# Patient Record
Sex: MALE | Race: WHITE | NOT HISPANIC OR LATINO | Employment: OTHER | URBAN - NONMETROPOLITAN AREA
[De-identification: names, ages, dates, MRNs, and addresses within clinical notes are randomized per-mention and may not be internally consistent; named-entity substitution may affect disease eponyms.]

---

## 2017-03-26 ENCOUNTER — HISTORY (OUTPATIENT)
Dept: FAMILY MEDICINE | Facility: OTHER | Age: 57
End: 2017-03-26

## 2017-03-26 ENCOUNTER — OFFICE VISIT - GICH (OUTPATIENT)
Dept: FAMILY MEDICINE | Facility: OTHER | Age: 57
End: 2017-03-26

## 2017-03-26 DIAGNOSIS — R68.89 OTHER GENERAL SYMPTOMS AND SIGNS: ICD-10-CM

## 2017-03-26 DIAGNOSIS — R05.9 COUGH: ICD-10-CM

## 2017-03-31 ENCOUNTER — HISTORY (OUTPATIENT)
Dept: FAMILY MEDICINE | Facility: OTHER | Age: 57
End: 2017-03-31

## 2017-03-31 ENCOUNTER — OFFICE VISIT - GICH (OUTPATIENT)
Dept: FAMILY MEDICINE | Facility: OTHER | Age: 57
End: 2017-03-31

## 2017-03-31 DIAGNOSIS — J06.9 ACUTE UPPER RESPIRATORY INFECTION: ICD-10-CM

## 2017-03-31 DIAGNOSIS — J01.90 ACUTE SINUSITIS: ICD-10-CM

## 2018-01-04 NOTE — PROGRESS NOTES
Patient Information     Patient Name MRN Sex Uriel Newman [id number] Male 1960      Progress Notes by Rosalba Rascon at 3/26/2017  6:27 PM     Author:  Rosalba Rascon Service:  (none) Author Type:  PHYS- Nurse Practitioner     Filed:  3/26/2017  6:27 PM Encounter Date:  3/26/2017 Status:  Signed     :  Rosalba Rascon (PHYS- Nurse Practitioner)            Called and spoke with patient as to results, implications and any follow up required.    ROSALBA RASCON ....................  3/26/2017   6:27 PM

## 2018-01-04 NOTE — PROGRESS NOTES
Patient Information     Patient Name MRN Sex     Uriel Monsalve [id number] Male 1960      Progress Notes by Rosalba Thorne at 3/26/2017  3:30 PM     Author:  Rosalba Thorne Service:  (none) Author Type:  PHYS- Nurse Practitioner     Filed:  3/31/2017  7:20 PM Encounter Date:  3/26/2017 Status:  Signed     :  Rosalba Thorne (PHYS- Nurse Practitioner)            HPI:    Uriel Monsalve is a 56 y.o. male who presents to clinic today for a cough, body aches, fever for the past 3 days. He has felt weak and his ears are plugged. His temperature was 100.7 2 hours ago but he had taken Ibuprofen. Has a productive cough of yellow phlegm. Denies feeling short of breath. Sleeping poorly due to cough, aches and pains. Very fatigued. Taking lots of fluids, appetite is decreased. Has been taking ibuprofen 3 tabs every 4 hours or so, also Theraflu and other cold medicines.       No past medical history on file.  Past Surgical History       Procedure   Laterality Date     Removal of nail from left hand        [place] teeth extraction        Sinus surgery        Colonoscopy diagnostic   16     F/U         Social History      Substance Use Topics        Smoking status:  Former Smoker     Packs/day: 0.10     Quit date: 2017     Smokeless tobacco:  Never Used     Alcohol use  No     Current Outpatient Prescriptions       Medication  Sig Dispense Refill     multivitamin (MVI) tablet Take 1 tablet by mouth once daily.  0     [place] Oil-Omega-3 Fatty Acids (FISH OIL) 500-100 mg cap capsule Take 1 capsule by mouth once daily.       vitamin e 200 unit capsule Take 1 capsule by mouth once daily.       No current facility-administered medications for this visit.      Medications have been reviewed by me and are current to the best of my knowledge and ability.    No Known Allergies    ROS:  Refer to HPI, otherwise negative.    EXAM:  General appearance: ill appearing gentleman, in no acute distress  Head:  normocephalic, atraumatic, some facial tenderness  Ears: TM's with mild erythema, effusion bilaterally, right more erythremic, canals clear bilaterally  Eyes: conjunctivae normal  Orophayrnx: moist mucous membranes, tonsils with mild erythema, no exudates or petechiae, no post nasal drip seen  Neck: supple without adenopathy  Respiratory: clear to auscultation bilaterally, slightly diminished in lower lobes.   Cardiac: RRR with no murmurs  Dermatological: no rashes or lesions  Psychological: normal affect, alert and pleasant  Results for orders placed or performed in visit on 03/26/17      [facility] ONLY INFLUENZA A/B PCR      Result  Value Ref Range    INFLUENZA  A  PCR Negative      INFLUENZA B PCR Negative           ASSESSMENT/PLAN:    ICD-10-CM    1. Influenza-like symptoms R68.89 [facility] ONLY INFLUENZA A/B PCR      [facility] ONLY INFLUENZA A/B PCR      oseltamivir (TAMIFLU) 75 mg capsule   2. Cough R05 albuterol (PROAIR RESPICLICK) 90 mcg/actuation INHALER      codeine-guaiFENesin (CHERATUSSIN AC)  mg/5 mL liquid      DISCONTINUED: benzonatate (TESSALON PERLES) 100 mg capsule       Plan: Influenza like illness, pt agrees to Influenza PCR testing and will call with results and Rx Tamiflu is positive.   Discussed Rx for tessalon, has used in the past and said did nothing to help. Declines.  Rx for Cheratussin with codeine, albuterol 2 puffs up to 4 times a day.  Reviewed additional self care measures and instructions for following up if not improved.   See patient instructions. Pt in agreements with plan.  Discussed expected course, Signs and symptoms to return to PCP.   No further questions.       Patient Instructions   I do think you have influenza. We are waiting on the tests. I could call you with the results and if it is positive I will order Tamilfu    Robitussin with Codiene take 10 ml every 6 hours for cough.    I also ordered albuterol 2 puffs up to 4 times per day.     Take some mucinex to help loosen  the congestion.     -Please take tylenol (acetominophen) 2 tabs as needed up to 4 times daily.    -Drink lots of fluids and get plenty of rest.   -Treat symptomatically with warm salt water gargles.    -You can use throat lozenges, frequent swallows of cool/cold liquids to sooth your throat. -You may also take Advil (ibuprofen) 3 tabs up to 3 times per day or Aleve (naproxen) 2 tabs twice a day as needed for fever or pain. You may want to take this regularly for a few days if you are experiencing body aches or inflamation.   -ALWAYs take ibuprofen, naproxen with food and plenty of fluids. Stop if you develop heartburn or stomach pain.   -Oatmeal coats the throat and some patients find it soothes the pain.     -Monitor for any fevers or chills.  Return in 7-10 days if not feeling better.   -Please call clinic with any questions or concerns.   -Return to clinic with change/worsening of symptoms.

## 2018-01-04 NOTE — NURSING NOTE
Patient Information     Patient Name MRN Sex Uriel Newman [id number] Male 1960      Nursing Note by Etta Venegas at 3/26/2017  3:30 PM     Author:  Etta Venegas Service:  (none) Author Type:  (none)     Filed:  3/26/2017  4:06 PM Encounter Date:  3/26/2017 Status:  Signed     :  Etta Venegas            He has had a cough, body aches , fever for the past 3 days. He has felt weak and his ears are plugged. His temperature was 100.7 2 hours ago but he had taken Ibuprofen.      Etta Venegas LPN..................3/26/2017   4:05 PM

## 2018-01-04 NOTE — PATIENT INSTRUCTIONS
Patient Information     Patient Name MRN Sex Uriel Newman [id number] Male 1960      Patient Instructions by Rosalba Thorne at 3/26/2017  3:30 PM     Author:  Rosalba Thorne  Service:  (none) Author Type:  PHYS- Nurse Practitioner     Filed:  3/26/2017  4:48 PM  Encounter Date:  3/26/2017 Status:  Addendum     :  Rosalba Thorne (PHYS- Nurse Practitioner)        Related Notes: Original Note by Rosalba Thorne (PHYS- Nurse Practitioner) filed at 3/26/2017  4:41 PM            I do think you have influenza. We are waiting on the tests. I could call you with the results and if it is positive I will order Tamilfu    Robitussin with Codiene take 10 ml every 6 hours for cough.    I also ordered albuterol 2 puffs up to 4 times per day.     Take some mucinex to help loosen the congestion.     -Please take tylenol (acetominophen) 2 tabs as needed up to 4 times daily.    -Drink lots of fluids and get plenty of rest.   -Treat symptomatically with warm salt water gargles.    -You can use throat lozenges, frequent swallows of cool/cold liquids to sooth your throat. -You may also take Advil (ibuprofen) 3 tabs up to 3 times per day or Aleve (naproxen) 2 tabs twice a day as needed for fever or pain. You may want to take this regularly for a few days if you are experiencing body aches or inflamation.   -ALWAYs take ibuprofen, naproxen with food and plenty of fluids. Stop if you develop heartburn or stomach pain.   -Oatmeal coats the throat and some patients find it soothes the pain.     -Monitor for any fevers or chills.  Return in 7-10 days if not feeling better.   -Please call clinic with any questions or concerns.   -Return to clinic with change/worsening of symptoms.

## 2018-01-04 NOTE — NURSING NOTE
Patient Information     Patient Name MRN Sex Uriel Newman [id number] Male 1960      Nursing Note by Gosselin, Norma J at 3/31/2017 12:00 PM     Author:  Gosselin, Norma J Service:  (none) Author Type:  (none)     Filed:  3/31/2017 12:11 PM Encounter Date:  3/31/2017 Status:  Signed     :  Gosselin, Norma J            Patient presents with cough, sinus drainage and congestion, sinus pressure, poor appetite.  Norma J Gosselin LPN....................  3/31/2017   12:08 PM

## 2018-01-04 NOTE — PROGRESS NOTES
"Patient Information     Patient Name MRN Sex     Uriel Monsalve [id number] Male 1960      Progress Notes by Santa Malik NP at 3/31/2017 12:00 PM     Author:  Santa Malik NP Service:  (none) Author Type:  PHYS- Nurse Practitioner     Filed:  2017 10:35 AM Encounter Date:  3/31/2017 Status:  Signed     :  Santa Malik NP (PHYS- Nurse Practitioner)            HPI:    Uriel Monsalve is a 56 y.o. male who presents to clinic today for sinus concerns. He was seen 3/26/2017 for influenza like illness. Has had sx \"weeks\" and had gotten worse last weekend. this is clarified as sx present for 2 weeks.  Having sinus congestion, pressure. Cough and poor appetite present as well. Having SOB with cough. Cough is productive. Having LGT that are intermittent. Taking tylenol, ibuprofen for sx. Was given tessalon, cough medication and inhaler. No asthma or COPD hx.     No past medical history on file.  Past Surgical History:      Procedure  Laterality Date     COLONOSCOPY DIAGNOSTIC  16    F/U         Removal of nail from left hand       SINUS SURGERY       WISDOM TEETH EXTRACTION       Social History      Substance Use Topics        Smoking status:  Former Smoker     Packs/day: 0.10     Quit date: 2017     Smokeless tobacco:  Never Used     Alcohol use  No     Current Outpatient Prescriptions       Medication  Sig Dispense Refill     albuterol (PROAIR RESPICLICK) 90 mcg/actuation INHALER Inhale 2 Puffs by mouth every 4 hours. 1 Inhaler 0     codeine-guaiFENesin (CHERATUSSIN AC)  mg/5 mL liquid Take 10 mL by mouth every 6 hours if needed for Cough for up to 5 days. Max dose 60 mL per 24 hrs. 200 mL 0     multivitamin (MVI) tablet Take 1 tablet by mouth once daily.  0     [place] Oil-Omega-3 Fatty Acids (FISH OIL) 500-100 mg cap capsule Take 1 capsule by mouth once daily.       vitamin e 200 unit capsule Take 1 capsule by mouth once daily.       No current facility-administered medications for " this visit.      Medications have been reviewed by me and are current to the best of my knowledge and ability.    No Known Allergies    ROS:  Pertinent positives and negatives are noted in HPI.    EXAM:  General appearance: well appearing male, in no acute distress  Head: normocephalic, atraumatic, maxillary sinus tenderness  Ears: TM's with cone of light, no erythema, canals clear bilaterally  Eyes: conjunctivae normal  Orophayrnx: moist mucous membranes, tonsils without erythema, exudates or petechiae, post nasal drip seen  Nose:  Bilateral turbinate swollen and erythematous  Neck: supple without adenopathy  Respiratory: clear to auscultation bilaterally, frequent cough, no respiratory distress  Cardiac: RRR with no murmurs  Psychological: normal affect, alert and pleasant    ASSESSMENT/PLAN:    ICD-10-CM    1. Acute URI J06.9 azithromycin (ZITHROMAX) 250 mg tablet      predniSONE (DELTASONE) 20 mg tablet   2. Acute sinusitis, recurrence not specified, unspecified location J01.90 azithromycin (ZITHROMAX) 250 mg tablet      predniSONE (DELTASONE) 20 mg tablet        Very argumentative from beginning of visit. Was not wanting to talk about why was here, reports he should have had abx last week when seen. Reports needs prednisone and if not given, does not want anything as he has hx of chronic hives and needs this. Attempted to talk and explain why no abx last week and why do not feel prednisone is appropriate. He was quite rude to me despite all I tried to do. Abx and prednisone ordered. I do feel that he is appropriate for abx given sx.

## 2018-01-26 VITALS
TEMPERATURE: 99.1 F | SYSTOLIC BLOOD PRESSURE: 134 MMHG | WEIGHT: 186.8 LBS | DIASTOLIC BLOOD PRESSURE: 88 MMHG | HEART RATE: 92 BPM | OXYGEN SATURATION: 91 %

## 2018-01-26 VITALS
TEMPERATURE: 99.2 F | DIASTOLIC BLOOD PRESSURE: 76 MMHG | OXYGEN SATURATION: 93 % | HEART RATE: 64 BPM | WEIGHT: 187 LBS | HEIGHT: 70 IN | BODY MASS INDEX: 26.77 KG/M2 | SYSTOLIC BLOOD PRESSURE: 124 MMHG

## 2018-02-01 ENCOUNTER — HISTORY (OUTPATIENT)
Dept: FAMILY MEDICINE | Facility: OTHER | Age: 58
End: 2018-02-01

## 2018-02-01 ENCOUNTER — OFFICE VISIT - GICH (OUTPATIENT)
Dept: FAMILY MEDICINE | Facility: OTHER | Age: 58
End: 2018-02-01

## 2018-02-01 ENCOUNTER — DOCUMENTATION ONLY (OUTPATIENT)
Dept: FAMILY MEDICINE | Facility: OTHER | Age: 58
End: 2018-02-01

## 2018-02-01 DIAGNOSIS — J20.8 ACUTE BRONCHITIS DUE TO OTHER SPECIFIED ORGANISMS: ICD-10-CM

## 2018-02-01 PROBLEM — F52.8 PSYCHOSEXUAL DYSFUNCTION WITH INHIBITED SEXUAL EXCITEMENT: Status: ACTIVE | Noted: 2018-02-01

## 2018-02-01 RX ORDER — DIPHENOXYLATE HYDROCHLORIDE AND ATROPINE SULFATE 2.5; .025 MG/1; MG/1
1 TABLET ORAL DAILY
COMMUNITY
Start: 2016-09-24 | End: 2024-01-16

## 2018-02-01 RX ORDER — AZITHROMYCIN 250 MG/1
TABLET, FILM COATED ORAL
COMMUNITY
Start: 2017-03-31 | End: 2018-08-09

## 2018-02-01 RX ORDER — OMEGA-3/DHA/EPA/FISH OIL 60 MG-90MG
1 CAPSULE ORAL DAILY
COMMUNITY
End: 2021-03-01

## 2018-02-09 VITALS
OXYGEN SATURATION: 94 % | RESPIRATION RATE: 20 BRPM | BODY MASS INDEX: 27.8 KG/M2 | TEMPERATURE: 98.4 F | WEIGHT: 194.2 LBS | DIASTOLIC BLOOD PRESSURE: 76 MMHG | SYSTOLIC BLOOD PRESSURE: 126 MMHG | HEART RATE: 80 BPM | HEIGHT: 70 IN

## 2018-02-13 NOTE — PROGRESS NOTES
Patient Information     Patient Name MRN Sex     Uriel Monsalve [id number] Male 1960      Progress Notes by Annalee Palma NP at 2018 12:15 PM     Author:  Annalee Palma NP Service:  (none) Author Type:  PHYS- Nurse Practitioner     Filed:  2018  1:06 PM Encounter Date:  2018 Status:  Signed     :  Annalee Palma NP (PHYS- Nurse Practitioner)            Nursing Notes:   Mary García  2018 12:47 PM  Signed  Patient presents to the clinic for cough. States he has had it for about a total of about a week, but recently has gotten worse. States his coughing fits are so bad he feels SOB. Has been afebrile.   Mary García LPN............................ 2018 12:34 PM    SUBJECTIVE:    Uriel Monsalve is a 57 y.o. male who presents for Cough for a week.     URI    This is a new problem. The current episode started in the past 7 days. The problem has been gradually worsening. There has been no fever. Associated symptoms include congestion, coughing, rhinorrhea and a sore throat. Pertinent negatives include no ear pain, nausea, neck pain, plugged ear sensation, rash, sinus pain, sneezing, swollen glands, vomiting or wheezing. Associated symptoms comments: He did not get a flu shot this year. He is eating and drinking well. Increased sleepiness. No hx of asthma, But has COPD. . He has tried acetaminophen (Apple cider vinegar ) for the symptoms. The treatment provided mild relief.       Current Outpatient Prescriptions on File Prior to Visit       Medication  Sig Dispense Refill     albuterol (PROAIR RESPICLICK) 90 mcg/actuation INHALER Inhale 2 Puffs by mouth every 4 hours. 1 Inhaler 0     multivitamin (MVI) tablet Take 1 tablet by mouth once daily.  0     [place] Oil-Omega-3 Fatty Acids (FISH OIL) 500-100 mg cap capsule Take 1 capsule by mouth once daily.       vitamin e 200 unit capsule Take 1 capsule by mouth once daily.       No current facility-administered  "medications on file prior to visit.        REVIEW OF SYSTEMS:  Review of Systems   HENT: Positive for congestion, rhinorrhea and sore throat. Negative for ear pain, sinus pain and sneezing.    Respiratory: Positive for cough. Negative for wheezing.    Gastrointestinal: Negative for nausea and vomiting.   Musculoskeletal: Negative for neck pain.   Skin: Negative for rash.       OBJECTIVE:  /76 (Cuff Site: Right Arm, Position: Sitting, Cuff Size: Adult Regular)  Pulse 80  Temp 98.4  F (36.9  C) (Tympanic)  Resp 20  Ht 1.778 m (5' 10\")  Wt 88.1 kg (194 lb 3.2 oz)  SpO2 94%  BMI 27.86 kg/m2    EXAM:   Physical Exam   Constitutional: He is well-developed, well-nourished, and in no distress.   HENT:   Head: Normocephalic and atraumatic.   Right Ear: Tympanic membrane and ear canal normal.   Left Ear: Tympanic membrane and ear canal normal.   Nose: Right sinus exhibits no maxillary sinus tenderness and no frontal sinus tenderness. Left sinus exhibits no maxillary sinus tenderness and no frontal sinus tenderness.   Mouth/Throat: Uvula is midline, oropharynx is clear and moist and mucous membranes are normal.   Nasal congestion noted.    Eyes: Conjunctivae are normal.   Neck: Neck supple.   Cardiovascular: Normal rate, regular rhythm and normal heart sounds.    Pulmonary/Chest: Effort normal. No respiratory distress. He has no decreased breath sounds. He has wheezes in the right upper field and the left upper field. He has no rhonchi. He has no rales.   Expiratory wheezing noted   Lymphadenopathy:     He has no cervical adenopathy.   Skin: Skin is warm and dry.   Psychiatric: Mood and affect normal.   Nursing note and vitals reviewed.      ASSESSMENT/PLAN:    ICD-10-CM    1. Acute viral bronchitis J20.8 albuterol HFA (PROAIR HFA) 90 mcg/actuation inhaler      predniSONE (DELTASONE) 20 mg tablet      codeine-guaiFENesin (ROBITUSSIN AC)  mg/5 mL liquid      Likely Viral bronchitis d/t RSV which is going " around the community.     Plan:  Home cares and OTC Gone over. F/U prn. Symptoms likely due to virus. No antibiotic is needed at this time. Symptoms typically worse on days 2-5 and then stabilize and you are sick for days 5-12. Days 12-14 there is slow resolution and if there is a cough, studies show it can linger longer, however one is not as ill as in the beginning. If symptoms begin worsening or fail to improve after 14 days, return to clinic for reevaluation. All questions were answered and he is in agreement with plan.       MICHAEL PARKS NP ....................  2/1/2018   12:44 PM

## 2018-02-13 NOTE — PATIENT INSTRUCTIONS
Patient Information     Patient Name MRN Uriel Kasper [id number] Male 1960      Patient Instructions by Annalee Palma NP at 2018 12:15 PM     Author:  Annalee Palma NP Service:  (none) Author Type:  PHYS- Nurse Practitioner     Filed:  2018 12:55 PM Encounter Date:  2018 Status:  Signed     :  Annalee Palma NP (PHYS- Nurse Practitioner)            Acute Bronchitis   ________________________________________________________________________  KEY POINTS    Acute bronchitis is swelling and irritation of the airways (bronchial tubes) which connect the windpipe to the lungs. Acute bronchitis may also be called a chest cold.    Acute bronchitis often does not need medical treatment. You may be able to treat your symptoms at home. Talk to your healthcare provider if your symptoms are severe or if you have another medical condition such as heart or lung disease or diabetes.    Drink plenty of liquids and rest at home. Ask your healthcare provider what symptoms or problems you should watch for and what to do if you have them.  ________________________________________________________________________  What is acute bronchitis?  Acute bronchitis is swelling and irritation of the airways (bronchial tubes) which connect the windpipe to the lungs. Acute bronchitis usually goes away within a few weeks with treatment. Acute bronchitis may also be called a chest cold.  A different form of bronchitis, called chronic bronchitis, is a long-term condition that causes breathing problems. Chronic bronchitis is one type of chronic obstructive pulmonary disease (COPD) and is usually caused by smoking.  What is the cause?  Acute bronchitis is usually caused by viral infections that affect the lungs. Less often, it may be a bacterial infection.  Acute bronchitis is most common during the winter or when the level of air pollution is high.  People who have a higher risk for bronchitis  include:    Infants, young children, and older adults    Smokers    People with heart disease    People with allergies, asthma, or other lung diseases  What are the symptoms?  Symptoms may include:    A deep cough with yellowish or greenish mucus    Pain in your chest when you breathe deeply or cough    Wheezing or feeling short of breath    Fever and chills    Headache    Body aches  How is it diagnosed?  Your healthcare provider will ask about your symptoms and medical history and examine you. You may have tests, such as:    Chest X-ray    Blood tests  How is it treated?  Acute bronchitis often does not need medical treatment. You may be able to treat your symptoms at home:    Get plenty of rest.    Drink plenty of clear liquids unless your healthcare provider has told you to limit liquids. Water, broth, juice, electrolyte solutions, and non-caffeinated drinks are best. If you have a fever, your body needs more liquid because you can get dehydrated.    Talk to your healthcare provider if your symptoms are severe or if you have heart disease, asthma, chronic bronchitis, kidney disease, diabetes, or another chronic medical problem. You may need to take antibiotic medicines. If you are wheezing, you may need an inhaler medicine to make it easier to breathe.  Most of the time acute bronchitis clears up in several days. Your cough may slowly get better in 1 to 4 weeks. It may take you longer to recover if:    You are a smoker.    You live in an area where air pollution is a problem.    You have a heart or lung disease, including asthma.    You have other health problems.  How can I take care of myself?  Follow the full course of treatment prescribed by your healthcare provider. In addition:    Use a humidifier to put more moisture in the air. Avoid steam vaporizers because they can cause burns. Be sure to keep the humidifier clean, as recommended in the 's instructions. It's important to keep bacteria and  mold from growing in the water container.    Drink plenty of liquids. Ask your healthcare provider about how much liquid you should drink each day.    Take cough medicine if recommended by your healthcare provider. Cover your cough.    Don t smoke, and stay away from others who are smoking.    Avoid breathing dust and chemical fumes.    Get extra rest.    Take nonprescription medicine, such as acetaminophen, ibuprofen, or naproxen to treat pain and fever. Read the label and take as directed. Unless recommended by your healthcare provider, you should not take these medicines for more than 10 days.    Nonsteroidal anti-inflammatory medicines (NSAIDs), such as ibuprofen, naproxen, and aspirin, may cause stomach bleeding and other problems. These risks increase with age.    Acetaminophen may cause liver damage or other problems. Unless recommended by your provider, don't take more than 3000 milligrams (mg) in 24 hours. To make sure you don t take too much, check other medicines you take to see if they also contain acetaminophen. Ask your provider if you need to avoid drinking alcohol while taking this medicine.  Ask your provider:    How and when you will get your test results    How long it will take to recover    If there are activities you should avoid and when you can return to your normal activities    How to take care of yourself at home    What symptoms or problems you should watch for and what to do if you have them  Make sure you know when you should come back for a checkup. Keep all appointments for provider visits or tests.  How can I help prevent acute bronchitis?  To reduce your risk of getting a lung infection:    Wash your hands often and especially after using the restroom, coughing, sneezing, or blowing your nose. Also wash your hands before eating or touching your eyes.    Stay at least 6 feet away from people who are sick, if you can.    Stay indoors as much as possible on high-pollution days.    Take  care of your health. Try to get at least 7 to 9 hours of sleep each night. Eat a healthy diet and try to keep a healthy weight. If you smoke, try to quit. If you want to drink alcohol, ask your healthcare provider how much is safe for you to drink. Learn ways to manage stress. Stay physically active as advised by your provider.

## 2018-02-13 NOTE — NURSING NOTE
Patient Information     Patient Name MRN Sex Uriel Newman [id number] Male 1960      Nursing Note by Mary García at 2018 12:15 PM     Author:  Mary García Service:  (none) Author Type:  NURS- Student Practical Nurse     Filed:  2018 12:47 PM Encounter Date:  2018 Status:  Signed     :  Mary García (NURS- Student Practical Nurse)            Patient presents to the clinic for cough. States he has had it for about a total of about a week, but recently has gotten worse. States his coughing fits are so bad he feels SOB. Has been afebrile.   Mary García LPN............................ 2018 12:34 PM

## 2018-03-03 ENCOUNTER — OFFICE VISIT (OUTPATIENT)
Dept: FAMILY MEDICINE | Facility: OTHER | Age: 58
End: 2018-03-03
Attending: FAMILY MEDICINE
Payer: COMMERCIAL

## 2018-03-03 VITALS
HEIGHT: 70 IN | RESPIRATION RATE: 20 BRPM | SYSTOLIC BLOOD PRESSURE: 130 MMHG | HEART RATE: 94 BPM | OXYGEN SATURATION: 90 % | TEMPERATURE: 96.9 F | WEIGHT: 189.3 LBS | BODY MASS INDEX: 27.1 KG/M2 | DIASTOLIC BLOOD PRESSURE: 84 MMHG

## 2018-03-03 DIAGNOSIS — J40 BRONCHITIS: Primary | ICD-10-CM

## 2018-03-03 PROCEDURE — 99213 OFFICE O/P EST LOW 20 MIN: CPT | Performed by: FAMILY MEDICINE

## 2018-03-03 RX ORDER — AZITHROMYCIN 250 MG/1
TABLET, FILM COATED ORAL
Qty: 6 TABLET | Refills: 0 | Status: SHIPPED | OUTPATIENT
Start: 2018-03-03 | End: 2018-08-09

## 2018-03-03 ASSESSMENT — ENCOUNTER SYMPTOMS
FEVER: 1
COUGH: 1
FATIGUE: 1
CHILLS: 0

## 2018-03-03 NOTE — PROGRESS NOTES
"  SUBJECTIVE:   Uriel Monsalve is a 57 year old male who presents to clinic today for the following health issues: cough     HPI Comments: Treated one month ago with prednisone inhaler and cough medication without any relief     Cough   Associated symptoms include ear pain. Pertinent negatives include no chills.   Sinus Problem    Associated symptoms include ear pain and cough. Pertinent negatives include no chills.   Ear Problem   Associated symptoms include coughing, fatigue and a fever. Pertinent negatives include no chills.   URI   This is a new problem. The current episode started more than 1 month ago. The problem occurs constantly. The problem has been gradually worsening. Associated symptoms include coughing, fatigue and a fever. Pertinent negatives include no chills. Associated symptoms comments: Nasal congestion. He has tried acetaminophen, NSAIDs, drinking and rest for the symptoms. The treatment provided significant relief.         Patient Active Problem List    Diagnosis Date Noted     Bronchitis 03/03/2018     Priority: Medium     Psychosexual dysfunction with inhibited sexual excitement 02/01/2018     Priority: Medium     Diverticulosis of sigmoid colon 01/25/2016     Priority: Medium     H/O adenomatous polyp of colon 01/25/2016     Priority: Medium     Health care maintenance 01/21/2016     Priority: Medium     Hypercholesterolemia 01/12/2016     Priority: Medium     Hives 01/11/2016     Priority: Medium     No past medical history on file.     Review of Systems   Constitutional: Positive for fatigue and fever. Negative for chills.   HENT: Positive for ear pain.    Respiratory: Positive for cough.         OBJECTIVE:     /84 (BP Location: Left arm, Patient Position: Sitting, Cuff Size: Adult Regular)  Pulse 94  Temp 96.9  F (36.1  C) (Tympanic)  Resp 20  Ht 5' 10\" (1.778 m)  Wt 189 lb 4.8 oz (85.9 kg)  SpO2 90%  BMI 27.16 kg/m2  Body mass index is 27.16 kg/(m^2).  Physical Exam "   Constitutional:   sick   HENT:   Right Ear: External ear normal.   Left Ear: External ear normal.   Cardiovascular: Normal rate and regular rhythm.    Pulmonary/Chest: Effort normal. He has wheezes. He has rales.       none     ASSESSMENT/PLAN:         1. Bronchitis  Fu in one week at the clinic if not improving   - azithromycin (ZITHROMAX) 250 MG tablet; Two tablets first day, then one tablet daily for four days.  Dispense: 6 tablet; Refill: 0  - Albuterol Sulfate 108 (90 BASE) MCG/ACT AEPB; Inhale 2 puffs into the lungs every 4 hours  Dispense: 1 each; Refill: 3      Fernando Macedo MD  [facility]

## 2018-03-03 NOTE — NURSING NOTE
Patient presents to clinic with complaint of ongoing productive cough , sinus pressure, and ear pain. Patient was seen in clinic 2/1/18 with similar symptoms and he says that his symptoms have not improved since then. At that time, he was prescribed prednisone and albuterol inhaler. He states that the inhaler has helped a lot.  Eliezer Cristobal LPN...... 10:37 AM 3/3/2018

## 2018-03-03 NOTE — MR AVS SNAPSHOT
"              After Visit Summary   3/3/2018    Uriel Monsalve    MRN: [id number]           Patient Information     Date Of Birth          1960        Visit Information        Provider Department      3/3/2018 10:15 AM Fernando Macedo MD [facility]        Today's Diagnoses     Bronchitis    -  1       Follow-ups after your visit        Who to contact     If you have questions or need follow up information about today's clinic visit or your schedule please contact [facility] directly at [phone].  Normal or non-critical lab and imaging results will be communicated to you by Apollidonhart, letter or phone within 4 business days after the clinic has received the results. If you do not hear from us within 7 days, please contact the clinic through Apollidonhart or phone. If you have a critical or abnormal lab result, we will notify you by phone as soon as possible.  Submit refill requests through [employer] or call your pharmacy and they will forward the refill request to us. Please allow 3 business days for your refill to be completed.          Additional Information About Your Visit        MyChart Information     [employer] lets you send messages to your doctor, view your test results, renew your prescriptions, schedule appointments and more. To sign up, go to [email or web address] . Click on \"Log in\" on the left side of the screen, which will take you to the Welcome page. Then click on \"Sign up Now\" on the right side of the page.     You will be asked to enter the access code listed below, as well as some personal information. Please follow the directions to create your username and password.     Your access code is: [email or web address]  Expires: 2018 10:57 AM     Your access code will  in 90 days. If you need help or a new code, please call your [facility] or [phone].        Care EveryWhere ID     This is your Care EveryWhere ID. This could be used by other " "organizations to access your [place] medical records  [id number]        Your Vitals Were     Pulse Temperature Respirations Height Pulse Oximetry BMI (Body Mass Index)    94 96.9  F (36.1  C) (Tympanic) 20 5' 10\" (1.778 m) 90% 27.16 kg/m2       Blood Pressure from Last 3 Encounters:   03/03/18 130/84   02/01/18 126/76   03/31/17 124/76    Weight from Last 3 Encounters:   03/03/18 189 lb 4.8 oz (85.9 kg)   02/01/18 194 lb 3.2 oz (88.1 kg)   03/31/17 187 lb (84.8 kg)              Today, you had the following     No orders found for display         Today's Medication Changes          These changes are accurate as of 3/3/18 10:57 AM.  If you have any questions, ask your nurse or doctor.               These medicines have changed or have updated prescriptions.        Dose/Directions    * azithromycin 250 MG tablet   Commonly known as:  ZITHROMAX   This may have changed:  Another medication with the same name was added. Make sure you understand how and when to take each.   Changed by:  Fernando Macedo MD        Take 500 mg (2 tabs) by mouth on day 1, then 250 mg (1 tab) daily for days 2-5.   Refills:  0       * azithromycin 250 MG tablet   Commonly known as:  ZITHROMAX   This may have changed:  You were already taking a medication with the same name, and this prescription was added. Make sure you understand how and when to take each.   Used for:  Bronchitis   Changed by:  Fernando Macedo MD        Two tablets first day, then one tablet daily for four days.   Quantity:  6 tablet   Refills:  0       * Notice:  This list has 2 medication(s) that are the same as other medications prescribed for you. Read the directions carefully, and ask your doctor or other care provider to review them with you.         Where to get your medicines      These medications were sent to [facility] [address] [place] [address]     Phone:  [phone]     Albuterol " Sulfate 108 (90 BASE) MCG/ACT Aepb    azithromycin 250 MG tablet                Primary Care Provider Fax #    Physician No Ref-Primary [phone]       No address on file        Equal Access to Services     JOHN HAHN : Gilberto rafiq otoole perla Ware, mary yoanaluis, emily fuentes, gloria holtroge brandon. So [facility] [phone].    ATENCIÓN: Si habla español, tiene a munguia disposición servicios gratuitos de asistencia lingüística. Llame al [phone].    We comply with applicable federal civil rights laws and [place] laws. We do not discriminate on the basis of race, color, national origin, age, disability, sex, sexual orientation, or gender identity.            Thank you!     Thank you for choosing [facility] [place] [facility] AND [facility]  for your care. Our goal is always to provide you with excellent care. Hearing back from our patients is one way we can continue to improve our services. Please take a few minutes to complete the written survey that you may receive in the mail after your visit with us. Thank you!             Your Updated Medication List - Protect others around you: Learn how to safely use, store and throw away your medicines at www.disposemymeds.org.          This list is accurate as of 3/3/18 10:57 AM.  Always use your most recent med list.                   Brand Name Dispense Instructions for use Diagnosis    Albuterol Sulfate 108 (90 BASE) MCG/ACT Aepb     1 each    Inhale 2 puffs into the lungs every 4 hours    Bronchitis       * azithromycin 250 MG tablet    ZITHROMAX     Take 500 mg (2 tabs) by mouth on day 1, then 250 mg (1 tab) daily for days 2-5.        * azithromycin 250 MG tablet    ZITHROMAX    6 tablet    Two tablets first day, then one tablet daily for four days.    Bronchitis       Fish Oil 500 MG Caps      Take 1 capsule by mouth daily        MULTI-VITAMINS Tabs      Take 1 tablet by mouth daily        vitamin E 200 UNIT capsule      Take 1 capsule by  mouth daily        * Notice:  This list has 2 medication(s) that are the same as other medications prescribed for you. Read the directions carefully, and ask your doctor or other care provider to review them with you.

## 2018-08-09 ENCOUNTER — OFFICE VISIT (OUTPATIENT)
Dept: SURGERY | Facility: OTHER | Age: 58
End: 2018-08-09
Attending: SURGERY
Payer: COMMERCIAL

## 2018-08-09 VITALS — DIASTOLIC BLOOD PRESSURE: 66 MMHG | SYSTOLIC BLOOD PRESSURE: 114 MMHG | WEIGHT: 189.6 LBS | BODY MASS INDEX: 27.2 KG/M2

## 2018-08-09 DIAGNOSIS — L72.3 INFECTED SEBACEOUS CYST OF SKIN: Primary | ICD-10-CM

## 2018-08-09 DIAGNOSIS — L08.9 INFECTED SEBACEOUS CYST OF SKIN: Primary | ICD-10-CM

## 2018-08-09 PROCEDURE — 10060 I&D ABSCESS SIMPLE/SINGLE: CPT | Performed by: SURGERY

## 2018-08-09 RX ORDER — SACCHAROMYCES BOULARDII 250 MG
250 CAPSULE ORAL 2 TIMES DAILY
COMMUNITY
End: 2021-09-08

## 2018-08-09 RX ORDER — CALCIUM POLYCARBOPHIL 625 MG 625 MG/1
2 TABLET ORAL DAILY
COMMUNITY
End: 2023-06-29

## 2018-08-09 ASSESSMENT — PAIN SCALES - GENERAL: PAINLEVEL: NO PAIN (0)

## 2018-08-09 NOTE — PROGRESS NOTES
Patient presents with large, tender lump on his back. Was sore for a couple of weeks and then started having some drainage. No fever. Hasn't had cysts like this before.   /66 (BP Location: Right arm, Patient Position: Sitting, Cuff Size: Adult Large)  Wt 189 lb 9.6 oz (86 kg)  BMI 27.2 kg/m2  General: no acute distress  Skin mid back with 3 x 2.5 cm area of induration and erythema. Some skin breakdown with purulent drainage noted.  A: infected sebaceous cyst  Plan:   Discussed risks and benefits of incision and drainage infection/fluid. Specifically, I explained the risks of continued infection, bleeding, bruising and recurrence. The patient expressed understanding and wishes to proceed. Informed consent paperwork was completed.     Procedure:  The patient was placed in a prone position. The mid back was prepped with betadine and draped. Local anesthetic, lidocaine with epinephrine, was infiltrated in the skin and subcutaneous tissue in the area of the planned incision. The area of fluctuance was palpated. Skin incision was made in a cruciate fashion. There was drainage of purulent fluid. The abscess cavity was explored and no undrained fluid was noted. Sterile gauze was placed in the cavity. Sterile dressing was applied. The patient tolerated the procedure with no immediately apparent complications.    Patient will follow up in 4-6 week for recheck and possible excision. Patient will call withconcerns or questions prior to follow up.

## 2018-08-09 NOTE — MR AVS SNAPSHOT
After Visit Summary   8/9/2018    Uriel Monsalve    MRN: [id number]           Patient Information     Date Of Birth          1960        Visit Information        Provider Department      8/9/2018 1:00 PM Lissy Story MD [facility]        Care Instructions    Remove bandage tomorrow.  You may shower in the morning.  Follow up in 6 weeks.            Follow-ups after your visit        Who to contact     If you have questions or need follow up information about today's clinic visit or your schedule please contact [facility] directly at [phone].  Normal or non-critical lab and imaging results will be communicated to you by MyChart, letter or phone within 4 business days after the clinic has received the results. If you do not hear from us within 7 days, please contact the clinic through Anametrixhart or phone. If you have a critical or abnormal lab result, we will notify you by phone as soon as possible.  Submit refill requests through [employer] or call your pharmacy and they will forward the refill request to us. Please allow 3 business days for your refill to be completed.          Additional Information About Your Visit        Care EveryWhere ID     This is your Care EveryWhere ID. This could be used by other organizations to access your [place] medical records  [id number]        Your Vitals Were     BMI (Body Mass Index)                   27.2 kg/m2            Blood Pressure from Last 3 Encounters:   08/09/18 114/66   03/03/18 130/84   02/01/18 126/76    Weight from Last 3 Encounters:   08/09/18 189 lb 9.6 oz (86 kg)   03/03/18 189 lb 4.8 oz (85.9 kg)   02/01/18 194 lb 3.2 oz (88.1 kg)              Today, you had the following     No orders found for display         Today's Medication Changes          These changes are accurate as of 8/9/18  1:22 PM.  If you have any questions, ask your nurse or doctor.               Stop taking these medicines if you  haven't already. Please contact your care team if you have questions.     azithromycin 250 MG tablet   Commonly known as:  ZITHROMAX   Stopped by:  Lissy Story MD                    Primary Care Provider Fax #    Physician No Ref-Primary [phone]       No address on file        Equal Access to Services     JOHN GIRALDOKRISTIE : Hadcassidy rafiq otoole evelino Sotarshaali, waaxda luqadaha, qaybta kaalmada adesherwinyada, gloria freddiein hayaaroge moonsherwin dick latommyroge . So [facility] [phone].    ATENCIÓN: Si habla español, tiene a munguia disposición servicios gratuitos de asistencia lingüística. Llame al [phone].    We comply with applicable federal civil rights laws and [place] laws. We do not discriminate on the basis of race, color, national origin, age, disability, sex, sexual orientation, or gender identity.            Thank you!     Thank you for choosing [facility] AND [facility]  for your care. Our goal is always to provide you with excellent care. Hearing back from our patients is one way we can continue to improve our services. Please take a few minutes to complete the written survey that you may receive in the mail after your visit with us. Thank you!             Your Updated Medication List - Protect others around you: Learn how to safely use, store and throw away your medicines at www.disposemymeds.org.          This list is accurate as of 8/9/18  1:22 PM.  Always use your most recent med list.                   Brand Name Dispense Instructions for use Diagnosis    albuterol 108 (90 Base) MCG/ACT Aepb inhaler    PROAIR RESPICLICK    1 each    Inhale 2 puffs into the lungs every 4 hours    Bronchitis       calcium polycarbophil 625 MG tablet    FIBERCON     Take 2 tablets by mouth daily        Fish Oil 500 MG Caps      Take 1 capsule by mouth daily        MULTI-VITAMINS Tabs      Take 1 tablet by mouth daily        saccharomyces boulardii 250 MG capsule    FLORASTOR     Take 250 mg by mouth 2 times daily        VITAMIN C PO       Take 500 mg by mouth daily        vitamin E 200 UNIT capsule      Take 1 capsule by mouth daily

## 2018-08-09 NOTE — NURSING NOTE
"Patient presents to clinic with a cyst on his back.    Maura Millan LPN  8/9/2018  1:00 PM    Consent form signed.    TIMEOUT  Universal Protocol    A. Pre-procedure verification complete : Yes  1-relevant information / documentation available, reviewed and properly matched to the patient; 2-consent accurate and complete, 3-equipment and supplies available    B. Site marking complete : NA  Site marked if not in continuous attendance with patient    C. TIME OUT completed : yes  Time Out was conducted just prior to starting procedure to verify the eight required elements: 1-patient identity, 2-consent accurate and complete, 3-position, 4-correct side/site marked (if applicable), 5-procedure, 6-relevant images / results properly labeled and displayed (if applicable), 7-antibiotics / irrigation fluids (if applicable), 8-safety precautions.    Chief Complaint   Patient presents with     Lesion Removal     cyst on back       Initial /66 (BP Location: Right arm, Patient Position: Sitting, Cuff Size: Adult Large)  Wt 189 lb 9.6 oz (86 kg)  BMI 27.2 kg/m2 Estimated body mass index is 27.2 kg/(m^2) as calculated from the following:    Height as of 3/3/18: 5' 10\" (1.778 m).    Weight as of this encounter: 189 lb 9.6 oz (86 kg).  Medication Reconciliation: complete    Maura Millan LPN    "

## 2018-11-06 ENCOUNTER — ALLIED HEALTH/NURSE VISIT (OUTPATIENT)
Dept: FAMILY MEDICINE | Facility: OTHER | Age: 58
End: 2018-11-06
Payer: COMMERCIAL

## 2018-11-06 DIAGNOSIS — Z23 NEED FOR PROPHYLACTIC VACCINATION AND INOCULATION AGAINST INFLUENZA: Primary | ICD-10-CM

## 2018-11-06 PROCEDURE — 90471 IMMUNIZATION ADMIN: CPT | Performed by: FAMILY MEDICINE

## 2018-11-06 PROCEDURE — 90686 IIV4 VACC NO PRSV 0.5 ML IM: CPT | Performed by: FAMILY MEDICINE

## 2018-11-06 NOTE — PROGRESS NOTES

## 2018-11-06 NOTE — MR AVS SNAPSHOT
After Visit Summary   11/6/2018    Uriel Monsalve    MRN: [id number]           Patient Information     Date Of Birth          1960        Visit Information        Provider Department      11/6/2018 1:45 PM  INJECTION NURSE [facility]        Today's Diagnoses     Need for prophylactic vaccination and inoculation against influenza    -  1       Follow-ups after your visit        Who to contact     If you have questions or need follow up information about today's clinic visit or your schedule please contact [facility] directly at [phone].  Normal or non-critical lab and imaging results will be communicated to you by MyChart, letter or phone within 4 business days after the clinic has received the results. If you do not hear from us within 7 days, please contact the clinic through MyChart or phone. If you have a critical or abnormal lab result, we will notify you by phone as soon as possible.  Submit refill requests through [employer] or call your pharmacy and they will forward the refill request to us. Please allow 3 business days for your refill to be completed.          Additional Information About Your Visit        Care EveryWhere ID     This is your Care EveryWhere ID. This could be used by other organizations to access your [place] medical records  [id number]         Blood Pressure from Last 3 Encounters:   08/09/18 114/66   03/03/18 130/84   02/01/18 126/76    Weight from Last 3 Encounters:   08/09/18 189 lb 9.6 oz (86 kg)   03/03/18 189 lb 4.8 oz (85.9 kg)   02/01/18 194 lb 3.2 oz (88.1 kg)              We Performed the Following     HC FLU VAC PRESRV FREE QUAD SPLIT VIR 3+YRS IM     Vaccine Administration, Initial [[id number]]        Primary Care Provider Fax #    Physician No Ref-Primary [phone]       No address on file        Equal Access to Services     JOHN HAHN AH: mary Benavides qaybta kaalmada  gloria fuentessherwin nyajerrod rodríguezaan ah. So [facility] [phone].    ATENCIÓN: Si habla ki, tiene a munguia disposición servicios gratuitos de asistencia lingüística. Sandra al [phone].    We comply with applicable federal civil rights laws and [place] laws. We do not discriminate on the basis of race, color, national origin, age, disability, sex, sexual orientation, or gender identity.            Thank you!     Thank you for choosing [facility] AND [facility]  for your care. Our goal is always to provide you with excellent care. Hearing back from our patients is one way we can continue to improve our services. Please take a few minutes to complete the written survey that you may receive in the mail after your visit with us. Thank you!             Your Updated Medication List - Protect others around you: Learn how to safely use, store and throw away your medicines at www.disposemymeds.org.          This list is accurate as of 11/6/18  2:18 PM.  Always use your most recent med list.                   Brand Name Dispense Instructions for use Diagnosis    albuterol 108 (90 Base) MCG/ACT Aepb inhaler    PROAIR RESPICLICK    1 each    Inhale 2 puffs into the lungs every 4 hours    Bronchitis       calcium polycarbophil 625 MG tablet    FIBERCON     Take 2 tablets by mouth daily        Fish Oil 500 MG Caps      Take 1 capsule by mouth daily        MULTI-VITAMINS Tabs      Take 1 tablet by mouth daily        saccharomyces boulardii 250 MG capsule    FLORASTOR     Take 250 mg by mouth 2 times daily        VITAMIN C PO      Take 500 mg by mouth daily        vitamin E 200 UNIT capsule      Take 1 capsule by mouth daily

## 2018-11-12 ENCOUNTER — OFFICE VISIT (OUTPATIENT)
Dept: FAMILY MEDICINE | Facility: OTHER | Age: 58
End: 2018-11-12
Attending: FAMILY MEDICINE
Payer: COMMERCIAL

## 2018-11-12 ENCOUNTER — HOSPITAL ENCOUNTER (OUTPATIENT)
Dept: GENERAL RADIOLOGY | Facility: OTHER | Age: 58
Discharge: HOME | End: 2018-11-12
Attending: FAMILY MEDICINE | Admitting: FAMILY MEDICINE
Payer: COMMERCIAL

## 2018-11-12 VITALS
HEART RATE: 90 BPM | HEIGHT: 70 IN | BODY MASS INDEX: 28.06 KG/M2 | WEIGHT: 196 LBS | DIASTOLIC BLOOD PRESSURE: 78 MMHG | SYSTOLIC BLOOD PRESSURE: 106 MMHG | TEMPERATURE: 97.5 F | OXYGEN SATURATION: 94 %

## 2018-11-12 DIAGNOSIS — J40 BRONCHITIS: ICD-10-CM

## 2018-11-12 DIAGNOSIS — J44.1 COPD EXACERBATION (H): ICD-10-CM

## 2018-11-12 DIAGNOSIS — J40 BRONCHITIS: Primary | ICD-10-CM

## 2018-11-12 PROCEDURE — 99213 OFFICE O/P EST LOW 20 MIN: CPT | Performed by: FAMILY MEDICINE

## 2018-11-12 PROCEDURE — 71046 X-RAY EXAM CHEST 2 VIEWS: CPT

## 2018-11-12 RX ORDER — AZITHROMYCIN 250 MG/1
TABLET, FILM COATED ORAL
Qty: 6 TABLET | Refills: 0 | Status: SHIPPED | OUTPATIENT
Start: 2018-11-12 | End: 2019-02-07

## 2018-11-12 RX ORDER — PREDNISONE 20 MG/1
20 TABLET ORAL 2 TIMES DAILY
Qty: 10 TABLET | Refills: 0 | Status: SHIPPED | OUTPATIENT
Start: 2018-11-12 | End: 2019-02-07

## 2018-11-12 RX ORDER — ALBUTEROL SULFATE 90 UG/1
2 AEROSOL, METERED RESPIRATORY (INHALATION) EVERY 6 HOURS PRN
Qty: 1 INHALER | Refills: 1 | Status: SHIPPED | OUTPATIENT
Start: 2018-11-12 | End: 2019-02-07

## 2018-11-12 ASSESSMENT — ENCOUNTER SYMPTOMS
APPETITE CHANGE: 0
NAUSEA: 0
PSYCHIATRIC NEGATIVE: 1
GASTROINTESTINAL NEGATIVE: 1

## 2018-11-12 ASSESSMENT — PAIN SCALES - GENERAL: PAINLEVEL: NO PAIN (0)

## 2018-11-12 NOTE — NURSING NOTE
Patient presents to the clinic today for a cough x4 weeks.    Judith Magaña LPN.................. 11/12/2018 8:13 AM

## 2018-11-12 NOTE — PROGRESS NOTES
Nursing Notes:   Judith Magaña LPN  11/12/2018  8:27 AM  Signed  Patient presents to the clinic today for a cough x4 weeks.    Judith Magaña LPN.................. 11/12/2018 8:13 AM       SUBJECTIVE:   CC:  Uriel Monsalve is a 57 year old male who presents to clinic today for the following health issues:  cough    HPI  Uriel Monsalve is a 57 year old male in for evaluation of cough.  Onset a month ago.  Cough is productive at times.  No fever.  Some ear pains with it.  Has wheezing now too.  He is noticing a bit more shortness of breath with exertion.  He is also notes that after activities, such as blowing snow yesterday, he coughs and wheezes more.  He thought this was related to allergies or the furnace.  He has been using an albuterol in this past month - it helps some.  Had similar symptoms last winter.  No chest pain.  No appetite change.    He got a flu shot last week.  Former smoker, having quit 2 years ago.     No Known Allergies  Current Outpatient Prescriptions   Medication     albuterol (PROAIR HFA/PROVENTIL HFA/VENTOLIN HFA) 108 (90 Base) MCG/ACT inhaler     azithromycin (ZITHROMAX) 250 MG tablet     predniSONE (DELTASONE) 20 MG tablet     Albuterol Sulfate 108 (90 BASE) MCG/ACT AEPB     Ascorbic Acid (VITAMIN C PO)     calcium polycarbophil (FIBERCON) 625 MG tablet     Multiple Vitamin (MULTI-VITAMINS) TABS     Omega-3 Fatty Acids (FISH OIL) 500 MG CAPS     saccharomyces boulardii (FLORASTOR) 250 MG capsule     vitamin E 200 UNIT capsule     No current facility-administered medications for this visit.       No past medical history on file.   Past Surgical History:   Procedure Laterality Date     COLONOSCOPY      1/25/16,F/U 2021     ENDOSCOPIC SINUS SURGERY      No Comments Provided     EXTRACTION(S) DENTAL      No Comments Provided     OTHER SURGICAL HISTORY      493999,OTHER     Family History   Problem Relation Age of Onset     Cancer Father      Cancer     Diabetes Other      Diabetes,Family  "history     Cancer Brother      Cancer,neck     Cancer Brother      Cancer,throat       Review of Systems   Constitutional: Negative for appetite change.   Gastrointestinal: Negative.  Negative for nausea.   Psychiatric/Behavioral: Negative.         PHQ-2 Score:     PHQ-2 ( 1999 Pfizer) 11/12/2018 3/3/2018   Q1: Little interest or pleasure in doing things 0 0   Q2: Feeling down, depressed or hopeless 0 0   PHQ-2 Score 0 0         No flowsheet data found.      OBJECTIVE:     /78 (BP Location: Right arm, Patient Position: Sitting, Cuff Size: Adult Regular)  Pulse 90  Temp 97.5  F (36.4  C) (Tympanic)  Ht 5' 10\" (1.778 m)  Wt 196 lb (88.9 kg)  SpO2 94%  BMI 28.12 kg/m2  Body mass index is 28.12 kg/(m^2).  Physical Exam   Constitutional: He appears well-developed and well-nourished.   HENT:   Nose: Nose normal.   Mouth/Throat: No oropharyngeal exudate.   Right tympanic membrane with mild to moderate erythema, left is normal   Cardiovascular: Normal rate and regular rhythm.    Pulmonary/Chest: No respiratory distress. He has wheezes. He has rales. He exhibits no tenderness.   Lymphadenopathy:     He has no cervical adenopathy.   Skin: No rash noted.   Nursing note and vitals reviewed.     Chest xray is obtained today.  This compared to that from 2016.        ASSESSMENT/PLAN:       ICD-10-CM    1. Bronchitis J40 XR Chest 2 Views     albuterol (PROAIR HFA/PROVENTIL HFA/VENTOLIN HFA) 108 (90 Base) MCG/ACT inhaler     predniSONE (DELTASONE) 20 MG tablet     azithromycin (ZITHROMAX) 250 MG tablet   2. COPD exacerbation (H) J44.1 albuterol (PROAIR HFA/PROVENTIL HFA/VENTOLIN HFA) 108 (90 Base) MCG/ACT inhaler     predniSONE (DELTASONE) 20 MG tablet            PLAN:  1.  Discussed with patient concern that he has COPD, rather than just repetitive episodes of bronchitis.  This is based upon his history of symptoms and chest x-ray changes.  Today he is treated with prednisone 20 mg twice a day for 5 days, Zithromax 500 " mg today followed by 250 each the next 4 days.  Continue with albuterol inhaler every 4 hours as needed.  I would recommend after this treatment, that he follow-up with primary care provider to discuss spirometry testing.  Also, if not improving by end of the week, he should be seen for recheck.      JANNETH ALLEN MD  [facility]    This note was created using voice recognition software and was screened for errors in transcription.

## 2018-11-12 NOTE — PATIENT INSTRUCTIONS
Discharge Instructions: COPD  You have been diagnosed with chronic obstructive pulmonary disease (COPD). This is a name given to a group of diseases that limit the flow of air in and out of your lungs. This makes it harder to breathe. With COPD, you are also more likely to get lung infections. COPD includes chronic bronchitis and emphysema. COPD is most often caused by heavy, long-term cigarette smoking.  Home care  Quit smoking    If you smoke, quit. It is the best thing you can do for your COPD and your overall health.    Join a stop-smoking program. There are even telephone, text message, and Internet programs to help you quit.    Ask your healthcare provider about medicines or other methods to help you quit.    Ask family members to quit smoking as well.    Don't allow people to smoke in your home, in your car, or when they are around you.  Protect yourself from infection    Wash your hands often. Do your best to keep your hands away from your face. Most germs are spread from your hands to your mouth.    Get a flu shot every year. Also ask your provider about pneumonia vaccines.    Avoid crowds. It's especially important to do this in the winter when more people have colds and flu.    To stay healthy, get enough sleep, exercise regularly, and eat a balanced diet. You should:  ? Get about 8 hours of sleep every night.  ? Try to exercise for at least 30 minutes on most days.  ? Have healthy foods including fruits and vegetables, 100% whole grains, lean meats and fish, and low-fat dairy products. Try to stay away from foods high in fats and sugar.  Take your medicines  Take your medicines exactly as directed. Don't skip doses.  Manage your stress  Stress can make COPD worse. Use this stress management technique:    Find a quiet place and sit or lie in a comfortable position.    Close your eyes and perform breathing exercises for several minutes. Ask your provider about the best way to breathe.  Pulmonary  rehabilitation    Pulmonary rehab can help you feel better. These programs include exercise, breathing techniques, information about COPD, counseling, and help for smokers.    Ask your provider or your local hospital about programs in your area.  When to call your healthcare provider  Call your provider immediately if you have any of the following:    Shortness of breath, wheezing, or coughing    Increased mucus    Yellow, green, bloody, or smelly mucus    Fever or chills    Tightness in your chest that does not go away with rest or medicine    An irregular heartbeat or a feeling that your heart is beating very fast    Swollen ankles   Date Last Reviewed: 5/1/2016 2000-2018 [employer]. [address] [place] [address], [place] [address]. All rights reserved. This information is not intended as a substitute for professional medical care. Always follow your healthcare professional's instructions.

## 2018-11-12 NOTE — MR AVS SNAPSHOT
After Visit Summary   11/12/2018    Uriel Monsalve    MRN: [id number]           Patient Information     Date Of Birth          1960        Visit Information        Provider Department      11/12/2018 8:15 AM Almita Harris MD [facility]        Today's Diagnoses     Bronchitis    -  1    COPD exacerbation (H)          Care Instructions      Discharge Instructions: COPD  You have been diagnosed with chronic obstructive pulmonary disease (COPD). This is a name given to a group of diseases that limit the flow of air in and out of your lungs. This makes it harder to breathe. With COPD, you are also more likely to get lung infections. COPD includes chronic bronchitis and emphysema. COPD is most often caused by heavy, long-term cigarette smoking.  Home care  Quit smoking    If you smoke, quit. It is the best thing you can do for your COPD and your overall health.    Join a stop-smoking program. There are even telephone, text message, and Internet programs to help you quit.    Ask your healthcare provider about medicines or other methods to help you quit.    Ask family members to quit smoking as well.    Don't allow people to smoke in your home, in your car, or when they are around you.  Protect yourself from infection    Wash your hands often. Do your best to keep your hands away from your face. Most germs are spread from your hands to your mouth.    Get a flu shot every year. Also ask your provider about pneumonia vaccines.    Avoid crowds. It's especially important to do this in the winter when more people have colds and flu.    To stay healthy, get enough sleep, exercise regularly, and eat a balanced diet. You should:  ? Get about 8 hours of sleep every night.  ? Try to exercise for at least 30 minutes on most days.  ? Have healthy foods including fruits and vegetables, 100% whole grains, lean meats and fish, and low-fat dairy products. Try to stay away from foods  high in fats and sugar.  Take your medicines  Take your medicines exactly as directed. Don't skip doses.  Manage your stress  Stress can make COPD worse. Use this stress management technique:    Find a quiet place and sit or lie in a comfortable position.    Close your eyes and perform breathing exercises for several minutes. Ask your provider about the best way to breathe.  Pulmonary rehabilitation    Pulmonary rehab can help you feel better. These programs include exercise, breathing techniques, information about COPD, counseling, and help for smokers.    Ask your provider or your local hospital about programs in your area.  When to call your healthcare provider  Call your provider immediately if you have any of the following:    Shortness of breath, wheezing, or coughing    Increased mucus    Yellow, green, bloody, or smelly mucus    Fever or chills    Tightness in your chest that does not go away with rest or medicine    An irregular heartbeat or a feeling that your heart is beating very fast    Swollen ankles   Date Last Reviewed: 5/1/2016 2000-2018 [employer]. [address] [place] [address]. All rights reserved. This information is not intended as a substitute for professional medical care. Always follow your healthcare professional's instructions.                Follow-ups after your visit        Future tests that were ordered for you today     Open Future Orders        Priority Expected Expires Ordered    XR Chest 2 Views Routine 11/12/2018 11/12/2019 11/12/2018            Who to contact     If you have questions or need follow up information about today's clinic visit or your schedule please contact [facility] directly at [phone].  Normal or non-critical lab and imaging results will be communicated to you by MyChart, letter or phone within 4 business days after the clinic has received the results. If you do not hear from us within 7 days, please  "contact the clinic through [employer] or phone. If you have a critical or abnormal lab result, we will notify you by phone as soon as possible.  Submit refill requests through [employer] or call your pharmacy and they will forward the refill request to us. Please allow 3 business days for your refill to be completed.          Additional Information About Your Visit        Care EveryWhere ID     This is your Care EveryWhere ID. This could be used by other organizations to access your [facility] medical records  [id number]        Your Vitals Were     Pulse Temperature Height Pulse Oximetry BMI (Body Mass Index)       90 97.5  F (36.4  C) (Tympanic) 5' 10\" (1.778 m) 94% 28.12 kg/m2        Blood Pressure from Last 3 Encounters:   11/12/18 106/78   08/09/18 114/66   03/03/18 130/84    Weight from Last 3 Encounters:   11/12/18 196 lb (88.9 kg)   08/09/18 189 lb 9.6 oz (86 kg)   03/03/18 189 lb 4.8 oz (85.9 kg)                 Today's Medication Changes          These changes are accurate as of 11/12/18  8:53 AM.  If you have any questions, ask your nurse or doctor.               Start taking these medicines.        Dose/Directions    azithromycin 250 MG tablet   Commonly known as:  ZITHROMAX   Used for:  Bronchitis   Started by:  Almita Harris MD        Two tablets first day, then one tablet daily for four days.   Quantity:  6 tablet   Refills:  0       predniSONE 20 MG tablet   Commonly known as:  DELTASONE   Used for:  Bronchitis, COPD exacerbation (H)   Started by:  Almita Harris MD        Dose:  20 mg   Take 1 tablet (20 mg) by mouth 2 times daily   Quantity:  10 tablet   Refills:  0         These medicines have changed or have updated prescriptions.        Dose/Directions    * albuterol 108 (90 Base) MCG/ACT Aepb inhaler   Commonly known as:  PROAIR RESPICLICK   This may have changed:  Another medication with the same name was added. Make sure you understand how and when to take each.   Used " for:  Bronchitis   Changed by:  Almita Harris MD        Dose:  2 puff   Inhale 2 puffs into the lungs every 4 hours   Quantity:  1 each   Refills:  3       * albuterol 108 (90 Base) MCG/ACT inhaler   Commonly known as:  PROAIR HFA/PROVENTIL HFA/VENTOLIN HFA   This may have changed:  You were already taking a medication with the same name, and this prescription was added. Make sure you understand how and when to take each.   Used for:  Bronchitis, COPD exacerbation (H)   Changed by:  Almita Harris MD        Dose:  2 puff   Inhale 2 puffs into the lungs every 6 hours as needed for shortness of breath / dyspnea or wheezing   Quantity:  1 Inhaler   Refills:  1       * Notice:  This list has 2 medication(s) that are the same as other medications prescribed for you. Read the directions carefully, and ask your doctor or other care provider to review them with you.         Where to get your medicines      These medications were sent to [facility] [address] [place] [address]     Phone:  [phone]     albuterol 108 (90 Base) MCG/ACT inhaler    azithromycin 250 MG tablet    predniSONE 20 MG tablet                Primary Care Provider Fax #    Physician No Ref-Primary [phone]       No address on file        Equal Access to Services     JOHN HAHN AH: Gilberto duncan Soradha, waaxda luqadaha, qaybta kaalmada adeegjean claude, gloria taylor. So [facility] [phone].    ATENCIÓN: Si habla español, tiene a munguia disposición servicios gratuitos de asistencia lingüística. Llame al [phone].    We comply with applicable federal civil rights laws and [place] laws. We do not discriminate on the basis of race, color, national origin, age, disability, sex, sexual orientation, or gender identity.            Thank you!     Thank you for choosing [facility]  for your care. Our goal is  always to provide you with excellent care. Hearing back from our patients is one way we can continue to improve our services. Please take a few minutes to complete the written survey that you may receive in the mail after your visit with us. Thank you!             Your Updated Medication List - Protect others around you: Learn how to safely use, store and throw away your medicines at www.disposemymeds.org.          This list is accurate as of 11/12/18  8:53 AM.  Always use your most recent med list.                   Brand Name Dispense Instructions for use Diagnosis    * albuterol 108 (90 Base) MCG/ACT Aepb inhaler    PROAIR RESPICLICK    1 each    Inhale 2 puffs into the lungs every 4 hours    Bronchitis       * albuterol 108 (90 Base) MCG/ACT inhaler    PROAIR HFA/PROVENTIL HFA/VENTOLIN HFA    1 Inhaler    Inhale 2 puffs into the lungs every 6 hours as needed for shortness of breath / dyspnea or wheezing    Bronchitis, COPD exacerbation (H)       azithromycin 250 MG tablet    ZITHROMAX    6 tablet    Two tablets first day, then one tablet daily for four days.    Bronchitis       calcium polycarbophil 625 MG tablet    FIBERCON     Take 2 tablets by mouth daily        Fish Oil 500 MG Caps      Take 1 capsule by mouth daily        MULTI-VITAMINS Tabs      Take 1 tablet by mouth daily        predniSONE 20 MG tablet    DELTASONE    10 tablet    Take 1 tablet (20 mg) by mouth 2 times daily    Bronchitis, COPD exacerbation (H)       saccharomyces boulardii 250 MG capsule    FLORASTOR     Take 250 mg by mouth 2 times daily        VITAMIN C PO      Take 500 mg by mouth daily        vitamin E 200 UNIT capsule      Take 1 capsule by mouth daily        * Notice:  This list has 2 medication(s) that are the same as other medications prescribed for you. Read the directions carefully, and ask your doctor or other care provider to review them with you.

## 2019-02-07 ENCOUNTER — OFFICE VISIT (OUTPATIENT)
Dept: FAMILY MEDICINE | Facility: OTHER | Age: 59
End: 2019-02-07
Attending: FAMILY MEDICINE
Payer: COMMERCIAL

## 2019-02-07 VITALS
HEART RATE: 60 BPM | SYSTOLIC BLOOD PRESSURE: 128 MMHG | WEIGHT: 202 LBS | DIASTOLIC BLOOD PRESSURE: 68 MMHG | OXYGEN SATURATION: 89 % | RESPIRATION RATE: 24 BRPM | BODY MASS INDEX: 28.98 KG/M2 | TEMPERATURE: 98 F

## 2019-02-07 DIAGNOSIS — J41.1 MUCOPURULENT CHRONIC BRONCHITIS (H): Primary | ICD-10-CM

## 2019-02-07 PROBLEM — J40 BRONCHITIS: Status: RESOLVED | Noted: 2018-03-03 | Resolved: 2019-02-07

## 2019-02-07 PROCEDURE — 99213 OFFICE O/P EST LOW 20 MIN: CPT | Performed by: FAMILY MEDICINE

## 2019-02-07 RX ORDER — EPINEPHRINE 0.3 MG/.3ML
0.3 INJECTION SUBCUTANEOUS PRN
Qty: 0.3 ML | Refills: 1 | Status: SHIPPED | OUTPATIENT
Start: 2019-02-07 | End: 2022-12-29

## 2019-02-07 RX ORDER — PREDNISONE 20 MG/1
TABLET ORAL
Qty: 20 TABLET | Refills: 2 | Status: SHIPPED | OUTPATIENT
Start: 2019-02-07 | End: 2019-07-12

## 2019-02-07 RX ORDER — DOXYCYCLINE HYCLATE 100 MG
100 TABLET ORAL 2 TIMES DAILY
Qty: 20 TABLET | Refills: 0 | Status: SHIPPED | OUTPATIENT
Start: 2019-02-07 | End: 2019-07-12

## 2019-02-07 ASSESSMENT — PAIN SCALES - GENERAL: PAINLEVEL: NO PAIN (0)

## 2019-02-07 ASSESSMENT — ENCOUNTER SYMPTOMS
ABDOMINAL PAIN: 0
NAUSEA: 0
CHILLS: 1
FEVER: 1
WHEEZING: 1
SHORTNESS OF BREATH: 1
SINUS PRESSURE: 1
COUGH: 1
CONSTIPATION: 0
SORE THROAT: 0
HEADACHES: 0
DIARRHEA: 0

## 2019-02-07 NOTE — NURSING NOTE
Patient here for cough foer the past 2 months. He is not sure if its allergy or asthma  Induced. Pressure in the back of the ears. Medication Reconciliation: complete.    Reina Jordan LPN  2/7/2019 2:28 PM

## 2019-02-07 NOTE — PROGRESS NOTES
"SUBJECTIVE:   Uriel Monsalve is a 58 year old male who presents to clinic today for the following health issues: respiratory issues.    HPI  For the last two months he has been suffering from what he says is \"allergies and asthma flare up.\"  He endorses a productive cough, sinus pressure, ear aches, and a feeling that he \"just can't clear his throat.\"  He has never been formally diagnosed with asthma but takes an albuterol inhaler.  He has been using this inhaler 8-10x/day with improvement of his breathing.  He says he gets short of breath and his cough gets much worse when he is physically exerting himself or out in the cold weather.  He says he has felt warm lately but never recorded a fever.  He has been very wheezy lately.  He is a former smoker and quit three months ago.    Patient Active Problem List    Diagnosis Date Noted     Mucopurulent chronic bronchitis (H) 2019     Priority: Medium     Psychosexual dysfunction with inhibited sexual excitement 2018     Priority: Medium     Diverticulosis of sigmoid colon 2016     Priority: Medium     H/O adenomatous polyp of colon 2016     Priority: Medium     Hypercholesterolemia 2016     Priority: Medium     Hives 2016     Priority: Medium     No past medical history on file.   Past Surgical History:   Procedure Laterality Date     COLONOSCOPY  2016,F/U      ENDOSCOPIC SINUS SURGERY      No Comments Provided     EXTRACTION(S) DENTAL      No Comments Provided     OTHER SURGICAL HISTORY      432101,OTHER     Social History     Tobacco Use     Smoking status: Former Smoker     Packs/day: 1.00     Types: Cigarettes     Last attempt to quit: 2018     Years since quittin.2     Smokeless tobacco: Never Used   Substance Use Topics     Alcohol use: No     Alcohol/week: 0.0 oz       Review of Systems   Constitutional: Positive for chills and fever.   HENT: Positive for congestion, ear pain and sinus pressure. " Negative for sore throat.    Respiratory: Positive for cough, shortness of breath and wheezing.    Cardiovascular: Negative for chest pain.   Gastrointestinal: Negative for abdominal pain, constipation, diarrhea and nausea.   Neurological: Negative for headaches.        OBJECTIVE:     /68 (BP Location: Right arm)   Pulse 60   Temp 98  F (36.7  C)   Resp 24   Wt 91.6 kg (202 lb)   SpO2 (!) 89%   BMI 28.98 kg/m    Body mass index is 28.98 kg/m .  Physical Exam   Constitutional: He appears well-developed and well-nourished. No distress.   HENT:   Right Ear: Tympanic membrane normal.   Left Ear: Tympanic membrane normal.   Mouth/Throat: Oropharynx is clear and moist. No oropharyngeal exudate.   Cardiovascular: Normal rate, regular rhythm and normal heart sounds.   No murmur heard.  Pulmonary/Chest: Tachypnea noted. No respiratory distress. He has wheezes.   Lymphadenopathy:     He has no cervical adenopathy.   Skin: Skin is warm and dry.       Diagnostic Test Results:  none     ASSESSMENT/PLAN:       1. Mucopurulent chronic bronchitis (H)  Due to the patient's low oxygen saturation, a discussion was had about admitting him to the hospital.  He chose not to go that route.  Nebulizer was also offered and he said that he didn't need that either.  He was given a prescription for prednisone and due to the duration of his symptoms, doxycycline was added to the treatment regimen.  An Epipen was given to be used in emergency cases of respiratory distress.  If his symptoms were worsening or if he has any cases of respiratory distress, he should be seen again.      - predniSONE (DELTASONE) 20 MG tablet; Take 60 mg by mouth daily for 3 days, THEN 40 mg daily for 3 days, THEN 20 mg daily for 3 days, THEN 10 mg daily for 3 days.  Dispense: 20 tablet; Refill: 2  - doxycycline hyclate (VIBRA-TABS) 100 MG tablet; Take 1 tablet (100 mg) by mouth 2 times daily for 10 days  Dispense: 20 tablet; Refill: 0  - EPINEPHrine  (EPIPEN/ADRENACLICK/OR ANY BX GENERIC EQUIV) 0.3 MG/0.3ML injection 2-pack; Inject 0.3 mLs (0.3 mg) into the muscle as needed (sob)  Dispense: 0.3 mL; Refill: 1    Patient was offered a nebulization but states he did not feel he was that bad to need it.  Also recommended continue to refrain from smoking.  Fermin Rios, HUSSEIN IV    Fernando Macedo MD agree with above  [facility]

## 2019-02-15 DIAGNOSIS — J40 BRONCHITIS: ICD-10-CM

## 2019-07-12 ENCOUNTER — HOSPITAL ENCOUNTER (OUTPATIENT)
Dept: GENERAL RADIOLOGY | Facility: OTHER | Age: 59
Discharge: HOME | End: 2019-07-12
Attending: NURSE PRACTITIONER | Admitting: NURSE PRACTITIONER
Payer: COMMERCIAL

## 2019-07-12 ENCOUNTER — OFFICE VISIT (OUTPATIENT)
Dept: FAMILY MEDICINE | Facility: OTHER | Age: 59
End: 2019-07-12
Attending: NURSE PRACTITIONER
Payer: COMMERCIAL

## 2019-07-12 VITALS
HEIGHT: 70 IN | HEART RATE: 80 BPM | RESPIRATION RATE: 24 BRPM | DIASTOLIC BLOOD PRESSURE: 82 MMHG | WEIGHT: 201.6 LBS | SYSTOLIC BLOOD PRESSURE: 124 MMHG | TEMPERATURE: 98.6 F | OXYGEN SATURATION: 88 % | BODY MASS INDEX: 28.86 KG/M2

## 2019-07-12 DIAGNOSIS — R06.2 WHEEZING: ICD-10-CM

## 2019-07-12 DIAGNOSIS — J44.1 OBSTRUCTIVE CHRONIC BRONCHITIS WITH EXACERBATION (H): Primary | ICD-10-CM

## 2019-07-12 DIAGNOSIS — J40 BRONCHITIS: ICD-10-CM

## 2019-07-12 DIAGNOSIS — R05.9 COUGH: ICD-10-CM

## 2019-07-12 PROCEDURE — 71046 X-RAY EXAM CHEST 2 VIEWS: CPT

## 2019-07-12 PROCEDURE — 25000125 ZZHC RX 250: Performed by: NURSE PRACTITIONER

## 2019-07-12 PROCEDURE — 99214 OFFICE O/P EST MOD 30 MIN: CPT | Performed by: NURSE PRACTITIONER

## 2019-07-12 PROCEDURE — 94640 AIRWAY INHALATION TREATMENT: CPT | Performed by: NURSE PRACTITIONER

## 2019-07-12 RX ORDER — IPRATROPIUM BROMIDE AND ALBUTEROL SULFATE 2.5; .5 MG/3ML; MG/3ML
3 SOLUTION RESPIRATORY (INHALATION)
Status: DISCONTINUED | OUTPATIENT
Start: 2019-07-12 | End: 2019-08-30

## 2019-07-12 RX ORDER — PREDNISONE 20 MG/1
40 TABLET ORAL DAILY
Qty: 10 TABLET | Refills: 0 | Status: SHIPPED | OUTPATIENT
Start: 2019-07-12 | End: 2019-07-30

## 2019-07-12 RX ORDER — DOXYCYCLINE 100 MG/1
100 CAPSULE ORAL 2 TIMES DAILY
Qty: 20 CAPSULE | Refills: 0 | Status: SHIPPED | OUTPATIENT
Start: 2019-07-12 | End: 2019-07-30

## 2019-07-12 RX ADMIN — IPRATROPIUM BROMIDE AND ALBUTEROL SULFATE 3 ML: .5; 3 SOLUTION RESPIRATORY (INHALATION) at 11:41

## 2019-07-12 ASSESSMENT — PAIN SCALES - GENERAL: PAINLEVEL: NO PAIN (0)

## 2019-07-12 ASSESSMENT — MIFFLIN-ST. JEOR: SCORE: 1730.7

## 2019-07-12 NOTE — PROGRESS NOTES
"HPI:    Uriel Monsalve is a 58 year old male who presents to clinic today for cough and shortness of breath.  He reports that he gets a couple times a year \"allergy and asthma flares\".  He does use an inhaler for this as needed.  He reports over the past few days symptoms have progressively worsened.  He is having a lot of shortness of breath and wheezing.  Frequent coughing.  Denies any fevers.  He is using his inhaler 8-10 times a day which is moderately helpful.  He has not ever had any formal pulmonary function testing.  History of smoking, quit 6 months ago.    History reviewed. No pertinent past medical history.    Past Surgical History:   Procedure Laterality Date     COLONOSCOPY  2016,F/U      ENDOSCOPIC SINUS SURGERY      No Comments Provided     EXTRACTION(S) DENTAL      No Comments Provided     OTHER SURGICAL HISTORY      [id number],OTHER       Family History   Problem Relation Age of Onset     Cancer Father         Cancer     Diabetes Other         Diabetes,Family history     Cancer Brother         Cancer,neck     Cancer Brother         Cancer,throat       Social History     Socioeconomic History     Marital status:      Spouse name: Not on file     Number of children: Not on file     Years of education: Not on file     Highest education level: Not on file   Occupational History     Not on file   Social Needs     Financial resource strain: Not on file     Food insecurity:     Worry: Not on file     Inability: Not on file     Transportation needs:     Medical: Not on file     Non-medical: Not on file   Tobacco Use     Smoking status: Former Smoker     Packs/day: 1.00     Types: Cigarettes     Last attempt to quit: 2018     Years since quittin.6     Smokeless tobacco: Never Used   Substance and Sexual Activity     Alcohol use: No     Alcohol/week: 0.0 oz     Drug use: No     Types: Other     Comment: Drug use: No     Sexual activity: Yes     Partners: Female   Lifestyle     " "Physical activity:     Days per week: Not on file     Minutes per session: Not on file     Stress: Not on file   Relationships     Social connections:     Talks on phone: Not on file     Gets together: Not on file     Attends [employer] service: Not on file     Active member of club or organization: Not on file     Attends meetings of clubs or organizations: Not on file     Relationship status: Not on file     Intimate partner violence:     Fear of current or ex partner: Not on file     Emotionally abused: Not on file     Physically abused: Not on file     Forced sexual activity: Not on file   Other Topics Concern     Not on file   Social History Narrative    . 3 children and 3 step children  [employer]       Current Outpatient Medications   Medication Sig Dispense Refill     albuterol (PROAIR RESPICLICK) 108 (90 Base) MCG/ACT inhaler Inhale 2 puffs into the lungs every 4 hours 1 each 3     Ascorbic Acid (VITAMIN C PO) Take 500 mg by mouth daily       calcium polycarbophil (FIBERCON) 625 MG tablet Take 2 tablets by mouth daily       doxycycline hyclate (VIBRAMYCIN) 100 MG capsule Take 1 capsule (100 mg) by mouth 2 times daily for 10 days 20 capsule 0     EPINEPHrine (EPIPEN/ADRENACLICK/OR ANY BX GENERIC EQUIV) 0.3 MG/0.3ML injection 2-pack Inject 0.3 mLs (0.3 mg) into the muscle as needed (sob) 0.3 mL 1     Multiple Vitamin (MULTI-VITAMINS) TABS Take 1 tablet by mouth daily       Omega-3 Fatty Acids (FISH OIL) 500 MG CAPS Take 1 capsule by mouth daily       predniSONE (DELTASONE) 20 MG tablet Take 2 tablets (40 mg) by mouth daily for 5 days 10 tablet 0     saccharomyces boulardii (FLORASTOR) 250 MG capsule Take 250 mg by mouth 2 times daily         No Known Allergies    ROS:  Pertinent positives and negatives are noted in HPI.    EXAM:  /82   Pulse 80   Temp 98.6  F (37  C)   Resp 24   Ht 1.778 m (5' 10\")   Wt 91.4 kg (201 lb 9.6 oz)   SpO2 (!) 88%   BMI 28.93 kg/m    General " appearance: well appearing male, in no acute distress  Head: normocephalic, atraumatic  Ears: TM's with cone of light, no erythema, canals clear bilaterally  Eyes: conjunctivae normal  Orophayrnx: moist mucous membranes, tonsils without erythema, exudates or petechiae, no post nasal drip seen  Neck: supple without adenopathy  Respiratory: Rales and rhonchi throughout, expiratory wheezes noted.  Frequent coughing.  O2 sats 88% on room air.  DuoNeb was given with increasing oxygen level at 99% on room air with decreased rales and rhonchi as well as wheezing.  Less shortness of breath noted.  Cardiac: RRR with no murmurs  Psychological: normal affect, alert and pleasant  Xray: xray independently reviewed and no acute findings appreciated; pending radiology over-read    ASSESSMENT AND PLAN:    1. Obstructive chronic bronchitis with exacerbation (H)    2. Cough    3. Wheezing    4. Bronchitis      Chronic bronchitis with exacerbation noted today.  We will treat him with prednisone and doxycycline.  I did refill his inhaler today.  I recommend that he follows up with primary care in 1 to 2 weeks to ensure his symptoms are improving as well as to consider pulmonary function testing.  He is in agreement with this plan.    Santa Malik..................7/12/2019 10:45 AM      This document was prepared using voice generated software.  While every attempt was made for accuracy, grammatical errors may exist.

## 2019-07-12 NOTE — NURSING NOTE
"Patient presents today for cough and shortness of breath. Patient states, \" he has allergy induced asthma\".   Medication Reconciliation Complete    Zakia Mccabe LPN  7/12/2019 10:50 AM  "

## 2019-07-30 ENCOUNTER — OFFICE VISIT (OUTPATIENT)
Dept: FAMILY MEDICINE | Facility: OTHER | Age: 59
End: 2019-07-30
Attending: NURSE PRACTITIONER
Payer: COMMERCIAL

## 2019-07-30 VITALS
OXYGEN SATURATION: 97 % | HEIGHT: 70 IN | HEART RATE: 80 BPM | DIASTOLIC BLOOD PRESSURE: 72 MMHG | TEMPERATURE: 98.2 F | SYSTOLIC BLOOD PRESSURE: 128 MMHG | RESPIRATION RATE: 14 BRPM | WEIGHT: 204 LBS | BODY MASS INDEX: 29.2 KG/M2

## 2019-07-30 DIAGNOSIS — Z87.2 HISTORY OF ALLERGIC URTICARIA: ICD-10-CM

## 2019-07-30 DIAGNOSIS — Z87.891 HISTORY OF TOBACCO USE: ICD-10-CM

## 2019-07-30 DIAGNOSIS — J45.901 REACTIVE AIRWAY DISEASE WITH ACUTE EXACERBATION, UNSPECIFIED ASTHMA SEVERITY, UNSPECIFIED WHETHER PERSISTENT: ICD-10-CM

## 2019-07-30 DIAGNOSIS — J44.1 COPD EXACERBATION (H): Primary | ICD-10-CM

## 2019-07-30 PROCEDURE — 99214 OFFICE O/P EST MOD 30 MIN: CPT | Performed by: NURSE PRACTITIONER

## 2019-07-30 PROCEDURE — 25000125 ZZHC RX 250: Performed by: NURSE PRACTITIONER

## 2019-07-30 PROCEDURE — 94640 AIRWAY INHALATION TREATMENT: CPT | Performed by: NURSE PRACTITIONER

## 2019-07-30 RX ORDER — INHALER, ASSIST DEVICES
SPACER (EA) MISCELLANEOUS
Qty: 1 EACH | Refills: 0 | Status: SHIPPED | OUTPATIENT
Start: 2019-07-30 | End: 2022-12-23

## 2019-07-30 RX ORDER — IPRATROPIUM BROMIDE AND ALBUTEROL SULFATE 2.5; .5 MG/3ML; MG/3ML
3 SOLUTION RESPIRATORY (INHALATION)
Status: DISCONTINUED | OUTPATIENT
Start: 2019-07-30 | End: 2019-08-30

## 2019-07-30 RX ORDER — CETIRIZINE HYDROCHLORIDE 10 MG/1
10 TABLET ORAL DAILY
Qty: 90 TABLET | Refills: 1 | Status: SHIPPED | OUTPATIENT
Start: 2019-07-30 | End: 2021-03-01

## 2019-07-30 RX ORDER — ALBUTEROL SULFATE 0.83 MG/ML
2.5 SOLUTION RESPIRATORY (INHALATION) EVERY 4 HOURS PRN
Qty: 1 BOX | Refills: 1 | Status: SHIPPED | OUTPATIENT
Start: 2019-07-30 | End: 2019-08-30

## 2019-07-30 RX ORDER — PREDNISONE 10 MG/1
TABLET ORAL
Qty: 30 TABLET | Refills: 0 | Status: SHIPPED | OUTPATIENT
Start: 2019-07-30 | End: 2019-08-30

## 2019-07-30 RX ORDER — ALBUTEROL SULFATE 0.83 MG/ML
2.5 SOLUTION RESPIRATORY (INHALATION) EVERY 4 HOURS PRN
Qty: 1 BOX | Refills: 1 | Status: SHIPPED | OUTPATIENT
Start: 2019-07-30 | End: 2019-07-30

## 2019-07-30 RX ORDER — BUDESONIDE 0.5 MG/2ML
0.5 INHALANT ORAL 2 TIMES DAILY
Status: DISCONTINUED | OUTPATIENT
Start: 2019-07-30 | End: 2019-08-30

## 2019-07-30 RX ADMIN — IPRATROPIUM BROMIDE AND ALBUTEROL SULFATE 3 ML: .5; 3 SOLUTION RESPIRATORY (INHALATION) at 11:45

## 2019-07-30 RX ADMIN — BUDESONIDE 0.5 MG: 0.5 INHALANT RESPIRATORY (INHALATION) at 11:43

## 2019-07-30 ASSESSMENT — ENCOUNTER SYMPTOMS
WHEEZING: 1
COUGH: 1
RHINORRHEA: 1
SHORTNESS OF BREATH: 1

## 2019-07-30 ASSESSMENT — MIFFLIN-ST. JEOR: SCORE: 1741.59

## 2019-07-30 ASSESSMENT — PAIN SCALES - GENERAL: PAINLEVEL: SEVERE PAIN (7)

## 2019-07-30 NOTE — LETTER
My COPD Action Plan     Name: Uriel Monsalve    YOB: 1960   Date: 7/30/2019    My doctor: SARAH Cintron CNP   My clinic: [facility]""    [address]  [place] [id number] [phone]  My Controller Medicine: none   Dose: none     My Rescue Medicine: Albuterol (Proair/Ventolin/Proventil) inhaler   Dose: 2 puffs into lungs every 4 hours     My Flare Up Medicine: Doxycycline (Doryx, Monodox, Vibramycin)   Dose: 1 Cap (100mg) by mouth 2 times daily     My COPD Severity: Severe = FeV1 < 30%-49% and Severe due to frequent exacerbations ( > 2 per year)      Use of Oxygen: Oxygen Not Prescribed      Make sure you've had your pneumonia   vaccines.          GREEN ZONE       Doing well today      Usual level of activity and exercise    Usual amount of cough and mucus    No shortness of breath    Usual level of health (thinking clearly, sleeping well, feel like eating) Actions:      Take daily medicines    Use oxygen as prescribed    Follow regular exercise and diet plan    Avoid cigarette smoke and other irritants that harm the lungs           YELLOW ZONE          Having a bad day or flare up      Short of breath more than usual    A lot more sputum (mucus) than usual    Sputum looks yellow, green, tan, brown or bloody    More coughing or wheezing    Fever or chills    Less energy; trouble completing activities    Trouble thinking or focusing    Using quick relief inhaler or nebulizer more often    Poor sleep; symptoms wake me up    Do not feel like eating Actions:      Get plenty of rest    Take daily medicines    Use quick relief inhaler every 4 hours    If you use oxygen, call you doctor to see if you should adjust your oxygen    Do breathing exercises or other things to help you relax    Let a loved one, friend or neighbor know you are feeling worse    Call your care team if you have 2 or more symptoms.  Start taking steroids or antibiotics if directed by your care  team           RED ZONE       Need medical care now      Severe shortness of breath (feel you can't breathe)    Fever, chills    Not enough breath to do any activity    Trouble coughing up mucus, walking or talking    Blood in mucus    Frequent coughing   Rescue medicines are not working    Not able to sleep because of breathing    Feel confused or drowsy    Chest pain    Actions:      Call your health care team.  If you cannot reach your care team, call 911 or go to the emergency room.        Annual Reminders:  Meet with Care Team, Flu Shot every Fall  Pharmacy: [id number] - [address]

## 2019-07-30 NOTE — PROGRESS NOTES
Nursing Notes:   Kathie Wayne LPN  7/30/2019 10:30 AM  Signed  Patient presents to clinic today for allergy/asthma/COPD. He has been dealing with the coughing and SOB since December. He stated that he continues to cough and sometimes there is production and sometimes not. Hard to catch his breathe. He would like a neb treatment. And interested in a Prescription to take a nebulizer home today.     Patient broke out in hives, took benadryl to relieve those.     No LMP for male patient.  Medication Reconciliation: complete    Kathie Wayne LPN  7/30/2019 10:19 AM    Nursing note reviewed with patient.  Accurracy and completeness verified.   Mr. Monsalve is a 58 year old male who:  Patient presents with:  Asthma      ICD-10-CM    1. COPD exacerbation (H) J44.1 ipratropium - albuterol 0.5 mg/2.5 mg/3 mL (DUONEB) neb solution 3 mL     budesonide (PULMICORT) neb solution 0.5 mg     order for DME     ALLERGY/ASTHMA ADULT REFERRAL     [facility] ([facility]) holding chamber     albuterol (PROVENTIL) (2.5 MG/3ML) 0.083% neb solution     albuterol (PROAIR RESPICLICK) 108 (90 Base) MCG/ACT inhaler     cetirizine (ZYRTEC) 10 MG tablet     predniSONE (DELTASONE) 10 MG tablet     General PFT Lab (Please always keep checked)     Pulmonary Function Test     INHALATION/NEBULIZER TREATMENT, INITIAL     DISCONTINUED: albuterol (PROVENTIL) (2.5 MG/3ML) 0.083% neb solution   2. History of allergic urticaria Z87.2 ALLERGY/ASTHMA ADULT REFERRAL     cetirizine (ZYRTEC) 10 MG tablet     predniSONE (DELTASONE) 10 MG tablet     General PFT Lab (Please always keep checked)     Pulmonary Function Test   3. Reactive airway disease with acute exacerbation, unspecified asthma severity, unspecified whether persistent J45.901    4. History of tobacco use Z87.891      HPI   Presents for follow-up on intermittent episodes of shortness of breath sometimes productive cough  Wheezing reported an episode of hives--on his shoulder and arm--relieved and  controlled with Benadryl use however this causes drowsiness  He has been having sneezing attacks and allergic conjunctivitis and rhinitis--has never had allergy testing  He was seen about 10 days ago and had a negative chest x-ray--was saturating well  Given a nebulizer treatment in the clinic which was helpful--- he has a Ventolin inhaler however cannot get the medication effectively when he is flaring  He is interested in a nebulizer to use at home as needed  He was seen back in January for similar symptoms however he was mildly hypoxic at that time but had refused admission to the hospital  At home on doxycycline and prednisone which improved temporarily  He was also given an EpiPen to use--is not certain when he should use this and what it is for except allergies in general  He does have  and over 30-year tobacco history quit last November 2018  He denies any recent cold, fevers, chills--- uncertain of allergies but feels he is having allergic reactive symptoms  Does not take any daily nondrowsy antihistamine such as Zyrtec Allegra or Claritin  He has never been formally diagnosed with COPD or asthma  Has not had any pulmonary function studies a regular follow-up        Review of Systems   HENT: Positive for congestion, postnasal drip and rhinorrhea.    Respiratory: Positive for cough, shortness of breath and wheezing.    Skin: Positive for rash.   Allergic/Immunologic: Positive for environmental allergies.   All other systems reviewed and are negative.     All other systems reviewed and negative.     TRACI:   No flowsheet data found.  PHQ9:  No flowsheet data found.    I have personally reviewed the past medical history, past surgical history, medications, allergies, family and social history as listed below, on 7/30/2019.    No Known Allergies    Current Outpatient Medications   Medication Sig Dispense Refill     albuterol (PROAIR RESPICLICK) 108 (90 Base) MCG/ACT inhaler Inhale 2 puffs into the lungs every 4  hours as needed for shortness of breath / dyspnea or wheezing 1 each 3     albuterol (PROVENTIL) (2.5 MG/3ML) 0.083% neb solution Take 1 vial (2.5 mg) by nebulization every 4 hours as needed for shortness of breath / dyspnea or wheezing 1 Box 1     Ascorbic Acid (VITAMIN C PO) Take 500 mg by mouth daily       calcium polycarbophil (FIBERCON) 625 MG tablet Take 2 tablets by mouth daily       cetirizine (ZYRTEC) 10 MG tablet Take 1 tablet (10 mg) by mouth daily 90 tablet 1     EPINEPHrine (EPIPEN/ADRENACLICK/OR ANY BX GENERIC EQUIV) 0.3 MG/0.3ML injection 2-pack Inject 0.3 mLs (0.3 mg) into the muscle as needed (sob) 0.3 mL 1     Multiple Vitamin (MULTI-VITAMINS) TABS Take 1 tablet by mouth daily       Omega-3 Fatty Acids (FISH OIL) 500 MG CAPS Take 1 capsule by mouth daily       order for DME Equipment being ordered: Nebulizer 1 Product 1     predniSONE (DELTASONE) 10 MG tablet Take 3 tabs daily x 5 days, 2 tabs daily x 5 days, 1 tab daily x 5 days 30 tablet 0     saccharomyces boulardii (FLORASTOR) 250 MG capsule Take 250 mg by mouth 2 times daily       spacer (OPTICHAMBER CE) holding chamber Use with inhaler 1 each 0        Patient Active Problem List    Diagnosis Date Noted     Reactive airway disease with acute exacerbation, unspecified asthma severity, unspecified whether persistent 07/30/2019     Priority: Medium     History of tobacco use 07/30/2019     Priority: Medium     Mucopurulent chronic bronchitis (H) 02/07/2019     Priority: Medium     Psychosexual dysfunction with inhibited sexual excitement 02/01/2018     Priority: Medium     Diverticulosis of sigmoid colon 01/25/2016     Priority: Medium     H/O adenomatous polyp of colon 01/25/2016     Priority: Medium     Hypercholesterolemia 01/12/2016     Priority: Medium     Hives 01/11/2016     Priority: Medium     History reviewed. No pertinent past medical history.  Past Surgical History:   Procedure Laterality Date     COLONOSCOPY  01/25/2016     Serrated adenoma  ,F/U      ENDOSCOPIC SINUS SURGERY      No Comments Provided     EXTRACTION(S) DENTAL      No Comments Provided     OTHER SURGICAL HISTORY      [id number],OTHER     Social History     Socioeconomic History     Marital status:      Spouse name: None     Number of children: None     Years of education: None     Highest education level: None   Occupational History     None   Social Needs     Financial resource strain: None     Food insecurity:     Worry: None     Inability: None     Transportation needs:     Medical: None     Non-medical: None   Tobacco Use     Smoking status: Former Smoker     Packs/day: 1.00     Years: 35.00     Pack years: 35.00     Types: Cigarettes     Last attempt to quit: 2018     Years since quittin.7     Smokeless tobacco: Never Used   Substance and Sexual Activity     Alcohol use: No     Alcohol/week: 0.0 oz     Drug use: No     Types: Other     Comment: Drug use: No     Sexual activity: Yes     Partners: Female   Lifestyle     Physical activity:     Days per week: None     Minutes per session: None     Stress: None   Relationships     Social connections:     Talks on phone: None     Gets together: None     Attends Roman [employer] service: None     Active member of club or organization: None     Attends meetings of clubs or organizations: None     Relationship status: None     Intimate partner violence:     Fear of current or ex partner: None     Emotionally abused: None     Physically abused: None     Forced sexual activity: None   Other Topics Concern     None   Social History Narrative    . 3 children and 3 step children  [employer]     Family History   Problem Relation Age of Onset     Cancer Father         Cancer     Diabetes Other         Diabetes,Family history     Cancer Brother         Cancer,neck     Cancer Brother         Cancer,throat       EXAM:   Vitals:    19 1014   BP: 128/72   BP Location: Right arm   Patient Position:  "Sitting   Cuff Size: Adult Regular   Pulse: 80   Resp: 14   Temp: 98.2  F (36.8  C)   TempSrc: Temporal   SpO2: 97%   Weight: 92.5 kg (204 lb)   Height: 1.778 m (5' 10\")       Current Pain Score: Severe Pain (7)     BP Readings from Last 3 Encounters:   07/30/19 128/72   07/12/19 124/82   02/07/19 128/68      Wt Readings from Last 3 Encounters:   07/30/19 92.5 kg (204 lb)   07/12/19 91.4 kg (201 lb 9.6 oz)   02/07/19 91.6 kg (202 lb)      Estimated body mass index is 29.27 kg/m  as calculated from the following:    Height as of this encounter: 1.778 m (5' 10\").    Weight as of this encounter: 92.5 kg (204 lb).     Physical Exam   Constitutional: He is oriented to person, place, and time. He appears well-developed and well-nourished.   HENT:   Head: Normocephalic and atraumatic.   Right Ear: External ear normal.   Left Ear: External ear normal.   Mouth/Throat: Oropharynx is clear and moist.   Eyes:   Bilateral conjunctival injection clear exudate  Inner and outer canthus-no purulence-   Neck: Normal range of motion. Neck supple.   Cardiovascular: Normal rate and regular rhythm.   Pulmonary/Chest: Effort normal. He has wheezes.   Musculoskeletal: Normal range of motion.   Neurological: He is alert and oriented to person, place, and time.   Skin: Skin is warm and dry.   Psychiatric: He has a normal mood and affect. His behavior is normal. Judgment and thought content normal.   Nursing note and vitals reviewed.      INVESTIGATIONS:  Results for orders placed or performed during the hospital encounter of 07/12/19   XR Chest 2 Views    Narrative    PROCEDURE:  XR CHEST 2 VW    HISTORY:  Cough; Wheezing.     COMPARISON:  11/12/2018    FINDINGS:   The cardiac silhouette is normal in size. The pulmonary vasculature is  normal.  The lungs are clear. No pleural effusion or pneumothorax.      Impression    IMPRESSION:  No acute cardiopulmonary disease.      NADINE LOPEZ MD       ASSESSMENT AND PLAN:  Problem List Items " Addressed This Visit        Respiratory    Reactive airway disease with acute exacerbation, unspecified asthma severity, unspecified whether persistent       Behavioral    History of tobacco use      Other Visit Diagnoses     COPD exacerbation (H)    -  Primary    Relevant Medications    ipratropium - albuterol 0.5 mg/2.5 mg/3 mL (DUONEB) neb solution 3 mL    budesonide (PULMICORT) neb solution 0.5 mg    order for DME    spacer (OPTICHAMBER CE) holding chamber    albuterol (PROVENTIL) (2.5 MG/3ML) 0.083% neb solution    albuterol (PROAIR RESPICLICK) 108 (90 Base) MCG/ACT inhaler    cetirizine (ZYRTEC) 10 MG tablet    predniSONE (DELTASONE) 10 MG tablet    Other Relevant Orders    ALLERGY/ASTHMA ADULT REFERRAL    General PFT Lab (Please always keep checked)    Pulmonary Function Test    INHALATION/NEBULIZER TREATMENT, INITIAL (Completed)    History of allergic urticaria        Relevant Medications    cetirizine (ZYRTEC) 10 MG tablet    predniSONE (DELTASONE) 10 MG tablet    Other Relevant Orders    ALLERGY/ASTHMA ADULT REFERRAL    General PFT Lab (Please always keep checked)    Pulmonary Function Test        Immediate relief with DuoNeb treatment during office visit--able to take deep breaths wheezes almost resolved  Feeling much improved  Given budesonide nebulizer treatment as well    Recommend daily broad-spectrum nondrowsy antihistamine such as Zyrtec  Benadryl as needed for hives or urticaria onset  Discussed if any facial swelling or angioedema symptoms present to ED immediately    Will refer for allergy evaluation  Will obtain pulmonary function studies    Given longer prednisone taper for inflammatory symptoms    Given DME for nebulizer in clinic that he brought home  Has medication to use at home as needed  Prescription for spacer sent for Ventolin inhaler and demonstrated how to use    Encouraged to follow-up with his primary Dr Macedo on his progress and for consistent continuity of care and  management          -- Expected clinical course discussed    -- Medications and their side effects discussed    Patient Instructions   Use your inhaler or nebulizer every 4 hours as needed for shortness breath or wheeze    Pay attention to triggers    You will get call from [facility] allergist for appointment    You will need to get lung function testing--will call to schedule    I would like you to take daily zyrtec every day--if you need to take bendryl for breakthrough allergies or hives take every 6 hours until reolves--always continue zyrtec    Avoid driving or operating machinery with star Macedo on your progress    If at any time you get facial swelling or difficulty breathing go to ER        Shereen Chan [facility]     Portions of this note were dictated using speech recognition software. The note has been proofread but errors in the text may have been overlooked. Please contact me if there are any concerns regarding the accuracy of the dictation.

## 2019-07-30 NOTE — NURSING NOTE
Patient presents to clinic today for allergy/asthma/COPD. He has been dealing with the coughing and SOB since December. He stated that he continues to cough and sometimes there is production and sometimes not. Hard to catch his breathe. He would like a neb treatment. And interested in a Prescription to take a nebulizer home today.     Patient broke out in hives, took benadryl to relieve those.     No LMP for male patient.  Medication Reconciliation: complete    Kathie Wayne LPN  7/30/2019 10:19 AM

## 2019-07-30 NOTE — LETTER
My Asthma Action Plan  Name: Uriel Monsalve   YOB: 1960  Date: 7/30/2019   My doctor: SARAH Cintron CNP   My clinic: [facility]        My Control Medicine:   My Rescue Medicine: Albuterol (Proair/Ventolin/Proventil) inhaler 2 puffs into lungs every 4 hours  My Oral Steroid Medicine: Prednisone, 1 capsule 2 times per day My Asthma Severity: severe persistent  Avoid your asthma triggers: smoke, pollens, mold, humidity, exercise or sports and cold air               GREEN ZONE   Good Control    I feel good    No cough or wheeze    Can work, sleep and play without asthma symptoms       Take your asthma control medicine every day.     1. If exercise triggers your asthma, take your rescue medication    15 minutes before exercise or sports, and    During exercise if you have asthma symptoms  2. Spacer to use with inhaler: If you have a spacer, make sure to use it with your inhaler             YELLOW ZONE Getting Worse  I have ANY of these:    I do not feel good    Cough or wheeze    Chest feels tight    Wake up at night   1. Keep taking your Green Zone medications  2. Start taking your rescue medicine:    every 20 minutes for up to 1 hour. Then every 4 hours for 24-48 hours.  3. If you stay in the Yellow Zone for more than 12-24 hours, contact your doctor.  4. If you do not return to the Green Zone in 12-24 hours or you get worse, start taking your oral steroid medicine if prescribed by your provider.           RED ZONE Medical Alert - Get Help  I have ANY of these:    I feel awful    Medicine is not helping    Breathing getting harder    Trouble walking or talking    Nose opens wide to breathe       1. Take your rescue medicine NOW  2. If your provider has prescribed an oral steroid medicine, start taking it NOW  3. Call your doctor NOW  4. If you are still in the Red Zone after 20 minutes and you have not reached your doctor:    Take your rescue medicine again and    Call 911 or  go to the emergency room right away    See your regular doctor within 2 weeks of an Emergency Room or Urgent Care visit for follow-up treatment.          Annual Reminders:  Meet with Asthma Educator,  Flu Shot in the Fall, consider Pneumonia Vaccination for patients with asthma (aged 19 and older).    Pharmacy: [id number] - [place]                      Asthma Triggers  How To Control Things That Make Your Asthma Worse    Triggers are things that make your asthma worse.  Look at the list below to help you find your triggers and what you can do about them.  You can help prevent asthma flare-ups by staying away from your triggers.      Trigger                                                          What you can do   Cigarette Smoke  Tobacco smoke can make asthma worse. Do not allow smoking in your home, car or around you.  Be sure no one smokes at a child s day care or school.  If you smoke, ask your health care provider for ways to help you quit.  Ask family members to quit too.  Ask your health care provider for a referral to Quit Plan to help you quit smoking, or call 1-285-656-PLAN.     Colds, Flu, Bronchitis  These are common triggers of asthma. Wash your hands often.  Don t touch your eyes, nose or mouth.  Get a flu shot every year.     Dust Mites  These are tiny bugs that live in cloth or carpet. They are too small to see. Wash sheets and blankets in hot water every week.   Encase pillows and mattress in dust mite proof covers.  Avoid having carpet if you can. If you have carpet, vacuum weekly.   Use a dust mask and HEPA vacuum.   Pollen and Outdoor Mold  Some people are allergic to trees, grass, or weed pollen, or molds. Try to keep your windows closed.  Limit time out doors when pollen count is high.   Ask you health care provider about taking medicine during allergy season.     Animal Dander  Some people are allergic to skin flakes, urine or saliva from pets with  fur or feathers. Keep pets with fur or feathers out of your home.    If you can t keep the pet outdoors, then keep the pet out of your bedroom.  Keep the bedroom door closed.  Keep pets off cloth furniture and away from stuffed toys.     Mice, Rats, and Cockroaches  Some people are allergic to the waste from these pests.   Cover food and garbage.  Clean up spills and food crumbs.  Store grease in the refrigerator.   Keep food out of the bedroom.   Indoor Mold  This can be a trigger if your home has high moisture. Fix leaking faucets, pipes, or other sources of water.   Clean moldy surfaces.  Dehumidify basement if it is damp and smelly.   Smoke, Strong Odors, and Sprays  These can reduce air quality. Stay away from strong odors and sprays, such as perfume, powder, hair spray, paints, smoke incense, paint, cleaning products, candles and new carpet.   Exercise or Sports  Some people with asthma have this trigger. Be active!  Ask your doctor about taking medicine before sports or exercise to prevent symptoms.    Warm up for 5-10 minutes before and after sports or exercise.     Other Triggers of Asthma  Cold air:  Cover your nose and mouth with a scarf.  Sometimes laughing or crying can be a trigger.  Some medicines and food can trigger asthma.

## 2019-07-30 NOTE — PATIENT INSTRUCTIONS
Use your inhaler or nebulizer every 4 hours as needed for shortness breath or wheeze    Pay attention to triggers    You will get call from [facility] allergist for appointment    You will need to get lung function testing--will call to schedule    I would like you to take daily zyrtec every day--if you need to take bendryl for breakthrough allergies or hives take every 6 hours until reolves--always continue zyrtec    Avoid driving or operating machinery with star Macedo on your progress    If at any time you get facial swelling or difficulty breathing go to ER

## 2019-08-14 ENCOUNTER — HOSPITAL ENCOUNTER (OUTPATIENT)
Dept: RESPIRATORY THERAPY | Facility: OTHER | Age: 59
Discharge: HOME | End: 2019-08-14
Attending: NURSE PRACTITIONER | Admitting: NURSE PRACTITIONER
Payer: COMMERCIAL

## 2019-08-14 DIAGNOSIS — Z87.2 HISTORY OF ALLERGIC URTICARIA: ICD-10-CM

## 2019-08-14 DIAGNOSIS — J44.1 COPD EXACERBATION (H): ICD-10-CM

## 2019-08-14 PROCEDURE — 94726 PLETHYSMOGRAPHY LUNG VOLUMES: CPT

## 2019-08-14 PROCEDURE — 94060 EVALUATION OF WHEEZING: CPT

## 2019-08-14 PROCEDURE — 94729 DIFFUSING CAPACITY: CPT

## 2019-08-14 PROCEDURE — 94726 PLETHYSMOGRAPHY LUNG VOLUMES: CPT | Mod: 26 | Performed by: INTERNAL MEDICINE

## 2019-08-14 PROCEDURE — 40000275 ZZH STATISTIC RCP TIME EA 10 MIN

## 2019-08-14 PROCEDURE — 25000125 ZZHC RX 250: Performed by: NURSE PRACTITIONER

## 2019-08-14 PROCEDURE — 94729 DIFFUSING CAPACITY: CPT | Mod: 26 | Performed by: INTERNAL MEDICINE

## 2019-08-14 PROCEDURE — 94060 EVALUATION OF WHEEZING: CPT | Mod: 26 | Performed by: INTERNAL MEDICINE

## 2019-08-14 RX ORDER — ALBUTEROL SULFATE 0.83 MG/ML
2.5 SOLUTION RESPIRATORY (INHALATION) ONCE
Status: COMPLETED | OUTPATIENT
Start: 2019-08-14 | End: 2019-08-14

## 2019-08-14 RX ADMIN — ALBUTEROL SULFATE 2.5 MG: 2.5 SOLUTION RESPIRATORY (INHALATION) at 11:07

## 2019-08-30 ENCOUNTER — OFFICE VISIT (OUTPATIENT)
Dept: FAMILY MEDICINE | Facility: OTHER | Age: 59
End: 2019-08-30
Attending: FAMILY MEDICINE
Payer: COMMERCIAL

## 2019-08-30 VITALS
RESPIRATION RATE: 15 BRPM | SYSTOLIC BLOOD PRESSURE: 102 MMHG | HEART RATE: 84 BPM | OXYGEN SATURATION: 94 % | DIASTOLIC BLOOD PRESSURE: 80 MMHG | TEMPERATURE: 97.8 F | BODY MASS INDEX: 32.33 KG/M2 | HEIGHT: 67 IN | WEIGHT: 206 LBS

## 2019-08-30 DIAGNOSIS — J44.1 COPD EXACERBATION (H): Primary | ICD-10-CM

## 2019-08-30 PROCEDURE — 99213 OFFICE O/P EST LOW 20 MIN: CPT | Performed by: FAMILY MEDICINE

## 2019-08-30 RX ORDER — ALBUTEROL SULFATE 0.83 MG/ML
2.5 SOLUTION RESPIRATORY (INHALATION) EVERY 4 HOURS PRN
Qty: 1 BOX | Refills: 3 | Status: SHIPPED | OUTPATIENT
Start: 2019-08-30 | End: 2021-03-01

## 2019-08-30 RX ORDER — PREDNISONE 20 MG/1
TABLET ORAL
Qty: 20 TABLET | Refills: 0 | Status: SHIPPED | OUTPATIENT
Start: 2019-08-30 | End: 2021-03-01

## 2019-08-30 ASSESSMENT — ANXIETY QUESTIONNAIRES
2. NOT BEING ABLE TO STOP OR CONTROL WORRYING: NOT AT ALL
3. WORRYING TOO MUCH ABOUT DIFFERENT THINGS: NOT AT ALL
7. FEELING AFRAID AS IF SOMETHING AWFUL MIGHT HAPPEN: NOT AT ALL
6. BECOMING EASILY ANNOYED OR IRRITABLE: NOT AT ALL
1. FEELING NERVOUS, ANXIOUS, OR ON EDGE: NOT AT ALL
4. TROUBLE RELAXING: NOT AT ALL
5. BEING SO RESTLESS THAT IT IS HARD TO SIT STILL: NOT AT ALL
GAD7 TOTAL SCORE: 0

## 2019-08-30 ASSESSMENT — ASTHMA QUESTIONNAIRES
QUESTION_3 LAST FOUR WEEKS HOW OFTEN DID YOUR ASTHMA SYMPTOMS (WHEEZING, COUGHING, SHORTNESS OF BREATH, CHEST TIGHTNESS OR PAIN) WAKE YOU UP AT NIGHT OR EARLIER THAN USUAL IN THE MORNING: FOUR OR MORE NIGHTS A WEEK
QUESTION_5 LAST FOUR WEEKS HOW WOULD YOU RATE YOUR ASTHMA CONTROL: POORLY CONTROLLED
QUESTION_1 LAST FOUR WEEKS HOW MUCH OF THE TIME DID YOUR ASTHMA KEEP YOU FROM GETTING AS MUCH DONE AT WORK, SCHOOL OR AT HOME: MOST OF THE TIME
QUESTION_4 LAST FOUR WEEKS HOW OFTEN HAVE YOU USED YOUR RESCUE INHALER OR NEBULIZER MEDICATION (SUCH AS ALBUTEROL): THREE OR MORE TIMES PER DAY
ACT_TOTALSCORE: 7
QUESTION_2 LAST FOUR WEEKS HOW OFTEN HAVE YOU HAD SHORTNESS OF BREATH: MORE THAN ONCE A DAY

## 2019-08-30 ASSESSMENT — PAIN SCALES - GENERAL: PAINLEVEL: SEVERE PAIN (6)

## 2019-08-30 ASSESSMENT — PATIENT HEALTH QUESTIONNAIRE - PHQ9: SUM OF ALL RESPONSES TO PHQ QUESTIONS 1-9: 0

## 2019-08-30 ASSESSMENT — MIFFLIN-ST. JEOR: SCORE: 1703.04

## 2019-08-30 NOTE — PROGRESS NOTES
SUBJECTIVE:   Uriel Monsalve is a 58 year old male who presents to clinic today for the following health issues: Follow-up shortness of breath    Patient arrives here for follow-up shortness of breath.  He was recently diagnosed with asthma.  He was a smoker but quit about 6 months ago when he retired.  He did undergo spirometry which was mild to moderate obstructive disease.  But reports he was feeling well at the time.  He had been on prednisone.  He has got more short of breath and wheezing since his prednisone is stopped.  He is on an albuterol neb at home and has an inhaler that he carries with him.        Patient Active Problem List    Diagnosis Date Noted     Reactive airway disease with acute exacerbation, unspecified asthma severity, unspecified whether persistent 07/30/2019     Priority: Medium     History of tobacco use 07/30/2019     Priority: Medium     Mucopurulent chronic bronchitis (H) 02/07/2019     Priority: Medium     Psychosexual dysfunction with inhibited sexual excitement 02/01/2018     Priority: Medium     Diverticulosis of sigmoid colon 01/25/2016     Priority: Medium     H/O adenomatous polyp of colon 01/25/2016     Priority: Medium     Hypercholesterolemia 01/12/2016     Priority: Medium     Hives 01/11/2016     Priority: Medium     History reviewed. No pertinent past medical history.   Past Surgical History:   Procedure Laterality Date     COLONOSCOPY  01/25/2016    Serrated adenoma  ,F/U 2021     ENDOSCOPIC SINUS SURGERY      No Comments Provided     EXTRACTION(S) DENTAL      No Comments Provided     OTHER SURGICAL HISTORY      [id number],OTHER     Social History     Social History Narrative    . 3 children and 3 step children  [employer]     Current Outpatient Medications   Medication Sig Dispense Refill     albuterol (PROAIR RESPICLICK) 108 (90 Base) MCG/ACT inhaler Inhale 2 puffs into the lungs every 4 hours as needed for shortness of breath / dyspnea or wheezing 1 each  "3     albuterol (PROVENTIL) (2.5 MG/3ML) 0.083% neb solution Take 1 vial (2.5 mg) by nebulization every 4 hours as needed for shortness of breath / dyspnea or wheezing 1 Box 3     fluticasone-salmeterol (ADVAIR) 250-50 MCG/DOSE inhaler Inhale 1 puff into the lungs every 12 hours 3 Inhaler 3     predniSONE (DELTASONE) 20 MG tablet Take 3 tabs by mouth daily x 3 days, then 2 tabs daily x 3 days, then 1 tab daily x 3 days, then 1/2 tab daily x 3 days. 20 tablet 0     Ascorbic Acid (VITAMIN C PO) Take 500 mg by mouth daily       calcium polycarbophil (FIBERCON) 625 MG tablet Take 2 tablets by mouth daily       cetirizine (ZYRTEC) 10 MG tablet Take 1 tablet (10 mg) by mouth daily 90 tablet 1     EPINEPHrine (EPIPEN/ADRENACLICK/OR ANY BX GENERIC EQUIV) 0.3 MG/0.3ML injection 2-pack Inject 0.3 mLs (0.3 mg) into the muscle as needed (sob) 0.3 mL 1     Multiple Vitamin (MULTI-VITAMINS) TABS Take 1 tablet by mouth daily       Omega-3 Fatty Acids (FISH OIL) 500 MG CAPS Take 1 capsule by mouth daily       order for DME Equipment being ordered: Nebulizer 1 Product 1     saccharomyces boulardii (FLORASTOR) 250 MG capsule Take 250 mg by mouth 2 times daily       spacer (OPTICHAMBER CE) holding chamber Use with inhaler 1 each 0     Allergies   Allergen Reactions     Seasonal        Review of Systems     OBJECTIVE:     /80 (BP Location: Right arm, Patient Position: Sitting, Cuff Size: Adult Large)   Pulse 84   Temp 97.8  F (36.6  C)   Resp 15   Ht 1.702 m (5' 7\")   Wt 93.4 kg (206 lb)   SpO2 94%   BMI 32.26 kg/m    Body mass index is 32.26 kg/m .  Physical Exam   Constitutional: He appears well-developed and well-nourished.   Eyes: Pupils are equal, round, and reactive to light.   Pulmonary/Chest: Effort normal.   Expiratory wheezes present   Skin: Skin is warm.       Diagnostic Test Results:  none     ASSESSMENT/PLAN:         1. COPD exacerbation (H)  Really not under good control.  Will add Advair.  Patient is " understands that if he is using the neb or the inhaler more than 3-4 times a week he needs to return.  - albuterol (PROAIR RESPICLICK) 108 (90 Base) MCG/ACT inhaler; Inhale 2 puffs into the lungs every 4 hours as needed for shortness of breath / dyspnea or wheezing  Dispense: 1 each; Refill: 3  - albuterol (PROVENTIL) (2.5 MG/3ML) 0.083% neb solution; Take 1 vial (2.5 mg) by nebulization every 4 hours as needed for shortness of breath / dyspnea or wheezing  Dispense: 1 Box; Refill: 3  - fluticasone-salmeterol (ADVAIR) 250-50 MCG/DOSE inhaler; Inhale 1 puff into the lungs every 12 hours  Dispense: 3 Inhaler; Refill: 3  - predniSONE (DELTASONE) 20 MG tablet; Take 3 tabs by mouth daily x 3 days, then 2 tabs daily x 3 days, then 1 tab daily x 3 days, then 1/2 tab daily x 3 days.  Dispense: 20 tablet; Refill: 0      Fernando Macedo MD  [facility]

## 2019-08-30 NOTE — NURSING NOTE
Patient presents to the clinic for asthma.    Medication Reconciliation Completed.    Mario Herzog LPN  8/30/2019 8:48 AM

## 2019-08-31 ASSESSMENT — ANXIETY QUESTIONNAIRES: GAD7 TOTAL SCORE: 0

## 2019-08-31 ASSESSMENT — ASTHMA QUESTIONNAIRES: ACT_TOTALSCORE: 7

## 2019-11-13 ENCOUNTER — ALLIED HEALTH/NURSE VISIT (OUTPATIENT)
Dept: FAMILY MEDICINE | Facility: OTHER | Age: 59
End: 2019-11-13
Payer: COMMERCIAL

## 2019-11-13 DIAGNOSIS — Z23 NEED FOR INFLUENZA VACCINATION: Primary | ICD-10-CM

## 2019-11-13 PROCEDURE — 90686 IIV4 VACC NO PRSV 0.5 ML IM: CPT

## 2019-11-13 PROCEDURE — 90471 IMMUNIZATION ADMIN: CPT

## 2020-10-08 ENCOUNTER — ALLIED HEALTH/NURSE VISIT (OUTPATIENT)
Dept: FAMILY MEDICINE | Facility: OTHER | Age: 60
End: 2020-10-08
Attending: PEDIATRICS
Payer: COMMERCIAL

## 2020-10-08 DIAGNOSIS — Z23 NEED FOR PROPHYLACTIC VACCINATION AND INOCULATION AGAINST INFLUENZA: Primary | ICD-10-CM

## 2020-10-08 PROCEDURE — 90686 IIV4 VACC NO PRSV 0.5 ML IM: CPT

## 2020-10-08 PROCEDURE — 90471 IMMUNIZATION ADMIN: CPT

## 2020-10-08 NOTE — NURSING NOTE
Immunization Documentation    Prior to Immunization administration, verified patients identity using patient's name and date of birth. Please see IMMUNIZATIONS  and order for additional information.  Patient / Parent instructed to remain in clinic for 15 minutes and report any adverse reaction to staff immediately.    Was entire vial of medication used? Yes  Vial/Syringe: Syringe    Belle Blake LPN  10/8/2020   10:39 AM

## 2020-10-13 DIAGNOSIS — J44.1 COPD EXACERBATION (H): ICD-10-CM

## 2020-10-13 DIAGNOSIS — J44.1 COPD EXACERBATION (H): Primary | ICD-10-CM

## 2020-10-13 RX ORDER — ALBUTEROL SULFATE 90 UG/1
POWDER, METERED RESPIRATORY (INHALATION)
Qty: 1 INHALER | Refills: 3 | Status: SHIPPED | OUTPATIENT
Start: 2020-10-13 | End: 2021-03-01

## 2020-10-13 NOTE — LETTER
October 14, 2020      Uriel Monsalve  [address] [place] [address]        Dear Uriel,     This letter is to remind you that you are over-due for your annual exam with Fernando Macedo. Your last comprehensive visit was more than 12 months ago.    Please call the clinic at [phone] to schedule your appointment.    Thank you for choosing [facility] and [facility] for your health care needs.    Sincerely,    Refill HAKEEM  [facility]

## 2020-10-13 NOTE — TELEPHONE ENCOUNTER
"Requested Prescriptions   Pending Prescriptions Disp Refills     PROAIR RESPICLICK 108 (90 Base) MCG/ACT inhaler [Pharmacy Med Name: ProAir RespiClick 108 (90 Base) MCG/ACT Inhalation Aerosol Powder Breath Activated]  0     Sig: INHALE 2 PUFFS BY MOUTH EVERY 4 HOURS       Asthma Maintenance Inhalers - Anticholinergics Passed - 10/13/2020 12:33 PM        Passed - Patient is age 12 years or older        Passed - Recent (12 mo) or future (30 days) visit within the authorizing provider's specialty     Patient has had an office visit with the authorizing provider or a provider within the authorizing providers department within the previous 12 mos or has a future within next 30 days. See \"Patient Info\" tab in inbasket, or \"Choose Columns\" in Meds & Orders section of the refill encounter.              Passed - Medication is active on med list       Short-Acting Beta Agonist Inhalers Protocol  Passed - 10/13/2020 12:33 PM        Passed - Patient is age 12 or older        Passed - Recent (12 mo) or future (30 days) visit within the authorizing provider's specialty     Patient has had an office visit with the authorizing provider or a provider within the authorizing providers department within the previous 12 mos or has a future within next 30 days. See \"Patient Info\" tab in inbasket, or \"Choose Columns\" in Meds & Orders section of the refill encounter.              Passed - Medication is active on med list           lov 08/30/2019  Routing refill request to provider for review/approval because:  Patient needs to be seen because it has been more than 1 year since last office visit.        "

## 2020-10-14 NOTE — TELEPHONE ENCOUNTER
[id number] sent Rx request for the following:      Requested Prescriptions   Pending Prescriptions Disp Refills   fluticasone-salmeterol (ADVAIR) 250-50 MCG/DOSE inhaler [Pharmacy Med Name: Fluticasone-Salmeterol 250-50 MCG/DOSE Inhalation Aerosol Powder Breath Activated]  0    Sig: INHALE 1 DOSE BY MOUTH EVERY 12 HOURS   Last Prescription Date:   8/30/19  Last Fill Qty/Refills:         3 Inhaler, R-3    Last Office Visit:              8/30/19  Future Office visit:           None  Routing refill request to provider for review/approval because:   Long-Acting Beta Agonist Inhalers Protocol  Failed - 10/14/2020  9:49 AM       Failed - Order for Serevent, Striverdi, or Foradil and pt has steroid inhaler     Patient is overdue for annual exam. Unable to reach Pt. Reminder letter sent. Routing to PCP, for refill consideration.     Unable to complete prescription refill per RN Medication Refill Policy. Geri Nixon RN .............. 10/14/2020  9:58 AM

## 2020-11-24 ENCOUNTER — ALLIED HEALTH/NURSE VISIT (OUTPATIENT)
Dept: FAMILY MEDICINE | Facility: OTHER | Age: 60
End: 2020-11-24
Attending: FAMILY MEDICINE
Payer: COMMERCIAL

## 2020-11-24 DIAGNOSIS — R53.83 FATIGUE: Primary | ICD-10-CM

## 2020-11-24 DIAGNOSIS — R53.83 FATIGUE, UNSPECIFIED TYPE: ICD-10-CM

## 2020-11-24 PROCEDURE — 99207 PR NO CHARGE NURSE ONLY: CPT

## 2020-11-24 PROCEDURE — U0003 INFECTIOUS AGENT DETECTION BY NUCLEIC ACID (DNA OR RNA); SEVERE ACUTE RESPIRATORY SYNDROME CORONAVIRUS 2 (SARS-COV-2) (CORONAVIRUS DISEASE [COVID-19]), AMPLIFIED PROBE TECHNIQUE, MAKING USE OF HIGH THROUGHPUT TECHNOLOGIES AS DESCRIBED BY CMS-2020-01-R: HCPCS | Mod: ZL | Performed by: FAMILY MEDICINE

## 2020-11-24 PROCEDURE — C9803 HOPD COVID-19 SPEC COLLECT: HCPCS

## 2020-11-24 NOTE — NURSING NOTE
Chief Complaint   Patient presents with     Covid 19 Testing     fatigue, exposure     Patient swabbed for COVID-19 testing.  Mario Wong LPN on 11/24/2020 at 9:26 AM

## 2020-11-24 NOTE — LETTER
[facility]  [address]  [place] [address]  [phone]      2020      Uriel Monsalve  [address]   [place] [address]          Dear Uriel Monsalve,    Thank you for your interest in becoming a [employer] user.     Please access the [employer] web site at [email or web address].     Your access code is: [email or web address]  Expires: 2021  9:19 AM    If you allow your access code to , or if you have any questions please call the [employer] representative at your primary clinic during normal clinic hours.     Sincerely,  [facility]

## 2020-11-25 LAB
SARS-COV-2 RNA SPEC QL NAA+PROBE: ABNORMAL
SPECIMEN SOURCE: ABNORMAL

## 2021-01-03 ENCOUNTER — HEALTH MAINTENANCE LETTER (OUTPATIENT)
Age: 61
End: 2021-01-03

## 2021-03-01 ENCOUNTER — OFFICE VISIT (OUTPATIENT)
Dept: FAMILY MEDICINE | Facility: OTHER | Age: 61
End: 2021-03-01
Attending: FAMILY MEDICINE
Payer: COMMERCIAL

## 2021-03-01 VITALS
DIASTOLIC BLOOD PRESSURE: 60 MMHG | TEMPERATURE: 98.4 F | BODY MASS INDEX: 31.98 KG/M2 | WEIGHT: 204.2 LBS | SYSTOLIC BLOOD PRESSURE: 100 MMHG | HEART RATE: 79 BPM | RESPIRATION RATE: 16 BRPM | OXYGEN SATURATION: 96 %

## 2021-03-01 DIAGNOSIS — J44.1 COPD EXACERBATION (H): ICD-10-CM

## 2021-03-01 DIAGNOSIS — Z86.0101 H/O ADENOMATOUS POLYP OF COLON: Primary | ICD-10-CM

## 2021-03-01 LAB
ALBUMIN SERPL-MCNC: 4.3 G/DL (ref 3.5–5.7)
ALP SERPL-CCNC: 80 U/L (ref 34–104)
ALT SERPL W P-5'-P-CCNC: 14 U/L (ref 7–52)
ANION GAP SERPL CALCULATED.3IONS-SCNC: 7 MMOL/L (ref 3–14)
AST SERPL W P-5'-P-CCNC: 13 U/L (ref 13–39)
BILIRUB SERPL-MCNC: 0.5 MG/DL (ref 0.3–1)
BUN SERPL-MCNC: 17 MG/DL (ref 7–25)
CALCIUM SERPL-MCNC: 9.3 MG/DL (ref 8.6–10.3)
CHLORIDE SERPL-SCNC: 102 MMOL/L (ref 98–107)
CHOLEST SERPL-MCNC: 228 MG/DL
CO2 SERPL-SCNC: 27 MMOL/L (ref 21–31)
CREAT SERPL-MCNC: 0.86 MG/DL (ref 0.7–1.3)
GFR SERPL CREATININE-BSD FRML MDRD: >90 ML/MIN/{1.73_M2}
GLUCOSE SERPL-MCNC: 120 MG/DL (ref 70–105)
HDLC SERPL-MCNC: 43 MG/DL (ref 23–92)
LDLC SERPL CALC-MCNC: 160 MG/DL
NONHDLC SERPL-MCNC: 185 MG/DL
POTASSIUM SERPL-SCNC: 4.4 MMOL/L (ref 3.5–5.1)
PROT SERPL-MCNC: 6.9 G/DL (ref 6.4–8.9)
SODIUM SERPL-SCNC: 136 MMOL/L (ref 134–144)
TRIGL SERPL-MCNC: 124 MG/DL

## 2021-03-01 PROCEDURE — 80053 COMPREHEN METABOLIC PANEL: CPT | Mod: ZL | Performed by: FAMILY MEDICINE

## 2021-03-01 PROCEDURE — 80061 LIPID PANEL: CPT | Mod: ZL | Performed by: FAMILY MEDICINE

## 2021-03-01 PROCEDURE — 99214 OFFICE O/P EST MOD 30 MIN: CPT | Performed by: FAMILY MEDICINE

## 2021-03-01 PROCEDURE — 36415 COLL VENOUS BLD VENIPUNCTURE: CPT | Mod: ZL | Performed by: FAMILY MEDICINE

## 2021-03-01 RX ORDER — ALBUTEROL SULFATE 90 UG/1
POWDER, METERED RESPIRATORY (INHALATION)
Qty: 1 EACH | Refills: 11 | Status: SHIPPED | OUTPATIENT
Start: 2021-03-01 | End: 2021-12-21

## 2021-03-01 RX ORDER — ALBUTEROL SULFATE 0.83 MG/ML
2.5 SOLUTION RESPIRATORY (INHALATION) EVERY 4 HOURS PRN
Qty: 36 ML | Refills: 4 | Status: SHIPPED | OUTPATIENT
Start: 2021-03-01 | End: 2021-11-01

## 2021-03-01 ASSESSMENT — ANXIETY QUESTIONNAIRES
6. BECOMING EASILY ANNOYED OR IRRITABLE: NOT AT ALL
7. FEELING AFRAID AS IF SOMETHING AWFUL MIGHT HAPPEN: NOT AT ALL
GAD7 TOTAL SCORE: 0
5. BEING SO RESTLESS THAT IT IS HARD TO SIT STILL: NOT AT ALL
3. WORRYING TOO MUCH ABOUT DIFFERENT THINGS: NOT AT ALL
1. FEELING NERVOUS, ANXIOUS, OR ON EDGE: NOT AT ALL
2. NOT BEING ABLE TO STOP OR CONTROL WORRYING: NOT AT ALL

## 2021-03-01 ASSESSMENT — PATIENT HEALTH QUESTIONNAIRE - PHQ9
SUM OF ALL RESPONSES TO PHQ QUESTIONS 1-9: 0
5. POOR APPETITE OR OVEREATING: NOT AT ALL

## 2021-03-01 ASSESSMENT — PAIN SCALES - GENERAL: PAINLEVEL: NO PAIN (0)

## 2021-03-01 NOTE — PROGRESS NOTES
SUBJECTIVE:   Uriel Monsalve is a 60 year old male who presents to clinic today for the following health issues: Medication follow-up review the chart shows he is need a colonoscopy    Patient arrives here for refill of his respiratory medications.  He has a diagnosis of COPD secondary to tobacco abuse.  He is on inhalers and reports they are doing very well.  Has not had any acute exacerbations requiring ER or hospital.  Review of the chart also shows he is in need of a colonoscopy.  The does have a history of adenomatous polyps.  He will be going to [place] and plans really not making the appointment until probably sometime in April.  Patient has not had labs recently including lipid panel.  He declines a PSA.        Patient Active Problem List    Diagnosis Date Noted     Reactive airway disease with acute exacerbation, unspecified asthma severity, unspecified whether persistent 07/30/2019     Priority: Medium     History of tobacco use 07/30/2019     Priority: Medium     Mucopurulent chronic bronchitis (H) 02/07/2019     Priority: Medium     Psychosexual dysfunction with inhibited sexual excitement 02/01/2018     Priority: Medium     Diverticulosis of sigmoid colon 01/25/2016     Priority: Medium     H/O adenomatous polyp of colon 01/25/2016     Priority: Medium     Hypercholesterolemia 01/12/2016     Priority: Medium     Hives 01/11/2016     Priority: Medium     No past medical history on file.   Allergies   Allergen Reactions     Seasonal        Review of Systems     OBJECTIVE:     /60   Pulse 79   Temp 98.4  F (36.9  C)   Resp 16   Wt 92.6 kg (204 lb 3.2 oz)   SpO2 96%   BMI 31.98 kg/m    Body mass index is 31.98 kg/m .  Physical Exam  Constitutional:       Appearance: Normal appearance.   Cardiovascular:      Rate and Rhythm: Normal rate and regular rhythm.   Pulmonary:      Effort: Pulmonary effort is normal.      Breath sounds: Normal breath sounds.   Neurological:      Mental Status: He is  alert.   Psychiatric:         Mood and Affect: Mood normal.         Diagnostic Test Results:  none     ASSESSMENT/PLAN:         1. COPD exacerbation (H)  Refill  - fluticasone-salmeterol (ADVAIR) 250-50 MCG/DOSE inhaler; INHALE 1 DOSE BY MOUTH EVERY 12 HOURS  Dispense: 3 each; Refill: 4  - albuterol (PROAIR RESPICLICK) 108 (90 Base) MCG/ACT inhaler; INHALE 2 PUFFS BY MOUTH EVERY 4 HOURS  Dispense: 1 each; Refill: 11  - albuterol (PROVENTIL) (2.5 MG/3ML) 0.083% neb solution; Take 1 vial (2.5 mg) by nebulization every 4 hours as needed for shortness of breath / dyspnea or wheezing  Dispense: 36 mL; Refill: 4    2. H/O adenomatous polyp of colon  Labs pending.  We will get back to him when they return.  - GASTROENTEROLOGY ADULT REF PROCEDURE ONLY; Future  - Lipid Panel; Future  - Comprehensive Metabolic Panel; Future  - Comprehensive Metabolic Panel  - Lipid Panel      Fernando Macedo MD  [facility]

## 2021-03-01 NOTE — NURSING NOTE
Patient here for medication review and refills. Medication Reconciliation: complete.    Reina Jordan LPN  3/1/2021 10:33 AM

## 2021-03-02 ASSESSMENT — ASTHMA QUESTIONNAIRES: ACT_TOTALSCORE: 17

## 2021-03-02 ASSESSMENT — ANXIETY QUESTIONNAIRES: GAD7 TOTAL SCORE: 0

## 2021-04-06 ENCOUNTER — IMMUNIZATION (OUTPATIENT)
Dept: FAMILY MEDICINE | Facility: OTHER | Age: 61
End: 2021-04-06
Attending: FAMILY MEDICINE
Payer: COMMERCIAL

## 2021-04-06 PROCEDURE — 91303 PR COVID VAC JANSSEN AD26 0.5ML: CPT

## 2021-04-06 PROCEDURE — 0031A PR COVID VAC JANSSEN AD26 0.5ML: CPT

## 2021-06-02 ENCOUNTER — OFFICE VISIT (OUTPATIENT)
Dept: FAMILY MEDICINE | Facility: OTHER | Age: 61
End: 2021-06-02
Attending: FAMILY MEDICINE
Payer: COMMERCIAL

## 2021-06-02 VITALS
TEMPERATURE: 98.1 F | BODY MASS INDEX: 28.92 KG/M2 | OXYGEN SATURATION: 96 % | RESPIRATION RATE: 16 BRPM | HEART RATE: 65 BPM | WEIGHT: 206.6 LBS | DIASTOLIC BLOOD PRESSURE: 80 MMHG | HEIGHT: 71 IN | SYSTOLIC BLOOD PRESSURE: 128 MMHG

## 2021-06-02 DIAGNOSIS — J98.4 PNEUMONITIS: Primary | ICD-10-CM

## 2021-06-02 PROCEDURE — 99213 OFFICE O/P EST LOW 20 MIN: CPT | Performed by: FAMILY MEDICINE

## 2021-06-02 RX ORDER — AZITHROMYCIN 250 MG/1
TABLET, FILM COATED ORAL
Qty: 6 TABLET | Refills: 0 | Status: SHIPPED | OUTPATIENT
Start: 2021-06-02 | End: 2021-06-07

## 2021-06-02 ASSESSMENT — ANXIETY QUESTIONNAIRES
6. BECOMING EASILY ANNOYED OR IRRITABLE: NOT AT ALL
1. FEELING NERVOUS, ANXIOUS, OR ON EDGE: NOT AT ALL
GAD7 TOTAL SCORE: 0
2. NOT BEING ABLE TO STOP OR CONTROL WORRYING: NOT AT ALL
5. BEING SO RESTLESS THAT IT IS HARD TO SIT STILL: NOT AT ALL
3. WORRYING TOO MUCH ABOUT DIFFERENT THINGS: NOT AT ALL
7. FEELING AFRAID AS IF SOMETHING AWFUL MIGHT HAPPEN: NOT AT ALL

## 2021-06-02 ASSESSMENT — PAIN SCALES - GENERAL: PAINLEVEL: NO PAIN (0)

## 2021-06-02 ASSESSMENT — MIFFLIN-ST. JEOR: SCORE: 1751.32

## 2021-06-02 ASSESSMENT — PATIENT HEALTH QUESTIONNAIRE - PHQ9
5. POOR APPETITE OR OVEREATING: NOT AT ALL
SUM OF ALL RESPONSES TO PHQ QUESTIONS 1-9: 0

## 2021-06-02 NOTE — PROGRESS NOTES
"  SUBJECTIVE:   Uriel Monsalve is a 60 year old male who presents to clinic today for the following health issues: Bilateral ear pain cough congestion    Patient arrives here for bilateral ear pain cough congestion.  He reports there is quite a bit of drainage going back to his throat.  He is coughing up.  Is also reporting both ears hurting.  No fevers or chills.        Patient Active Problem List    Diagnosis Date Noted     Reactive airway disease with acute exacerbation, unspecified asthma severity, unspecified whether persistent 07/30/2019     Priority: Medium     History of tobacco use 07/30/2019     Priority: Medium     Mucopurulent chronic bronchitis (H) 02/07/2019     Priority: Medium     Psychosexual dysfunction with inhibited sexual excitement 02/01/2018     Priority: Medium     Diverticulosis of sigmoid colon 01/25/2016     Priority: Medium     H/O adenomatous polyp of colon 01/25/2016     Priority: Medium     Hypercholesterolemia 01/12/2016     Priority: Medium     Hives 01/11/2016     Priority: Medium     No past medical history on file.   Allergies   Allergen Reactions     Seasonal        Review of Systems     OBJECTIVE:     /80   Pulse 65   Temp 98.1  F (36.7  C)   Resp 16   Ht 1.791 m (5' 10.5\")   Wt 93.7 kg (206 lb 9.6 oz)   SpO2 96%   BMI 29.23 kg/m    Body mass index is 29.23 kg/m .  Physical Exam  Constitutional:       Appearance: Normal appearance.   HENT:      Ears:      Comments: Both TMs are red and slightly bulging.  Pulmonary:      Breath sounds: Rhonchi present.   Neurological:      Mental Status: He is alert.             ASSESSMENT/PLAN:         1. Pneumonitis versus bronchitis/bilateral otitis media  Start if no improvement follow-up with a chest x-ray.  - azithromycin (ZITHROMAX) 250 MG tablet; Take 2 tablets (500 mg) by mouth daily for 1 day, THEN 1 tablet (250 mg) daily for 4 days.  Dispense: 6 tablet; Refill: 0      Fernando Macedo MD  [facility]  "

## 2021-06-03 ASSESSMENT — ANXIETY QUESTIONNAIRES: GAD7 TOTAL SCORE: 0

## 2021-06-08 ENCOUNTER — TELEPHONE (OUTPATIENT)
Dept: FAMILY MEDICINE | Facility: OTHER | Age: 61
End: 2021-06-08

## 2021-06-08 DIAGNOSIS — R05.9 COUGH: Primary | ICD-10-CM

## 2021-06-08 RX ORDER — PREDNISONE 20 MG/1
TABLET ORAL
Qty: 20 TABLET | Refills: 0 | Status: SHIPPED | OUTPATIENT
Start: 2021-06-08 | End: 2021-08-01

## 2021-06-08 NOTE — TELEPHONE ENCOUNTER
Patient calling about his cough. He coughs so much he can not catch his breath. This happened a few years ago and a course of prednisone helped the cough. Is it possible to call in a prescription to [facility]. Reina Jordan LPN .......................6/8/2021  7:48 AM

## 2021-06-22 ENCOUNTER — HOSPITAL ENCOUNTER (OUTPATIENT)
Facility: OTHER | Age: 61
End: 2021-06-22
Attending: SURGERY | Admitting: SURGERY
Payer: COMMERCIAL

## 2021-07-28 RX ORDER — LIDOCAINE 40 MG/G
CREAM TOPICAL
Status: CANCELLED | OUTPATIENT
Start: 2021-07-28

## 2021-07-28 RX ORDER — NALOXONE HYDROCHLORIDE 0.4 MG/ML
0.4 INJECTION, SOLUTION INTRAMUSCULAR; INTRAVENOUS; SUBCUTANEOUS
Status: CANCELLED | OUTPATIENT
Start: 2021-07-28

## 2021-07-28 RX ORDER — NALOXONE HYDROCHLORIDE 0.4 MG/ML
0.2 INJECTION, SOLUTION INTRAMUSCULAR; INTRAVENOUS; SUBCUTANEOUS
Status: CANCELLED | OUTPATIENT
Start: 2021-07-28

## 2021-07-28 RX ORDER — FLUMAZENIL 0.1 MG/ML
0.2 INJECTION, SOLUTION INTRAVENOUS
Status: CANCELLED | OUTPATIENT
Start: 2021-07-28 | End: 2021-07-29

## 2021-07-28 RX ORDER — SODIUM CHLORIDE, SODIUM LACTATE, POTASSIUM CHLORIDE, CALCIUM CHLORIDE 600; 310; 30; 20 MG/100ML; MG/100ML; MG/100ML; MG/100ML
INJECTION, SOLUTION INTRAVENOUS CONTINUOUS
Status: CANCELLED | OUTPATIENT
Start: 2021-07-28

## 2021-07-28 RX ORDER — ONDANSETRON 2 MG/ML
4 INJECTION INTRAMUSCULAR; INTRAVENOUS
Status: CANCELLED | OUTPATIENT
Start: 2021-07-28

## 2021-08-01 ENCOUNTER — OFFICE VISIT (OUTPATIENT)
Dept: FAMILY MEDICINE | Facility: OTHER | Age: 61
End: 2021-08-01
Attending: FAMILY MEDICINE
Payer: COMMERCIAL

## 2021-08-01 VITALS
RESPIRATION RATE: 20 BRPM | OXYGEN SATURATION: 94 % | HEART RATE: 64 BPM | WEIGHT: 203.8 LBS | BODY MASS INDEX: 28.83 KG/M2 | DIASTOLIC BLOOD PRESSURE: 84 MMHG | TEMPERATURE: 97.4 F | SYSTOLIC BLOOD PRESSURE: 128 MMHG

## 2021-08-01 DIAGNOSIS — J45.41 MODERATE PERSISTENT REACTIVE AIRWAY DISEASE WITH WHEEZING WITH ACUTE EXACERBATION: Primary | ICD-10-CM

## 2021-08-01 DIAGNOSIS — H66.001 RIGHT ACUTE SUPPURATIVE OTITIS MEDIA: ICD-10-CM

## 2021-08-01 PROCEDURE — 250N000009 HC RX 250: Performed by: NURSE PRACTITIONER

## 2021-08-01 PROCEDURE — 94640 AIRWAY INHALATION TREATMENT: CPT | Performed by: NURSE PRACTITIONER

## 2021-08-01 PROCEDURE — 99214 OFFICE O/P EST MOD 30 MIN: CPT | Performed by: NURSE PRACTITIONER

## 2021-08-01 RX ORDER — PREDNISONE 20 MG/1
TABLET ORAL
Qty: 20 TABLET | Refills: 0 | Status: SHIPPED | OUTPATIENT
Start: 2021-08-01 | End: 2021-09-08

## 2021-08-01 RX ORDER — IPRATROPIUM BROMIDE AND ALBUTEROL SULFATE 2.5; .5 MG/3ML; MG/3ML
1 SOLUTION RESPIRATORY (INHALATION) EVERY 6 HOURS PRN
Qty: 90 ML | Refills: 0 | Status: SHIPPED | OUTPATIENT
Start: 2021-08-01 | End: 2021-11-01

## 2021-08-01 RX ORDER — IPRATROPIUM BROMIDE AND ALBUTEROL SULFATE 2.5; .5 MG/3ML; MG/3ML
3 SOLUTION RESPIRATORY (INHALATION) ONCE
Status: COMPLETED | OUTPATIENT
Start: 2021-08-01 | End: 2021-08-01

## 2021-08-01 RX ADMIN — IPRATROPIUM BROMIDE AND ALBUTEROL SULFATE 3 ML: 2.5; .5 SOLUTION RESPIRATORY (INHALATION) at 10:36

## 2021-08-01 ASSESSMENT — PAIN SCALES - GENERAL: PAINLEVEL: SEVERE PAIN (6)
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
## 2021-08-01 NOTE — NURSING NOTE
"Chief Complaint   Patient presents with     Cough     Cough and shortness of breath has been going on for 2-3 days. He states that he was doing something in the garage that he should have had a mask on but he did not. He did take a nebulizer prior to showing up.     Initial There were no vitals taken for this visit. Estimated body mass index is 29.23 kg/m  as calculated from the following:    Height as of 6/2/21: 1.791 m (5' 10.5\").    Weight as of 6/2/21: 93.7 kg (206 lb 9.6 oz).     FOOD SECURITY SCREENING QUESTIONS  Hunger Vital Signs:  Within the past 12 months we worried whether our food would run out before we got money to buy more. Never  Within the past 12 months the food we bought just didn't last and we didn't have money to get more. Never      Medication Reconciliation: Complete      Jerome Ayala LPN   "

## 2021-08-01 NOTE — PATIENT INSTRUCTIONS
Discontinue Albuterol nebulizer and switch to Duonebs     Do not combine albuterol inhaler with nebulizers    Take prednisone with food as prescribed    Take antibiotic with food as prescribed    Continue to take Advair inhaler twice daily    Follow up if any worsening or concerns

## 2021-08-06 ENCOUNTER — OFFICE VISIT (OUTPATIENT)
Dept: SURGERY | Facility: OTHER | Age: 61
End: 2021-08-06
Payer: COMMERCIAL

## 2021-08-06 ENCOUNTER — HOSPITAL ENCOUNTER (EMERGENCY)
Facility: OTHER | Age: 61
Discharge: STILL ADMITTED OR ADMINISTRATIVE | End: 2021-08-06
Payer: COMMERCIAL

## 2021-08-06 VITALS — SYSTOLIC BLOOD PRESSURE: 137 MMHG | TEMPERATURE: 97.4 F | HEART RATE: 87 BPM | DIASTOLIC BLOOD PRESSURE: 85 MMHG

## 2021-08-06 VITALS
RESPIRATION RATE: 16 BRPM | SYSTOLIC BLOOD PRESSURE: 137 MMHG | TEMPERATURE: 97.4 F | HEIGHT: 70 IN | HEART RATE: 87 BPM | DIASTOLIC BLOOD PRESSURE: 85 MMHG | OXYGEN SATURATION: 94 % | WEIGHT: 205 LBS | BODY MASS INDEX: 29.35 KG/M2

## 2021-08-06 DIAGNOSIS — S69.92XA INJURY TO FINGERNAIL OF LEFT HAND, INITIAL ENCOUNTER: Primary | ICD-10-CM

## 2021-08-06 PROCEDURE — 99213 OFFICE O/P EST LOW 20 MIN: CPT | Performed by: SURGERY

## 2021-08-06 RX ORDER — CEPHALEXIN 500 MG/1
500 CAPSULE ORAL 2 TIMES DAILY
Qty: 20 CAPSULE | Refills: 0 | Status: SHIPPED | OUTPATIENT
Start: 2021-08-06 | End: 2021-08-16

## 2021-08-06 ASSESSMENT — MIFFLIN-ST. JEOR: SCORE: 1736.12

## 2021-08-06 NOTE — PROGRESS NOTES
Procedure Note      Pre/Post Operative Diagnosis:   Nail through the nailbed of a left 3rd finger       Procedure:   Removal of  Nail through the nailbed of a left 3rd finger      Surgeon: Rojelio Malik MD    Local Anesthesia: 1% lidocaine with 0.25% Marcaine with epinephrine    Indication for the procedure:  This is a 60 year old male  patient referred by Feranndo Macedo with a  Nail through the nailbed of a left 3rd finger .       After explaining the risks to include bleeding, infection, recurrence or need for reexcision, and scarring the patient wished to proceed.    Procedure:   The area was prepped and draped in usual sterile fashion with ChloraPrep.   After, adequate local anesthesia, the nail was removed. A sterile dressing was placed.     Plan:  Keflex for presumed bone involvement.       Rojelio Malik MD....................  8/6/2021   3:47 PM       Min Max   Temp 97.4  F (36.3  C) 97.4  F (36.3  C)   Pulse 87 87   Resp 16 16   BP: Systolic 137 137   BP: Diastolic 85 85   SpO2 94 %

## 2021-08-06 NOTE — ED TRIAGE NOTES
ED Nursing Triage Note (General)   ________________________________    Uriel Monsalve is a 60 year old Male that presents to triage private car  With history of  Nail from nail gun in 3rd digit of left hand.  Pt states injury happened about 30 minutes PTA.   reported by patient   Significant symptoms had onset 30 minute(s) ago.    Patient appears alert , in no acute distress., and cooperative behavior.    GCS Total = 15  Airway: intact  Breathing noted as Normal  Circulation Normal  Skin:  Normal  Action taken:  Triage order initiated      PRE HOSPITAL PRIOR LIVING SITUATION Spouse

## 2021-09-08 DIAGNOSIS — Z86.0101 H/O ADENOMATOUS POLYP OF COLON: Primary | ICD-10-CM

## 2021-09-08 RX ORDER — BISACODYL 5 MG/1
TABLET, DELAYED RELEASE ORAL
Qty: 2 TABLET | Refills: 0 | Status: ON HOLD | OUTPATIENT
Start: 2021-09-08 | End: 2021-09-13

## 2021-09-08 NOTE — TELEPHONE ENCOUNTER
Bisacodyl pended for colonoscopy prep.  Belle Blake LPN........................9/8/2021  9:11 AM

## 2021-09-09 ENCOUNTER — ALLIED HEALTH/NURSE VISIT (OUTPATIENT)
Dept: FAMILY MEDICINE | Facility: OTHER | Age: 61
End: 2021-09-09
Attending: SURGERY
Payer: COMMERCIAL

## 2021-09-09 DIAGNOSIS — Z20.822 COVID-19 RULED OUT: Primary | ICD-10-CM

## 2021-09-09 PROCEDURE — U0005 INFEC AGEN DETEC AMPLI PROBE: HCPCS | Mod: ZL

## 2021-09-09 PROCEDURE — C9803 HOPD COVID-19 SPEC COLLECT: HCPCS

## 2021-09-10 LAB — SARS-COV-2 RNA RESP QL NAA+PROBE: NEGATIVE

## 2021-09-13 ENCOUNTER — ANESTHESIA (OUTPATIENT)
Dept: SURGERY | Facility: OTHER | Age: 61
End: 2021-09-13
Payer: COMMERCIAL

## 2021-09-13 ENCOUNTER — ANESTHESIA EVENT (OUTPATIENT)
Dept: SURGERY | Facility: OTHER | Age: 61
End: 2021-09-13
Payer: COMMERCIAL

## 2021-09-13 ENCOUNTER — HOSPITAL ENCOUNTER (OUTPATIENT)
Facility: OTHER | Age: 61
Discharge: HOME | End: 2021-09-13
Attending: SURGERY | Admitting: SURGERY
Payer: COMMERCIAL

## 2021-09-13 VITALS
OXYGEN SATURATION: 94 % | WEIGHT: 205 LBS | BODY MASS INDEX: 29.35 KG/M2 | DIASTOLIC BLOOD PRESSURE: 77 MMHG | SYSTOLIC BLOOD PRESSURE: 115 MMHG | HEART RATE: 59 BPM | HEIGHT: 70 IN | RESPIRATION RATE: 12 BRPM | TEMPERATURE: 96.7 F

## 2021-09-13 PROBLEM — K63.5 COLON POLYPS: Status: ACTIVE | Noted: 2021-09-13

## 2021-09-13 PROBLEM — Z80.0 FH: COLON CANCER: Status: ACTIVE | Noted: 2021-09-13

## 2021-09-13 PROCEDURE — 45384 COLONOSCOPY W/LESION REMOVAL: CPT | Performed by: NURSE ANESTHETIST, CERTIFIED REGISTERED

## 2021-09-13 PROCEDURE — 88305 TISSUE EXAM BY PATHOLOGIST: CPT

## 2021-09-13 PROCEDURE — 258N000003 HC RX IP 258 OP 636: Performed by: SURGERY

## 2021-09-13 PROCEDURE — 999N000010 HC STATISTIC ANES STAT CODE-CRNA PER MINUTE: Performed by: SURGERY

## 2021-09-13 PROCEDURE — 250N000009 HC RX 250: Performed by: NURSE ANESTHETIST, CERTIFIED REGISTERED

## 2021-09-13 PROCEDURE — 45380 COLONOSCOPY AND BIOPSY: CPT | Performed by: SURGERY

## 2021-09-13 PROCEDURE — 45384 COLONOSCOPY W/LESION REMOVAL: CPT | Mod: PT | Performed by: SURGERY

## 2021-09-13 PROCEDURE — 250N000011 HC RX IP 250 OP 636: Performed by: NURSE ANESTHETIST, CERTIFIED REGISTERED

## 2021-09-13 RX ORDER — ONDANSETRON 2 MG/ML
4 INJECTION INTRAMUSCULAR; INTRAVENOUS
Status: DISCONTINUED | OUTPATIENT
Start: 2021-09-13 | End: 2021-09-13 | Stop reason: HOSPADM

## 2021-09-13 RX ORDER — LIDOCAINE 40 MG/G
CREAM TOPICAL
Status: DISCONTINUED | OUTPATIENT
Start: 2021-09-13 | End: 2021-09-13 | Stop reason: HOSPADM

## 2021-09-13 RX ORDER — PROPOFOL 10 MG/ML
INJECTION, EMULSION INTRAVENOUS PRN
Status: DISCONTINUED | OUTPATIENT
Start: 2021-09-13 | End: 2021-09-13

## 2021-09-13 RX ORDER — NALOXONE HYDROCHLORIDE 0.4 MG/ML
0.4 INJECTION, SOLUTION INTRAMUSCULAR; INTRAVENOUS; SUBCUTANEOUS
Status: DISCONTINUED | OUTPATIENT
Start: 2021-09-13 | End: 2021-09-13 | Stop reason: HOSPADM

## 2021-09-13 RX ORDER — FLUMAZENIL 0.1 MG/ML
0.2 INJECTION, SOLUTION INTRAVENOUS
Status: DISCONTINUED | OUTPATIENT
Start: 2021-09-13 | End: 2021-09-13 | Stop reason: HOSPADM

## 2021-09-13 RX ORDER — NALOXONE HYDROCHLORIDE 0.4 MG/ML
0.2 INJECTION, SOLUTION INTRAMUSCULAR; INTRAVENOUS; SUBCUTANEOUS
Status: DISCONTINUED | OUTPATIENT
Start: 2021-09-13 | End: 2021-09-13 | Stop reason: HOSPADM

## 2021-09-13 RX ORDER — SODIUM CHLORIDE, SODIUM LACTATE, POTASSIUM CHLORIDE, CALCIUM CHLORIDE 600; 310; 30; 20 MG/100ML; MG/100ML; MG/100ML; MG/100ML
INJECTION, SOLUTION INTRAVENOUS CONTINUOUS
Status: DISCONTINUED | OUTPATIENT
Start: 2021-09-13 | End: 2021-09-13 | Stop reason: HOSPADM

## 2021-09-13 RX ORDER — PROPOFOL 10 MG/ML
INJECTION, EMULSION INTRAVENOUS CONTINUOUS PRN
Status: DISCONTINUED | OUTPATIENT
Start: 2021-09-13 | End: 2021-09-13

## 2021-09-13 RX ORDER — LIDOCAINE HYDROCHLORIDE 20 MG/ML
INJECTION, SOLUTION INFILTRATION; PERINEURAL PRN
Status: DISCONTINUED | OUTPATIENT
Start: 2021-09-13 | End: 2021-09-13

## 2021-09-13 RX ADMIN — PROPOFOL 100 MG: 10 INJECTION, EMULSION INTRAVENOUS at 10:13

## 2021-09-13 RX ADMIN — LIDOCAINE HYDROCHLORIDE 40 MG: 20 INJECTION, SOLUTION INFILTRATION; PERINEURAL at 10:13

## 2021-09-13 RX ADMIN — SODIUM CHLORIDE, SODIUM LACTATE, POTASSIUM CHLORIDE, AND CALCIUM CHLORIDE: 600; 310; 30; 20 INJECTION, SOLUTION INTRAVENOUS at 10:10

## 2021-09-13 RX ADMIN — PROPOFOL 175 MCG/KG/MIN: 10 INJECTION, EMULSION INTRAVENOUS at 10:14

## 2021-09-13 ASSESSMENT — MIFFLIN-ST. JEOR: SCORE: 1736.12

## 2021-09-13 ASSESSMENT — LIFESTYLE VARIABLES: TOBACCO_USE: 1

## 2021-09-13 NOTE — H&P
History and Physical    CHIEF COMPLAINT / REASON FOR PROCEDURE:  H/o polyps    PERTINENT HISTORY   Patient is a 60 year old male who presents today for colonoscopy . Last colonoscopy 2016.   Patient has no complaints.    History reviewed. No pertinent past medical history.  Past Surgical History:   Procedure Laterality Date     COLONOSCOPY  01/25/2016    Serrated adenoma  ,F/U 2021     ENDOSCOPIC SINUS SURGERY      No Comments Provided     EXTRACTION(S) DENTAL      No Comments Provided       Bleeding tendencies:  No    ALLERGIES/SENSITIVITIES:   Allergies   Allergen Reactions     Seasonal         CURRENT MEDICATIONS:    Prior to Admission medications    Medication Sig Start Date End Date Taking? Authorizing Provider   albuterol (PROAIR RESPICLICK) 108 (90 Base) MCG/ACT inhaler INHALE 2 PUFFS BY MOUTH EVERY 4 HOURS 3/1/21  Yes Fernando Macedo MD   albuterol (PROVENTIL) (2.5 MG/3ML) 0.083% neb solution Take 1 vial (2.5 mg) by nebulization every 4 hours as needed for shortness of breath / dyspnea or wheezing 3/1/21  Yes Fernando Macedo MD   calcium polycarbophil (FIBERCON) 625 MG tablet Take 2 tablets by mouth daily   Yes Reported, Patient   cholecalciferol (VITAMIN D3) 25 mcg (1000 units) capsule Take 1 capsule by mouth daily   Yes Reported, Patient   fluticasone-salmeterol (ADVAIR) 250-50 MCG/DOSE inhaler INHALE 1 DOSE BY MOUTH EVERY 12 HOURS 3/1/21  Yes Fernando Macedo MD   ipratropium - albuterol 0.5 mg/2.5 mg/3 mL (DUONEB) 0.5-2.5 (3) MG/3ML neb solution Take 1 vial (3 mLs) by nebulization every 6 hours as needed for shortness of breath / dyspnea or wheezing 8/1/21  Yes Nissa Franco NP   Multiple Vitamin (MULTI-VITAMINS) TABS Take 1 tablet by mouth daily 9/24/16  Yes Reported, Patient   order for DME Equipment being ordered: Nebulizer 7/30/19  Yes Shereen Chan APRN CNP   spacer (OPTICHAMBER CE) holding chamber Use with inhaler 7/30/19  Yes Shereen Chan APRN CNP   Ascorbic Acid (VITAMIN C PO)  "Take 500 mg by mouth daily    Reported, Patient       Physical Exam:  /86   Temp (!) 96.5  F (35.8  C) (Tympanic)   Ht 1.778 m (5' 10\")   Wt 93 kg (205 lb)   SpO2 95%   BMI 29.41 kg/m    EXAM:  Chest/Respiratory Exam: Normal - Clear to auscultation without rales, rhonchi, or wheezing.  Cardiovascular Exam: normal, regular rate and rhythm      PLAN: COLONOSCOPY .  Patient understands risks of bleeding, perforation, potential inability to reach cecum, aspiration and wishes to proceed.    "

## 2021-09-13 NOTE — ANESTHESIA CARE TRANSFER NOTE
Patient: Uriel Monsalve    Procedure(s):  COLONOSCOPY, WITH POLYPECTOMY    Diagnosis: Screening for colon cancer [Z12.11]  Diagnosis Additional Information: No value filed.    Anesthesia Type:   MAC     Note:    Oropharynx: oropharynx clear of all foreign objects and spontaneously breathing  Level of Consciousness: drowsy  Oxygen Supplementation: nasal cannula  Level of Supplemental Oxygen (L/min / FiO2): 2  Independent Airway: airway patency satisfactory and stable  Dentition: dentition unchanged  Vital Signs Stable: post-procedure vital signs reviewed and stable  Report to RN Given: handoff report given  Patient transferred to: Phase II    Handoff Report: Identifed the Patient, Identified the Reponsible Provider, Reviewed the pertinent medical history, Discussed the surgical course, Reviewed Intra-OP anesthesia mangement and issues during anesthesia, Set expectations for post-procedure period and Allowed opportunity for questions and acknowledgement of understanding      Vitals:  Vitals Value Taken Time   BP 88/57 09/13/21 1044   Temp     Pulse 74 09/13/21 1044   Resp     SpO2 94 % 09/13/21 1048   Vitals shown include unvalidated device data.    Electronically Signed By: SARAH Clark CRNA  September 13, 2021  10:49 AM

## 2021-09-13 NOTE — ANESTHESIA PREPROCEDURE EVALUATION
Anesthesia Pre-Procedure Evaluation    Patient: Uriel Monsalve   MRN: [id number] : 1960        Preoperative Diagnosis: Screening for colon cancer [Z12.11]   Procedure : Procedure(s):  COLONOSCOPY     History reviewed. No pertinent past medical history.   Past Surgical History:   Procedure Laterality Date     COLONOSCOPY  2016    Serrated adenoma  ,F/U      ENDOSCOPIC SINUS SURGERY      No Comments Provided     EXTRACTION(S) DENTAL      No Comments Provided      Allergies   Allergen Reactions     Seasonal       Social History     Tobacco Use     Smoking status: Former Smoker     Packs/day: 1.00     Years: 35.00     Pack years: 35.00     Types: Cigarettes     Quit date: 2018     Years since quittin.8     Smokeless tobacco: Never Used   Substance Use Topics     Alcohol use: No     Alcohol/week: 0.0 standard drinks      Wt Readings from Last 1 Encounters:   21 93 kg (205 lb)        Anesthesia Evaluation   Pt has had prior anesthetic.         ROS/MED HX  ENT/Pulmonary: Comment: Quit smoking     (+) tobacco use, Past use, Moderate Persistent, asthma Treatment: Inhaler prn and Inhaler daily,      Neurologic:  - neg neurologic ROS     Cardiovascular:     (+) Dyslipidemia -----    METS/Exercise Tolerance: >4 METS    Hematologic:  - neg hematologic  ROS     Musculoskeletal:  - neg musculoskeletal ROS     GI/Hepatic:     (+) bowel prep,     Renal/Genitourinary:  - neg Renal ROS     Endo:  - neg endo ROS     Psychiatric/Substance Use:  - neg psychiatric ROS     Infectious Disease:  - neg infectious disease ROS     Malignancy:  - neg malignancy ROS     Other:  - neg other ROS          Physical Exam    Airway        Mallampati: III   TM distance: > 3 FB   Neck ROM: full   Mouth opening: > 3 cm    Respiratory Devices and Support         Dental  no notable dental history         Cardiovascular   cardiovascular exam normal          Pulmonary   pulmonary exam normal                OUTSIDE  LABS:  CBC: No results found for: WBC, HGB, HCT, PLT  BMP:   Lab Results   Component Value Date     03/01/2021     01/11/2016    POTASSIUM 4.4 03/01/2021    POTASSIUM 4.2 01/11/2016    CHLORIDE 102 03/01/2021    CHLORIDE 103 01/11/2016    CO2 27 03/01/2021    CO2 28 01/11/2016    BUN 17 03/01/2021    BUN 14 01/11/2016    CR 0.86 03/01/2021    CR 0.87 01/11/2016     (H) 03/01/2021     01/11/2016     COAGS: No results found for: PTT, INR, FIBR  POC: No results found for: BGM, HCG, HCGS  HEPATIC:   Lab Results   Component Value Date    ALBUMIN 4.3 03/01/2021    PROTTOTAL 6.9 03/01/2021    ALT 14 03/01/2021    AST 13 03/01/2021    ALKPHOS 80 03/01/2021    BILITOTAL 0.5 03/01/2021     OTHER:   Lab Results   Component Value Date    NAOMI 9.3 03/01/2021       Anesthesia Plan    ASA Status:  2   NPO Status:  NPO Appropriate    Anesthesia Type: MAC.              Consents    Anesthesia Plan(s) and associated risks, benefits, and realistic alternatives discussed. Questions answered and patient/representative(s) expressed understanding.     - Discussed with:  Patient      - Extended Intubation/Ventilatory Support Discussed: No.      - Patient is DNR/DNI Status: No    Use of blood products discussed: No .     Postoperative Care            Comments:                SARAH AGUAYO CRNA

## 2021-09-13 NOTE — ANESTHESIA POSTPROCEDURE EVALUATION
Patient: Uriel Monsalve    Procedure(s):  COLONOSCOPY, WITH POLYPECTOMY    Diagnosis:Screening for colon cancer [Z12.11]  Diagnosis Additional Information: No value filed.    Anesthesia Type:  MAC    Note:  Disposition: Outpatient   Postop Pain Control: Uneventful            Sign Out: Well controlled pain   PONV: No   Neuro/Psych: Uneventful            Sign Out: Acceptable/Baseline neuro status   Airway/Respiratory: Uneventful            Sign Out: Acceptable/Baseline resp. status   CV/Hemodynamics: Uneventful            Sign Out: Acceptable CV status; No obvious hypovolemia; No obvious fluid overload   Other NRE: NONE   DID A NON-ROUTINE EVENT OCCUR? No           Last vitals:  Vitals Value Taken Time   /77 09/13/21 1100   Temp 96.7  F (35.9  C) 09/13/21 1045   Pulse 59 09/13/21 1100   Resp 12 09/13/21 1045   SpO2 94 % 09/13/21 1103   Vitals shown include unvalidated device data.    Electronically Signed By: SARAH Clark CRNA  September 13, 2021  11:04 AM

## 2021-09-13 NOTE — DISCHARGE INSTRUCTIONS
[facility]  Adult Discharge Orders & Instructions    ________________________________________________________________          For 12 hours after surgery  1. Get plenty of rest.  A responsible adult must stay with you for at least 12 hours after you leave the hospital.   2. You may feel lightheaded.  IF so, sit for a few minutes before standing.  Have someone help you get up.   3. You may have a slight fever. Call the doctor if your fever is over 101 F (38.3 C) (taken under the tongue) or lasts longer than 24 hours.  4. You may have a dry mouth, a sore throat, muscle aches or trouble sleeping.  These should go away after 24 hours.  5. Do not make important or legal decisions.  6.   Do not drive or use heavy equipment.  If you have weakness or tingling, don't drive or use heavy equipment until this feeling goes away.    To contact a doctor, call   [phone]_______________________

## 2021-09-13 NOTE — OP NOTE
PROCEDURE NOTE    DATE OF SERVICE: 9/13/2021    SURGEON: Rashid Baum MD    PRE-OP DIAGNOSIS:    History of Polyps  Family history of colon cancer      POST-OP DIAGNOSIS:  Same  Sigmoid Diverticulosis, mild  Polyps at cecum and 20 cm    PROCEDURE:   Colonoscopy with hot biopsy    ANESTHESIA:  ANAID Linder CRNA    INDICATION FOR THE PROCEDURE: The patient is a 60 year old male with h/o polyps . The patient has no other complaints  . After explaining the risks to include bleeding, perforation, potential inability toreach the cecum, the patient wished to proceed.    PROCEDURE:After adequate sedation, the patient was in the left lateral decubitus position.  Rectal exam was performed.  There was normal tone and no palpable masses .  The colonoscope was introduced into the rectum and advanced to the cecum with Mild difficulty.  The patient's prep was fair, as densely adherent bile in right colon would not completely irrigate off .  The terminal cecum was reached.  The cecum, ascending, transverse, descending and sigmoid colon was was with mild sigmoid diverticulosis and diminutive polyps at cecum and at 20 cm that were hot biopsied and destroyed .  The scope was retroflexed in the rectum.  The rectum was unremarkable  .  The scope was straightened and removed.  The patient tolerated the procedure well.     ESTIMATED BLOOD LOSS: none    COMPLICATIONS:  None    TISSUE REMOVED:  Yes    RECOMMEND:    Follow-up pending pathology  Fiber  Given literature on diverticulosis    Rashid Baum MD FACS

## 2021-09-14 LAB
PATH REPORT.COMMENTS IMP SPEC: NORMAL
PATH REPORT.FINAL DX SPEC: NORMAL
PHOTO IMAGE: NORMAL

## 2021-09-15 PROBLEM — K63.5 COLON POLYPS: Status: RESOLVED | Noted: 2021-09-13 | Resolved: 2021-09-15

## 2021-10-09 ENCOUNTER — HEALTH MAINTENANCE LETTER (OUTPATIENT)
Age: 61
End: 2021-10-09

## 2021-11-01 ENCOUNTER — OFFICE VISIT (OUTPATIENT)
Dept: FAMILY MEDICINE | Facility: OTHER | Age: 61
End: 2021-11-01
Attending: FAMILY MEDICINE
Payer: COMMERCIAL

## 2021-11-01 VITALS
TEMPERATURE: 97.6 F | HEART RATE: 80 BPM | SYSTOLIC BLOOD PRESSURE: 116 MMHG | BODY MASS INDEX: 29.59 KG/M2 | DIASTOLIC BLOOD PRESSURE: 78 MMHG | WEIGHT: 206.2 LBS | RESPIRATION RATE: 24 BRPM | OXYGEN SATURATION: 93 %

## 2021-11-01 DIAGNOSIS — J45.41 MODERATE PERSISTENT REACTIVE AIRWAY DISEASE WITH WHEEZING WITH ACUTE EXACERBATION: ICD-10-CM

## 2021-11-01 DIAGNOSIS — J44.1 COPD EXACERBATION (H): ICD-10-CM

## 2021-11-01 PROCEDURE — 99213 OFFICE O/P EST LOW 20 MIN: CPT | Performed by: FAMILY MEDICINE

## 2021-11-01 RX ORDER — AZITHROMYCIN 250 MG/1
TABLET, FILM COATED ORAL
Qty: 6 TABLET | Refills: 0 | Status: SHIPPED | OUTPATIENT
Start: 2021-11-01 | End: 2021-12-27

## 2021-11-01 RX ORDER — PREDNISONE 20 MG/1
TABLET ORAL
Qty: 20 TABLET | Refills: 0 | Status: SHIPPED | OUTPATIENT
Start: 2021-11-01 | End: 2021-12-27

## 2021-11-01 RX ORDER — IPRATROPIUM BROMIDE AND ALBUTEROL SULFATE 2.5; .5 MG/3ML; MG/3ML
1 SOLUTION RESPIRATORY (INHALATION) EVERY 6 HOURS PRN
Qty: 90 ML | Refills: 4 | Status: SHIPPED | OUTPATIENT
Start: 2021-11-01 | End: 2022-04-22

## 2021-11-01 RX ORDER — ALBUTEROL SULFATE 0.83 MG/ML
2.5 SOLUTION RESPIRATORY (INHALATION) EVERY 4 HOURS PRN
Qty: 36 ML | Refills: 4 | Status: CANCELLED | OUTPATIENT
Start: 2021-11-01

## 2021-11-01 ASSESSMENT — PAIN SCALES - GENERAL: PAINLEVEL: SEVERE PAIN (7)

## 2021-11-01 NOTE — PROGRESS NOTES
SUBJECTIVE:   Uriel Monsalve is a 60 year old male who presents to clinic today for the following health issues: Breathing issues patient arrives here for breathing issues.  He has a history of COPD.  States is very similar    .  He also reports problems with his ears.  Pressure.  Shortness of breath.  And cough.  He has a history of Covid.  Declines any testing.  This is day #4.        Patient Active Problem List    Diagnosis Date Noted     FH: colon cancer 09/13/2021     Priority: Medium     Reactive airway disease with acute exacerbation, unspecified asthma severity, unspecified whether persistent 07/30/2019     Priority: Medium     History of tobacco use 07/30/2019     Priority: Medium     Mucopurulent chronic bronchitis (H) 02/07/2019     Priority: Medium     Psychosexual dysfunction with inhibited sexual excitement 02/01/2018     Priority: Medium     Diverticulosis of sigmoid colon 01/25/2016     Priority: Medium     H/O adenomatous polyp of colon 01/25/2016     Priority: Medium     Hypercholesterolemia 01/12/2016     Priority: Medium     Hives 01/11/2016     Priority: Medium     No past medical history on file.     Review of Systems     OBJECTIVE:     /78   Pulse 80   Temp 97.6  F (36.4  C)   Resp 24   Wt 93.5 kg (206 lb 3.2 oz)   SpO2 93%   BMI 29.59 kg/m    Body mass index is 29.59 kg/m .  Physical Exam  Constitutional:       Appearance: Normal appearance.   HENT:      Ears:      Comments: Both TMs are red and injected and bulging  Pulmonary:      Effort: No respiratory distress.   Neurological:      Mental Status: He is alert.         Diagnostic Test Results:  none     ASSESSMENT/PLAN:         (J44.1) COPD exacerbation (H)  Comment: Start  Plan: predniSONE (DELTASONE) 20 MG tablet,         azithromycin (ZITHROMAX) 250 MG tablet            (J45.41) Moderate persistent reactive airway disease with wheezing with acute exacerbation  Comment:   Plan: ipratropium - albuterol 0.5 mg/2.5 mg/3 mL          (DUONEB) 0.5-2.5 (3) MG/3ML neb solution        Also refill.  He is on albuterol neb and I advised him typically we do not do 2 albuterol nebs.  We will discontinue the albuterol neb.        Fernando Macedo MD  [facility]

## 2021-11-01 NOTE — NURSING NOTE
Patient here for breathing problems the past 4 days and he also needs refills on medication for the nebulized medication. He has been having chronic ear infections.  Medication Reconciliation: complete.    Reina Jordan LPN  11/1/2021 3:13 PM

## 2021-12-17 DIAGNOSIS — J44.1 COPD EXACERBATION (H): ICD-10-CM

## 2021-12-21 RX ORDER — ALBUTEROL SULFATE 90 UG/1
POWDER, METERED RESPIRATORY (INHALATION)
Qty: 1 EACH | Refills: 4 | Status: SHIPPED | OUTPATIENT
Start: 2021-12-21 | End: 2022-04-26

## 2021-12-21 NOTE — TELEPHONE ENCOUNTER
"[id number]  sent Rx request for the following:      Requested Prescriptions   Pending Prescriptions Disp Refills     PROAIR RESPICLICK 108 (90 Base) MCG/ACT inhaler [Pharmacy Med Name: ProAir RespiClick 108 (90 Base) MCG/ACT Inhalation Aerosol Powder Breath Activated]  0     Sig: INHALE 2 PUFFS BY MOUTH EVERY 4 HOURS       Asthma Maintenance Inhalers - Anticholinergics Passed - 12/17/2021  3:24 PM        Passed - Patient is age 12 years or older        Passed - Recent (12 mo) or future (30 days) visit within the authorizing provider's specialty     Patient has had an office visit with the authorizing provider or a provider within the authorizing providers department within the previous 12 mos or has a future within next 30 days. See \"Patient Info\" tab in inbasket, or \"Choose Columns\" in Meds & Orders section of the refill encounter.          Passed - Medication is active on med list       Short-Acting Beta Agonist Inhalers Protocol  Passed - 12/17/2021  3:24 PM        Passed - Patient is age 12 or older        Passed - Recent (12 mo) or future (30 days) visit within the authorizing provider's specialty     Patient has had an office visit with the authorizing provider or a provider within the authorizing providers department within the previous 12 mos or has a future within next 30 days. See \"Patient Info\" tab in inbasket, or \"Choose Columns\" in Meds & Orders section of the refill encounter.          Passed - Medication is active on med list     Last Prescription Date:   3/1/21  Last Fill Qty/Refills:         1 each, R-11  Last Office Visit:              11/01/21  Future Office visit:            None  Routing refill request to provider for review/approval because:  Unable to complete prescription refill per RN Medication Refill Policy. Bibi Jordan RN .............. 12/21/2021  9:35 AM  "

## 2021-12-27 ENCOUNTER — OFFICE VISIT (OUTPATIENT)
Dept: FAMILY MEDICINE | Facility: OTHER | Age: 61
End: 2021-12-27
Attending: FAMILY MEDICINE
Payer: COMMERCIAL

## 2021-12-27 VITALS
WEIGHT: 207 LBS | HEART RATE: 83 BPM | BODY MASS INDEX: 29.7 KG/M2 | TEMPERATURE: 97.4 F | OXYGEN SATURATION: 93 % | DIASTOLIC BLOOD PRESSURE: 88 MMHG | RESPIRATION RATE: 20 BRPM | SYSTOLIC BLOOD PRESSURE: 130 MMHG

## 2021-12-27 DIAGNOSIS — J44.1 COPD EXACERBATION (H): ICD-10-CM

## 2021-12-27 PROCEDURE — 99213 OFFICE O/P EST LOW 20 MIN: CPT | Performed by: FAMILY MEDICINE

## 2021-12-27 RX ORDER — PREDNISONE 20 MG/1
TABLET ORAL
Qty: 20 TABLET | Refills: 0 | Status: SHIPPED | OUTPATIENT
Start: 2021-12-27 | End: 2022-02-10

## 2021-12-27 RX ORDER — AZITHROMYCIN 250 MG/1
TABLET, FILM COATED ORAL
Qty: 6 TABLET | Refills: 0 | Status: SHIPPED | OUTPATIENT
Start: 2021-12-27 | End: 2022-01-01

## 2021-12-27 ASSESSMENT — PAIN SCALES - GENERAL: PAINLEVEL: NO PAIN (0)

## 2021-12-27 NOTE — NURSING NOTE
"Chief Complaint   Patient presents with     Cough     for 5 days   He has had a cough, fatigue and shortness of breath for 5 days.  Etta Venegas LPN..................12/27/2021   1:56 PM      Initial /88   Pulse 83   Temp 97.4  F (36.3  C) (Temporal)   Resp 20   Wt 93.9 kg (207 lb)   SpO2 93%   BMI 29.70 kg/m   Estimated body mass index is 29.7 kg/m  as calculated from the following:    Height as of 9/13/21: 1.778 m (5' 10\").    Weight as of this encounter: 93.9 kg (207 lb).  Medication Reconciliation: complete    FOOD SECURITY SCREENING QUESTIONS  Hunger Vital Signs:  Within the past 12 months we worried whether our food would run out before we got money to buy more. Never  Within the past 12 months the food we bought just didn't last and we didn't have money to get more. Never    Etta Venegas, KAIT  "

## 2021-12-27 NOTE — PROGRESS NOTES
"Nursing Notes:   Etta Venegas LPN  12/27/2021  2:17 PM  Signed  Chief Complaint   Patient presents with     Cough     for 5 days   He has had a cough, fatigue and shortness of breath for 5 days.  Etta Venegas LPN..................12/27/2021   1:56 PM      Initial /88   Pulse 83   Temp 97.4  F (36.3  C) (Temporal)   Resp 20   Wt 93.9 kg (207 lb)   SpO2 93%   BMI 29.70 kg/m   Estimated body mass index is 29.7 kg/m  as calculated from the following:    Height as of 9/13/21: 1.778 m (5' 10\").    Weight as of this encounter: 93.9 kg (207 lb).  Medication Reconciliation: complete    FOOD SECURITY SCREENING QUESTIONS  Hunger Vital Signs:  Within the past 12 months we worried whether our food would run out before we got money to buy more. Never  Within the past 12 months the food we bought just didn't last and we didn't have money to get more. Never    Etta Venegas LPN      SUBJECTIVE:  Uriel Monsalve  is a 61 year old male who comes in today because of cough fatigue and shortness of breath for the last 5 days.  No fever or chills.  He has known COPD.  Last had an exacerbation of same on November 1 and got Zithromax and prednisone.  He has a productive cough that is thick and more than usual.  No fever.  He has fatigue and is tired since his sleep has been disrupted.      He had [employer] vaccine in April and then had Moderna booster on November 18. He had Covid in November 2020.    Past Medical, Family, and Social History reviewed and updated as noted below.   ROS is negative except as noted above       Allergies   Allergen Reactions     Seasonal    ,   Family History   Problem Relation Age of Onset     Cancer Father         Cancer     Diabetes Other         Diabetes,Family history     Cancer Brother         Cancer,neck     Cancer Brother         Cancer,throat   ,   Current Outpatient Medications   Medication     Ascorbic Acid (VITAMIN C PO)     azithromycin (ZITHROMAX) 250 MG tablet     " calcium polycarbophil (FIBERCON) 625 MG tablet     cholecalciferol (VITAMIN D3) 25 mcg (1000 units) capsule     fluticasone-salmeterol (ADVAIR) 250-50 MCG/DOSE inhaler     ipratropium - albuterol 0.5 mg/2.5 mg/3 mL (DUONEB) 0.5-2.5 (3) MG/3ML neb solution     Multiple Vitamin (MULTI-VITAMINS) TABS     order for DME     predniSONE (DELTASONE) 20 MG tablet     PROAIR RESPICLICK 108 (90 Base) MCG/ACT inhaler     spacer ([facility]) holding chamber     No current facility-administered medications for this visit.   , History reviewed. No pertinent past medical history.,   Patient Active Problem List    Diagnosis Date Noted     FH: colon cancer 09/13/2021     Priority: Medium     Reactive airway disease with acute exacerbation, unspecified asthma severity, unspecified whether persistent 07/30/2019     Priority: Medium     History of tobacco use 07/30/2019     Priority: Medium     Mucopurulent chronic bronchitis (H) 02/07/2019     Priority: Medium     Psychosexual dysfunction with inhibited sexual excitement 02/01/2018     Priority: Medium     Diverticulosis of sigmoid colon 01/25/2016     Priority: Medium     H/O adenomatous polyp of colon 01/25/2016     Priority: Medium     Hypercholesterolemia 01/12/2016     Priority: Medium     Hives 01/11/2016     Priority: Medium   ,   Past Surgical History:   Procedure Laterality Date     COLONOSCOPY  01/25/2016    Serrated adenoma  ,F/U 2021     COLONOSCOPY N/A 09/13/2021    F/U 2026 FH colon cancer. Hyperplastic and Sigmoid diverticulosis.     ENDOSCOPIC SINUS SURGERY      No Comments Provided     EXTRACTION(S) DENTAL      No Comments Provided    and   Social History     Tobacco Use     Smoking status: Former Smoker     Packs/day: 1.00     Years: 35.00     Pack years: 35.00     Types: Cigarettes     Quit date: 11/2/2018     Years since quitting: 3.1     Smokeless tobacco: Never Used   Substance Use Topics     Alcohol use: No     Alcohol/week: 0.0 standard drinks      OBJECTIVE:  /88   Pulse 83   Temp 97.4  F (36.3  C) (Temporal)   Resp 20   Wt 93.9 kg (207 lb)   SpO2 93%   BMI 29.70 kg/m     EXAM:  Alert cooperative, no distress.  Nose is clear, TMs are clear.  Throat is clear.  Neck is supple without significant adenopathy.  He has scattered wheezes without coarse breath sounds.  No rales are audible.  Air movement is fairly good.  Cardiac RRR without murmur  ASSESSMENT/Plan :    Uriel was seen today for cough.    Diagnoses and all orders for this visit:    COPD exacerbation (H)  -     predniSONE (DELTASONE) 20 MG tablet; Take 3 tabs by mouth daily x 3 days, then 2 tabs daily x 3 days, then 1 tab daily x 3 days, then 1/2 tab daily x 3 days.  -     azithromycin (ZITHROMAX) 250 MG tablet; Take 2 tablets (500 mg) by mouth daily for 1 day, THEN 1 tablet (250 mg) daily for 4 days.      I think he has exacerbation of COPD/flare of asthma.  Certainly could be RSV but he does not have a fever.  We will treat with Zithromax and a burst of prednisone.  Advise follow-up if worsening or not improving    Mauri Ceballos MD

## 2022-01-01 NOTE — TELEPHONE ENCOUNTER
Patient is requesting refill of albuterol inhaler it was missed at his recent appt. [facility] pharmacy. Reina Jordan LPN .......................2/15/2019  8:36 AM    
Pupils equal, round and reactive to light, Extra-ocular movement intact, eyes are clear b/l

## 2022-01-29 ENCOUNTER — HEALTH MAINTENANCE LETTER (OUTPATIENT)
Age: 62
End: 2022-01-29

## 2022-02-10 ENCOUNTER — OFFICE VISIT (OUTPATIENT)
Dept: FAMILY MEDICINE | Facility: OTHER | Age: 62
End: 2022-02-10
Attending: FAMILY MEDICINE
Payer: COMMERCIAL

## 2022-02-10 ENCOUNTER — HOSPITAL ENCOUNTER (OUTPATIENT)
Dept: GENERAL RADIOLOGY | Facility: OTHER | Age: 62
End: 2022-02-10
Attending: FAMILY MEDICINE
Payer: COMMERCIAL

## 2022-02-10 VITALS
HEART RATE: 79 BPM | OXYGEN SATURATION: 94 % | RESPIRATION RATE: 28 BRPM | BODY MASS INDEX: 31.22 KG/M2 | SYSTOLIC BLOOD PRESSURE: 124 MMHG | TEMPERATURE: 98 F | DIASTOLIC BLOOD PRESSURE: 88 MMHG | WEIGHT: 217.6 LBS

## 2022-02-10 DIAGNOSIS — J44.1 COPD EXACERBATION (H): ICD-10-CM

## 2022-02-10 DIAGNOSIS — J44.1 COPD EXACERBATION (H): Primary | ICD-10-CM

## 2022-02-10 DIAGNOSIS — R05.9 COUGH: ICD-10-CM

## 2022-02-10 PROCEDURE — U0003 INFECTIOUS AGENT DETECTION BY NUCLEIC ACID (DNA OR RNA); SEVERE ACUTE RESPIRATORY SYNDROME CORONAVIRUS 2 (SARS-COV-2) (CORONAVIRUS DISEASE [COVID-19]), AMPLIFIED PROBE TECHNIQUE, MAKING USE OF HIGH THROUGHPUT TECHNOLOGIES AS DESCRIBED BY CMS-2020-01-R: HCPCS | Mod: ZL | Performed by: FAMILY MEDICINE

## 2022-02-10 PROCEDURE — 99213 OFFICE O/P EST LOW 20 MIN: CPT | Performed by: FAMILY MEDICINE

## 2022-02-10 PROCEDURE — C9803 HOPD COVID-19 SPEC COLLECT: HCPCS

## 2022-02-10 PROCEDURE — 71046 X-RAY EXAM CHEST 2 VIEWS: CPT

## 2022-02-10 RX ORDER — AZITHROMYCIN 250 MG/1
TABLET, FILM COATED ORAL
Qty: 6 TABLET | Refills: 0 | Status: SHIPPED | OUTPATIENT
Start: 2022-02-10 | End: 2022-02-15

## 2022-02-10 RX ORDER — ALBUTEROL SULFATE 90 UG/1
2 AEROSOL, METERED RESPIRATORY (INHALATION) EVERY 6 HOURS
Qty: 18 G | Refills: 1 | Status: SHIPPED | OUTPATIENT
Start: 2022-02-10 | End: 2022-12-22

## 2022-02-10 RX ORDER — PREDNISONE 20 MG/1
TABLET ORAL
Qty: 20 TABLET | Refills: 0 | Status: SHIPPED | OUTPATIENT
Start: 2022-02-10 | End: 2022-04-26

## 2022-02-10 ASSESSMENT — PAIN SCALES - GENERAL: PAINLEVEL: NO PAIN (0)

## 2022-02-10 NOTE — PROGRESS NOTES
SUBJECTIVE:   Uriel Monsalve is a 61 year old male who presents to clinic today for the following health issues: Shortness of breath    Patient arrives here for shortness of breath.  Patient states he periodically gets these episodes.  He has been using his nebs with little improvement.  Also is requesting a refill different rescue inhaler.  No fevers chills.  Cough is somewhat dry.  Both ears hurt.  Symptoms have been going on for 2 days    History of Present Illness     Asthma:  He presents for follow up of asthma.  He has some cough, some wheezing, and some shortness of breath. He is using a relief medication 2-3 times per day. He typically misses taking his controller medication 1 time(s) per week.Patient is aware of the following triggers: cold air, exercise or sports, mold, pollens, strong odors and fumes and upper respiratory infections. The patient has not had a visit to the Emergency Room, Urgent Care or Hospital due to asthma since the last clinic visit.     He eats 0-1 servings of fruits and vegetables daily.He consumes 1 sweetened beverage(s) daily.He exercises with enough effort to increase his heart rate 10 to 19 minutes per day.  He exercises with enough effort to increase his heart rate 3 or less days per week.   He is taking medications regularly.        Patient Active Problem List    Diagnosis Date Noted     FH: colon cancer 09/13/2021     Priority: Medium     Reactive airway disease with acute exacerbation, unspecified asthma severity, unspecified whether persistent 07/30/2019     Priority: Medium     History of tobacco use 07/30/2019     Priority: Medium     Mucopurulent chronic bronchitis (H) 02/07/2019     Priority: Medium     Psychosexual dysfunction with inhibited sexual excitement 02/01/2018     Priority: Medium     Diverticulosis of sigmoid colon 01/25/2016     Priority: Medium     H/O adenomatous polyp of colon 01/25/2016     Priority: Medium     Hypercholesterolemia 01/12/2016      Priority: Medium     Hives 01/11/2016     Priority: Medium     No past medical history on file.     Review of Systems     OBJECTIVE:     /88   Pulse 79   Temp 98  F (36.7  C)   Resp 28   Wt 98.7 kg (217 lb 9.6 oz)   SpO2 94%   BMI 31.22 kg/m    Body mass index is 31.22 kg/m .  Physical Exam  Constitutional:       Appearance: Normal appearance.   Pulmonary:      Effort: No respiratory distress.      Breath sounds: No stridor. Wheezing present. No rhonchi or rales.      Comments: Decreased breath sounds bilateral  Neurological:      Mental Status: He is alert.   Psychiatric:         Mood and Affect: Mood normal.         Diagnostic Test Results:  none     ASSESSMENT/PLAN:         (J44.1) COPD exacerbation (H)  (primary encounter diagnosis)  Comment: Chest x-ray unremarkable.  Plan: XR Chest 2 Views, azithromycin (ZITHROMAX) 250         MG tablet, predniSONE (DELTASONE) 20 MG tablet,        albuterol (PROAIR HFA/PROVENTIL HFA/VENTOLIN         HFA) 108 (90 Base) MCG/ACT inhaler,         Miscellaneous Order for DME - ONLY FOR DME      Start prednisone and Zithromax.  Trial of a different inhaler.  Continue with the new labs.    (R05.9) Cough  Comment:    Plan: Symptomatic; Yes; 2/7/2022 COVID-19 Virus         (Coronavirus) by PCR        Patient is also encouraged to carry his EpiPen with him.  Follow-up if getting worse.  I did discuss whether he felt hospitalization patient stated that he did not feel he needed it        Fernando Macedo MD  [facility] AND [facility]

## 2022-02-10 NOTE — NURSING NOTE
Patient here for SOB for the last 2 days. He would like a rescue inhaler that is aresol. Medication Reconciliation: complete.    Reina Jordan LPN  2/10/2022 8:16 AM

## 2022-02-11 LAB — SARS-COV-2 RNA RESP QL NAA+PROBE: NEGATIVE

## 2022-04-22 DIAGNOSIS — J45.41 MODERATE PERSISTENT REACTIVE AIRWAY DISEASE WITH WHEEZING WITH ACUTE EXACERBATION: ICD-10-CM

## 2022-04-22 DIAGNOSIS — J44.1 COPD EXACERBATION (H): ICD-10-CM

## 2022-04-22 RX ORDER — IPRATROPIUM BROMIDE AND ALBUTEROL SULFATE 2.5; .5 MG/3ML; MG/3ML
1 SOLUTION RESPIRATORY (INHALATION) EVERY 6 HOURS PRN
Qty: 90 ML | Refills: 4 | Status: SHIPPED | OUTPATIENT
Start: 2022-04-22 | End: 2022-04-26

## 2022-04-22 RX ORDER — PREDNISONE 20 MG/1
TABLET ORAL
Qty: 20 TABLET | Refills: 0 | OUTPATIENT
Start: 2022-04-22

## 2022-04-22 NOTE — TELEPHONE ENCOUNTER
Patient stopped by the [facility] check in requesting a refill.    Reason for call: Medication or medication refill    Name of medication requested: Prednizone and Ipratropium Bromide 0.5mg and Albuterol Sulfate 3 mg    Are you out of the medication? Yes , 1 left    What pharmacy do [facility]    Preferred method for responding to this message: Telephone Call    Phone number patient can be reached at: Home number on file [phone] (home)    If we cannot reach you directly, may we leave a detailed response at the number you provided? Yes     This can not wait until his doctor returns.  He needs these filled today.    Belle Pinzon on 4/22/2022 at 10:23 AM

## 2022-04-25 NOTE — TELEPHONE ENCOUNTER
Patient is calling to check on this RX.   Please call back. Thank you   Radha Lazo on 4/25/2022 at 8:22 AM

## 2022-04-25 NOTE — TELEPHONE ENCOUNTER
Called and set up appt for med refill for 4/26 with Dr. Macedo.  He is still wanting to hear from Hellen's nurse because he thinks Dr. Macedo will send in the RX with out being seen as he has never seen Dr. Fan and has no history with her.    Belle Pinzon on 4/25/2022 at 9:35 AM

## 2022-04-26 ENCOUNTER — OFFICE VISIT (OUTPATIENT)
Dept: FAMILY MEDICINE | Facility: OTHER | Age: 62
End: 2022-04-26
Attending: FAMILY MEDICINE
Payer: COMMERCIAL

## 2022-04-26 VITALS
DIASTOLIC BLOOD PRESSURE: 70 MMHG | OXYGEN SATURATION: 94 % | HEART RATE: 74 BPM | SYSTOLIC BLOOD PRESSURE: 114 MMHG | TEMPERATURE: 97.8 F | RESPIRATION RATE: 24 BRPM | WEIGHT: 214.8 LBS | BODY MASS INDEX: 30.82 KG/M2

## 2022-04-26 DIAGNOSIS — J44.1 COPD EXACERBATION (H): ICD-10-CM

## 2022-04-26 DIAGNOSIS — J45.41 MODERATE PERSISTENT REACTIVE AIRWAY DISEASE WITH WHEEZING WITH ACUTE EXACERBATION: ICD-10-CM

## 2022-04-26 PROCEDURE — 99213 OFFICE O/P EST LOW 20 MIN: CPT | Performed by: FAMILY MEDICINE

## 2022-04-26 RX ORDER — DOXYCYCLINE 100 MG/1
100 CAPSULE ORAL 2 TIMES DAILY
Qty: 20 CAPSULE | Refills: 0 | Status: SHIPPED | OUTPATIENT
Start: 2022-04-26 | End: 2022-05-06

## 2022-04-26 RX ORDER — PREDNISONE 20 MG/1
TABLET ORAL
Qty: 20 TABLET | Refills: 3 | Status: SHIPPED | OUTPATIENT
Start: 2022-04-26 | End: 2022-10-06

## 2022-04-26 RX ORDER — IPRATROPIUM BROMIDE AND ALBUTEROL SULFATE 2.5; .5 MG/3ML; MG/3ML
1 SOLUTION RESPIRATORY (INHALATION) EVERY 6 HOURS PRN
Qty: 90 ML | Refills: 4 | Status: SHIPPED | OUTPATIENT
Start: 2022-04-26 | End: 2022-12-22

## 2022-04-26 RX ORDER — ALBUTEROL SULFATE 90 UG/1
1-2 POWDER, METERED RESPIRATORY (INHALATION) EVERY 4 HOURS PRN
Qty: 2 EACH | Refills: 4 | Status: SHIPPED | OUTPATIENT
Start: 2022-04-26 | End: 2022-12-22

## 2022-04-26 RX ORDER — FLUTICASONE PROPIONATE AND SALMETEROL 250; 50 UG/1; UG/1
1 POWDER RESPIRATORY (INHALATION) EVERY 12 HOURS
Qty: 90 EACH | Refills: 4 | Status: SHIPPED | OUTPATIENT
Start: 2022-04-26 | End: 2022-12-22

## 2022-04-26 ASSESSMENT — PAIN SCALES - GENERAL: PAINLEVEL: NO PAIN (0)

## 2022-04-26 NOTE — TELEPHONE ENCOUNTER
Patient has appointment today to address issues. Reina Jordan LPN .......................4/26/2022  8:08 AM

## 2022-04-27 NOTE — PROGRESS NOTES
"  Assessment & Plan     COPD exacerbation (H)  Refill start prednisone and doxycycline follow-up if no improvement consider chest x-ray  - albuterol (PROAIR RESPICLICK) 108 (90 Base) MCG/ACT inhaler; Inhale 1-2 puffs into the lungs every 4 hours as needed for shortness of breath / dyspnea or wheezing  - fluticasone-salmeterol (ADVAIR) 250-50 MCG/DOSE inhaler; Inhale 1 puff into the lungs every 12 hours  - predniSONE (DELTASONE) 20 MG tablet; Take 3 tabs by mouth daily x 3 days, then 2 tabs daily x 3 days, then 1 tab daily x 3 days, then 1/2 tab daily x 3 days.  - doxycycline hyclate (VIBRAMYCIN) 100 MG capsule; Take 1 capsule (100 mg) by mouth 2 times daily for 10 days    Moderate persistent reactive airway disease with wheezing with acute exacerbation    - ipratropium - albuterol 0.5 mg/2.5 mg/3 mL (DUONEB) 0.5-2.5 (3) MG/3ML neb solution; Take 1 vial (3 mLs) by nebulization every 6 hours as needed for shortness of breath / dyspnea or wheezing             BMI:   Estimated body mass index is 30.82 kg/m  as calculated from the following:    Height as of 9/13/21: 1.778 m (5' 10\").    Weight as of this encounter: 97.4 kg (214 lb 12.8 oz).           No follow-ups on file.    Fernando Macedo MD  [facility] AND [facility]    Jean Paul Booth is a 61 year old who presents for the following health issues     S patient arrives here for shortness of breath.  Reports he gets occasional episodes.  In the past he has been on antibiotics and prednisone.  He is using his inhaler on a regular basis but reports it does not seem to be improving.    History of Present Illness     Asthma:  He presents for follow up of asthma.  He has some cough, some wheezing, and some shortness of breath. He is using a relief medication daily. He typically misses taking his controller medication 1 time(s) per week.Patient is aware of the following triggers: same as previous visit, cold air, pollens, strong odors and fumes and upper " respiratory infections. The patient has not had a visit to the Emergency Room, Urgent Care or Hospital due to asthma since the last clinic visit.     COPD:  He presents for follow up of COPD.  Overall, COPD symptoms are stable since last visit. He has more than usual fatigue or shortness of breath with exertion and no shortness of breath at rest.He sometimes coughs and does not have change in sputum. No recent fever. He can walk less than a mile without stopping to rest. He can walk 2 flights of stairs without resting.The patient has had no ED, urgent care, or hospital admissions because of COPD since the last visit.     He eats 0-1 servings of fruits and vegetables daily.He consumes 2 sweetened beverage(s) daily.He exercises with enough effort to increase his heart rate 30 to 60 minutes per day.  He exercises with enough effort to increase his heart rate 7 days per week. He is missing 1 dose(s) of medications per week.             Review of Systems         Objective    /70   Pulse 74   Temp 97.8  F (36.6  C)   Resp 24   Wt 97.4 kg (214 lb 12.8 oz)   SpO2 94%   BMI 30.82 kg/m    Body mass index is 30.82 kg/m .  Physical Exam  Constitutional:       Appearance: Normal appearance.   Pulmonary:      Effort: Pulmonary effort is normal.      Comments: Expiratory wheezes present  Neurological:      Mental Status: He is alert.

## 2022-10-06 ENCOUNTER — TELEPHONE (OUTPATIENT)
Dept: FAMILY MEDICINE | Facility: OTHER | Age: 62
End: 2022-10-06

## 2022-10-06 DIAGNOSIS — J44.1 COPD EXACERBATION (H): ICD-10-CM

## 2022-10-06 RX ORDER — PREDNISONE 20 MG/1
TABLET ORAL
Qty: 20 TABLET | Refills: 3 | Status: SHIPPED | OUTPATIENT
Start: 2022-10-06 | End: 2022-11-27

## 2022-10-06 NOTE — TELEPHONE ENCOUNTER
Requested Prescriptions   Pending Prescriptions Disp Refills     predniSONE (DELTASONE) 20 MG tablet 20 tablet 3     Sig: Take 3 tabs by mouth daily x 3 days, then 2 tabs daily x 3 days, then 1 tab daily x 3 days, then 1/2 tab daily x 3 days.   Last Prescription Date:   4/26/22  Last Fill Qty/Refills:         20, R-3    Last Office Visit:              4/26/22   Future Office visit:           None    PER LOV note:  S patient arrives here for shortness of breath.  Reports he gets occasional episodes.  In the past he has been on antibiotics and prednisone.     COPD exacerbation (H)  Refill start prednisone and doxycycline follow-up if no improvement consider chest x-ray    Geri Nixon RN .............. 10/6/2022  9:40 AM

## 2022-10-06 NOTE — TELEPHONE ENCOUNTER
Reason for call: Medication or medication refill    Name of medication requested: Prednisone    Are you out of the medication? Yes    What pharmacy do you use? [facility]    Preferred method for responding to this message: Telephone Call    Phone number patient can be reached at: Cell number on file:    Telephone Information:   Mobile [phone]       If we cannot reach you directly, may we leave a detailed response at the number you provided? Yes     Marti Arevalo on 10/6/2022 at 8:59 AM

## 2022-10-06 NOTE — TELEPHONE ENCOUNTER
Patient requesting prednisone refill for cough. Started 1 week ago, SOB, increased phlegm and ear pain. [facility] pharmacy. Reina Jordan LPN .......................10/6/2022  11:05 AM

## 2022-10-07 ENCOUNTER — OFFICE VISIT (OUTPATIENT)
Dept: FAMILY MEDICINE | Facility: OTHER | Age: 62
End: 2022-10-07
Attending: FAMILY MEDICINE
Payer: COMMERCIAL

## 2022-10-07 VITALS
WEIGHT: 207.2 LBS | HEART RATE: 89 BPM | BODY MASS INDEX: 29.73 KG/M2 | OXYGEN SATURATION: 94 % | TEMPERATURE: 97.6 F | RESPIRATION RATE: 20 BRPM | SYSTOLIC BLOOD PRESSURE: 130 MMHG | DIASTOLIC BLOOD PRESSURE: 82 MMHG

## 2022-10-07 DIAGNOSIS — J41.1 MUCOPURULENT CHRONIC BRONCHITIS (H): Primary | ICD-10-CM

## 2022-10-07 PROCEDURE — 99213 OFFICE O/P EST LOW 20 MIN: CPT | Performed by: FAMILY MEDICINE

## 2022-10-07 RX ORDER — DOXYCYCLINE 100 MG/1
100 CAPSULE ORAL 2 TIMES DAILY
Qty: 20 CAPSULE | Refills: 0 | Status: SHIPPED | OUTPATIENT
Start: 2022-10-07 | End: 2022-10-17

## 2022-10-07 ASSESSMENT — ASTHMA QUESTIONNAIRES
QUESTION_4 LAST FOUR WEEKS HOW OFTEN HAVE YOU USED YOUR RESCUE INHALER OR NEBULIZER MEDICATION (SUCH AS ALBUTEROL): ONE OR TWO TIMES PER DAY
QUESTION_5 LAST FOUR WEEKS HOW WOULD YOU RATE YOUR ASTHMA CONTROL: POORLY CONTROLLED
ACT_TOTALSCORE: 13
ACT_TOTALSCORE: 13
QUESTION_1 LAST FOUR WEEKS HOW MUCH OF THE TIME DID YOUR ASTHMA KEEP YOU FROM GETTING AS MUCH DONE AT WORK, SCHOOL OR AT HOME: A LITTLE OF THE TIME
QUESTION_2 LAST FOUR WEEKS HOW OFTEN HAVE YOU HAD SHORTNESS OF BREATH: ONCE OR TWICE A WEEK
QUESTION_3 LAST FOUR WEEKS HOW OFTEN DID YOUR ASTHMA SYMPTOMS (WHEEZING, COUGHING, SHORTNESS OF BREATH, CHEST TIGHTNESS OR PAIN) WAKE YOU UP AT NIGHT OR EARLIER THAN USUAL IN THE MORNING: FOUR OR MORE NIGHTS A WEEK

## 2022-10-07 ASSESSMENT — PAIN SCALES - GENERAL: PAINLEVEL: SEVERE PAIN (6)

## 2022-10-07 NOTE — PROGRESS NOTES
Assessment & Plan     (J41.1) Mucopurulent chronic bronchitis (H)  (primary encounter diagnosis)  Comment: has not yet picked up the prednisone which is the primary treatment.  Will add on antibiotics with close follow up, as planned in 1 week with pcp    Plan: doxycycline hyclate (VIBRAMYCIN) 100 MG capsule                            Return in about 1 week (around 10/14/2022).    Donn Lopez MD  [facility]    Subjective   Leobardo is a 61 year old, presenting for the following health issues:  Asthma      History of Present Illness     Asthma:  He presents for follow up of asthma.  He has some cough, some wheezing, and some shortness of breath. He is using a relief medication daily. He typically misses taking his controller medication 1 time(s) per week.Patient is aware of the following triggers: cold air, dust mites, emotions, exercise or sports, gastric reflux, pollens, strong odors and fumes and upper respiratory infections. The patient has not had a visit to the Emergency Room, Urgent Care or Hospital due to asthma since the last clinic visit.     COPD:  He presents for follow up of COPD.  Overall, COPD symptoms are stable since last visit. He has more than usual fatigue or shortness of breath with exertion and no shortness of breath at rest.He rarely coughs and does have change in sputum. Patient has had recent fever. He can walk greater than 2 miles without stopping to rest. He can walk 3 or more flights of stairs without resting.The patient has had no ED, urgent care, or hospital admissions because of COPD since the last visit.     He eats 0-1 servings of fruits and vegetables daily.He consumes 2 sweetened beverage(s) daily.He exercises with enough effort to increase his heart rate 10 to 19 minutes per day.  He exercises with enough effort to increase his heart rate 5 days per week. He is missing 2 dose(s) of medications per week.     About once a month he has severe cough and will take  abs and prednisone for it.  Has some ear pain and sinus pressure currently.  Fevers for the last 2 days. Using albuterol only 1-2 times daily.  His pcp faxed in steroids yesterday.      Current Outpatient Medications   Medication     albuterol (PROAIR HFA/PROVENTIL HFA/VENTOLIN HFA) 108 (90 Base) MCG/ACT inhaler     albuterol (PROAIR RESPICLICK) 108 (90 Base) MCG/ACT inhaler     Ascorbic Acid (VITAMIN C PO)     calcium polycarbophil (FIBERCON) 625 MG tablet     cholecalciferol (VITAMIN D3) 25 mcg (1000 units) capsule     doxycycline hyclate (VIBRAMYCIN) 100 MG capsule     fluticasone-salmeterol (ADVAIR) 250-50 MCG/DOSE inhaler     fluticasone-salmeterol (ADVAIR) 250-50 MCG/DOSE inhaler     ipratropium - albuterol 0.5 mg/2.5 mg/3 mL (DUONEB) 0.5-2.5 (3) MG/3ML neb solution     Multiple Vitamin (MULTI-VITAMINS) TABS     order for DME     predniSONE (DELTASONE) 20 MG tablet     spacer (OPTICHAMBER CE) holding chamber     No current facility-administered medications for this visit.               Review of Systems         Objective    /82   Pulse 89   Temp 97.6  F (36.4  C) (Tympanic)   Resp 20   Wt 94 kg (207 lb 3.2 oz)   SpO2 94%   BMI 29.73 kg/m    Body mass index is 29.73 kg/m .  Physical Exam  Constitutional:       Appearance: Normal appearance.   HENT:      Right Ear: Tympanic membrane normal.      Left Ear: Tympanic membrane normal.      Nose: Congestion present. No rhinorrhea.      Mouth/Throat:      Mouth: Mucous membranes are moist.      Pharynx: No oropharyngeal exudate or posterior oropharyngeal erythema.   Cardiovascular:      Rate and Rhythm: Normal rate and regular rhythm.   Pulmonary:      Comments: Very poor air movement with tight wheezing and a very long expiratory phase   Neurological:      Mental Status: He is alert.                             no

## 2022-10-07 NOTE — NURSING NOTE
"Chief Complaint   Patient presents with     Asthma       Initial /82   Pulse 89   Temp 97.6  F (36.4  C) (Tympanic)   Resp 20   Wt 94 kg (207 lb 3.2 oz)   SpO2 94%   BMI 29.73 kg/m   Estimated body mass index is 29.73 kg/m  as calculated from the following:    Height as of 9/13/21: 1.778 m (5' 10\").    Weight as of this encounter: 94 kg (207 lb 3.2 oz).  Medication Reconciliation: complete    FOOD SECURITY SCREENING QUESTIONS  Hunger Vital Signs:  Within the past 12 months we worried whether our food would run out before we got money to buy more. Never  Within the past 12 months the food we bought just didn't last and we didn't have money to get more. Never  Dianne Higgins LPN 10/7/2022 2:21 PM    Do you have a healthcare directive? No        "

## 2022-11-07 DIAGNOSIS — J41.1 MUCOPURULENT CHRONIC BRONCHITIS (H): Primary | ICD-10-CM

## 2022-11-07 NOTE — PROGRESS NOTES
Pt is presenting to clinic tomorrow for flu and pneumo injection. Pt needs orders on which pneumo he should receive if appropriate. PCP is out, sending to [facility] provider for assistance, thank you.    Neva Schaefer RN on 11/7/2022 at 10:24 AM

## 2022-11-08 ENCOUNTER — ALLIED HEALTH/NURSE VISIT (OUTPATIENT)
Dept: FAMILY MEDICINE | Facility: OTHER | Age: 62
End: 2022-11-08
Attending: FAMILY MEDICINE
Payer: COMMERCIAL

## 2022-11-08 DIAGNOSIS — Z23 NEED FOR VACCINATION: Primary | ICD-10-CM

## 2022-11-08 DIAGNOSIS — Z23 NEED FOR PROPHYLACTIC VACCINATION AND INOCULATION AGAINST INFLUENZA: ICD-10-CM

## 2022-11-08 PROCEDURE — 90677 PCV20 VACCINE IM: CPT

## 2022-11-08 PROCEDURE — 90682 RIV4 VACC RECOMBINANT DNA IM: CPT

## 2022-11-08 PROCEDURE — 90471 IMMUNIZATION ADMIN: CPT

## 2022-11-08 NOTE — PROGRESS NOTES
Immunization Documentation  Verified patient's first and last name, and . Stated reason for visit today is to receive prevnar 20 and flu vaccines. Accompained to clinic visit with self. Denied any concerns with previous immunizations. Allergies reviewed. VIS handout(s) reviewed and given to take home. vaccines prepared and administered per standing order. Administration documented in IMMUNIZATIONS (see flowsheet and order for further information).  Instructed to wait in lobby for 15 minutes post-injection and notify staff immediately of any reaction.     Neva Schaefer RN ....................  2022   1:16 PM

## 2022-11-27 ENCOUNTER — APPOINTMENT (OUTPATIENT)
Dept: GENERAL RADIOLOGY | Facility: OTHER | Age: 62
End: 2022-11-27
Attending: STUDENT IN AN ORGANIZED HEALTH CARE EDUCATION/TRAINING PROGRAM
Payer: COMMERCIAL

## 2022-11-27 ENCOUNTER — HOSPITAL ENCOUNTER (EMERGENCY)
Facility: OTHER | Age: 62
Discharge: HOME | End: 2022-11-27
Attending: STUDENT IN AN ORGANIZED HEALTH CARE EDUCATION/TRAINING PROGRAM | Admitting: STUDENT IN AN ORGANIZED HEALTH CARE EDUCATION/TRAINING PROGRAM
Payer: COMMERCIAL

## 2022-11-27 VITALS
BODY MASS INDEX: 29.63 KG/M2 | OXYGEN SATURATION: 92 % | HEART RATE: 89 BPM | HEIGHT: 70 IN | RESPIRATION RATE: 24 BRPM | DIASTOLIC BLOOD PRESSURE: 92 MMHG | TEMPERATURE: 97.2 F | WEIGHT: 207 LBS | SYSTOLIC BLOOD PRESSURE: 133 MMHG

## 2022-11-27 DIAGNOSIS — J45.901 EXACERBATION OF ASTHMA, UNSPECIFIED ASTHMA SEVERITY, UNSPECIFIED WHETHER PERSISTENT: ICD-10-CM

## 2022-11-27 LAB
ALLEN'S TEST: NO
ANION GAP SERPL CALCULATED.3IONS-SCNC: 10 MMOL/L (ref 7–15)
BASE EXCESS BLDA CALC-SCNC: 2.1 MMOL/L (ref -9–1.8)
BASOPHILS # BLD AUTO: 0.1 10E3/UL (ref 0–0.2)
BASOPHILS NFR BLD AUTO: 1 %
BUN SERPL-MCNC: 12.1 MG/DL (ref 8–23)
CALCIUM SERPL-MCNC: 9.2 MG/DL (ref 8.8–10.2)
CHLORIDE SERPL-SCNC: 104 MMOL/L (ref 98–107)
CREAT SERPL-MCNC: 0.77 MG/DL (ref 0.67–1.17)
DEPRECATED HCO3 PLAS-SCNC: 26 MMOL/L (ref 22–29)
EOSINOPHIL # BLD AUTO: 0.6 10E3/UL (ref 0–0.7)
EOSINOPHIL NFR BLD AUTO: 8 %
ERYTHROCYTE [DISTWIDTH] IN BLOOD BY AUTOMATED COUNT: 13 % (ref 10–15)
FLUAV RNA SPEC QL NAA+PROBE: NEGATIVE
FLUBV RNA RESP QL NAA+PROBE: NEGATIVE
GFR SERPL CREATININE-BSD FRML MDRD: >90 ML/MIN/1.73M2
GLUCOSE SERPL-MCNC: 111 MG/DL (ref 70–99)
HCO3 BLD-SCNC: 27 MMOL/L (ref 21–28)
HCT VFR BLD AUTO: 43.6 % (ref 40–53)
HGB BLD-MCNC: 14.6 G/DL (ref 13.3–17.7)
HOLD SPECIMEN: NORMAL
IMM GRANULOCYTES # BLD: 0 10E3/UL
IMM GRANULOCYTES NFR BLD: 0 %
LYMPHOCYTES # BLD AUTO: 1.6 10E3/UL (ref 0.8–5.3)
LYMPHOCYTES NFR BLD AUTO: 21 %
MAGNESIUM SERPL-MCNC: 1.8 MG/DL (ref 1.7–2.3)
MCH RBC QN AUTO: 32.4 PG (ref 26.5–33)
MCHC RBC AUTO-ENTMCNC: 33.5 G/DL (ref 31.5–36.5)
MCV RBC AUTO: 97 FL (ref 78–100)
MONOCYTES # BLD AUTO: 0.6 10E3/UL (ref 0–1.3)
MONOCYTES NFR BLD AUTO: 7 %
NEUTROPHILS # BLD AUTO: 4.7 10E3/UL (ref 1.6–8.3)
NEUTROPHILS NFR BLD AUTO: 63 %
NRBC # BLD AUTO: 0 10E3/UL
NRBC BLD AUTO-RTO: 0 /100
O2/TOTAL GAS SETTING VFR VENT: 0 %
PCO2 BLD: 42 MM HG (ref 35–45)
PH BLD: 7.42 [PH] (ref 7.35–7.45)
PLATELET # BLD AUTO: 258 10E3/UL (ref 150–450)
PO2 BLD: 60 MM HG (ref 80–105)
POTASSIUM SERPL-SCNC: 4.3 MMOL/L (ref 3.4–5.3)
RBC # BLD AUTO: 4.51 10E6/UL (ref 4.4–5.9)
RSV RNA SPEC NAA+PROBE: NEGATIVE
SARS-COV-2 RNA RESP QL NAA+PROBE: NEGATIVE
SODIUM SERPL-SCNC: 140 MMOL/L (ref 136–145)
WBC # BLD AUTO: 7.5 10E3/UL (ref 4–11)

## 2022-11-27 PROCEDURE — 250N000011 HC RX IP 250 OP 636: Performed by: STUDENT IN AN ORGANIZED HEALTH CARE EDUCATION/TRAINING PROGRAM

## 2022-11-27 PROCEDURE — 87637 SARSCOV2&INF A&B&RSV AMP PRB: CPT | Performed by: STUDENT IN AN ORGANIZED HEALTH CARE EDUCATION/TRAINING PROGRAM

## 2022-11-27 PROCEDURE — 36415 COLL VENOUS BLD VENIPUNCTURE: CPT | Performed by: STUDENT IN AN ORGANIZED HEALTH CARE EDUCATION/TRAINING PROGRAM

## 2022-11-27 PROCEDURE — 85025 COMPLETE CBC W/AUTO DIFF WBC: CPT | Performed by: STUDENT IN AN ORGANIZED HEALTH CARE EDUCATION/TRAINING PROGRAM

## 2022-11-27 PROCEDURE — 96372 THER/PROPH/DIAG INJ SC/IM: CPT | Performed by: STUDENT IN AN ORGANIZED HEALTH CARE EDUCATION/TRAINING PROGRAM

## 2022-11-27 PROCEDURE — C9803 HOPD COVID-19 SPEC COLLECT: HCPCS | Performed by: STUDENT IN AN ORGANIZED HEALTH CARE EDUCATION/TRAINING PROGRAM

## 2022-11-27 PROCEDURE — 99284 EMERGENCY DEPT VISIT MOD MDM: CPT | Mod: 25 | Performed by: STUDENT IN AN ORGANIZED HEALTH CARE EDUCATION/TRAINING PROGRAM

## 2022-11-27 PROCEDURE — 999N000157 HC STATISTIC RCP TIME EA 10 MIN

## 2022-11-27 PROCEDURE — 82310 ASSAY OF CALCIUM: CPT | Performed by: STUDENT IN AN ORGANIZED HEALTH CARE EDUCATION/TRAINING PROGRAM

## 2022-11-27 PROCEDURE — 83735 ASSAY OF MAGNESIUM: CPT | Performed by: STUDENT IN AN ORGANIZED HEALTH CARE EDUCATION/TRAINING PROGRAM

## 2022-11-27 PROCEDURE — 36600 WITHDRAWAL OF ARTERIAL BLOOD: CPT | Performed by: STUDENT IN AN ORGANIZED HEALTH CARE EDUCATION/TRAINING PROGRAM

## 2022-11-27 PROCEDURE — 82803 BLOOD GASES ANY COMBINATION: CPT | Performed by: STUDENT IN AN ORGANIZED HEALTH CARE EDUCATION/TRAINING PROGRAM

## 2022-11-27 PROCEDURE — 250N000009 HC RX 250: Performed by: STUDENT IN AN ORGANIZED HEALTH CARE EDUCATION/TRAINING PROGRAM

## 2022-11-27 PROCEDURE — 99284 EMERGENCY DEPT VISIT MOD MDM: CPT | Performed by: STUDENT IN AN ORGANIZED HEALTH CARE EDUCATION/TRAINING PROGRAM

## 2022-11-27 PROCEDURE — 71045 X-RAY EXAM CHEST 1 VIEW: CPT | Mod: TC

## 2022-11-27 PROCEDURE — 94640 AIRWAY INHALATION TREATMENT: CPT

## 2022-11-27 RX ORDER — IPRATROPIUM BROMIDE AND ALBUTEROL SULFATE 2.5; .5 MG/3ML; MG/3ML
3 SOLUTION RESPIRATORY (INHALATION) ONCE
Status: COMPLETED | OUTPATIENT
Start: 2022-11-27 | End: 2022-11-27

## 2022-11-27 RX ORDER — DEXAMETHASONE SODIUM PHOSPHATE 10 MG/ML
10 INJECTION, SOLUTION INTRAMUSCULAR; INTRAVENOUS ONCE
Status: COMPLETED | OUTPATIENT
Start: 2022-11-27 | End: 2022-11-27

## 2022-11-27 RX ORDER — PREDNISONE 20 MG/1
TABLET ORAL
Qty: 10 TABLET | Refills: 0 | Status: SHIPPED | OUTPATIENT
Start: 2022-11-27 | End: 2022-12-21

## 2022-11-27 RX ADMIN — IPRATROPIUM BROMIDE AND ALBUTEROL SULFATE 3 ML: .5; 2.5 SOLUTION RESPIRATORY (INHALATION) at 09:51

## 2022-11-27 RX ADMIN — DEXAMETHASONE SODIUM PHOSPHATE 10 MG: 10 INJECTION, SOLUTION INTRAMUSCULAR; INTRAVENOUS at 09:25

## 2022-11-27 ASSESSMENT — ACTIVITIES OF DAILY LIVING (ADL): ADLS_ACUITY_SCORE: 35

## 2022-11-27 NOTE — ED TRIAGE NOTES
Pt arrives via private vehicle with c/o SOB and testing covid positive today. Pt appeared to be in tripod position when writer entered triage room, pt brought back to room. Lung sounds were tight and wheezy, MD notified, respiratory called.

## 2022-11-27 NOTE — DISCHARGE INSTRUCTIONS
Continue to use your DuoNeb at home every 4-6 hours as needed.  Complete 5-day course of prednisone steroid with first dose starting tomorrow.  Please follow-up with primary care provider if not fully resolved after course of prednisone steroid.  Use get well loop provided and return for oxygen values that are repeatedly less than 90%.    Please review attached instructions including reasons to return to the emergency department.

## 2022-11-27 NOTE — ED PROVIDER NOTES
History     Chief Complaint   Patient presents with     Shortness of Breath     Covid Concern       Uriel Monsalve is a 61 year old male presenting for dyspnea. Onset was 3 days ago.  Associate symptoms include fever.  Home positive COVID test today.  2 DuoNeb's taken prior to arrival with mild improvement.  States he usually gets around of steroids when he gets these exacerbations.  Reports history of asthma.  Remote smoker.  Denies chest pain, nausea, vomiting, diarrhea, body aches.  Not on home oxygen.    Allergies   Allergen Reactions     Seasonal        Patient Active Problem List    Diagnosis Date Noted     FH: colon cancer 09/13/2021     Priority: Medium     Reactive airway disease with acute exacerbation, unspecified asthma severity, unspecified whether persistent 07/30/2019     Priority: Medium     History of tobacco use 07/30/2019     Priority: Medium     Mucopurulent chronic bronchitis (H) 02/07/2019     Priority: Medium     Psychosexual dysfunction with inhibited sexual excitement 02/01/2018     Priority: Medium     Diverticulosis of sigmoid colon 01/25/2016     Priority: Medium     H/O adenomatous polyp of colon 01/25/2016     Priority: Medium     Hypercholesterolemia 01/12/2016     Priority: Medium     Hives 01/11/2016     Priority: Medium       No past medical history on file.    Past Surgical History:   Procedure Laterality Date     COLONOSCOPY  01/25/2016    Serrated adenoma  ,F/U 2021     COLONOSCOPY N/A 09/13/2021    F/U 2026 FH colon cancer. Hyperplastic and Sigmoid diverticulosis.     ENDOSCOPIC SINUS SURGERY      No Comments Provided     EXTRACTION(S) DENTAL      No Comments Provided       Family History   Problem Relation Age of Onset     Cancer Father         Cancer     Diabetes Other         Diabetes,Family history     Cancer Brother         Cancer,neck     Cancer Brother         Cancer,throat       Social History     Tobacco Use     Smoking status: Former     Packs/day: 1.00     Years:  "35.00     Pack years: 35.00     Types: Cigarettes     Quit date: 2018     Years since quittin.0     Smokeless tobacco: Never   Vaping Use     Vaping Use: Never used   Substance Use Topics     Alcohol use: No     Alcohol/week: 0.0 standard drinks     Drug use: No     Comment: Drug use: No       Medications:    predniSONE (DELTASONE) 20 MG tablet  albuterol (PROAIR HFA/PROVENTIL HFA/VENTOLIN HFA) 108 (90 Base) MCG/ACT inhaler  albuterol (PROAIR RESPICLICK) 108 (90 Base) MCG/ACT inhaler  Ascorbic Acid (VITAMIN C PO)  calcium polycarbophil (FIBERCON) 625 MG tablet  cholecalciferol (VITAMIN D3) 25 mcg (1000 units) capsule  fluticasone-salmeterol (ADVAIR) 250-50 MCG/DOSE inhaler  fluticasone-salmeterol (ADVAIR) 250-50 MCG/DOSE inhaler  ipratropium - albuterol 0.5 mg/2.5 mg/3 mL (DUONEB) 0.5-2.5 (3) MG/3ML neb solution  Multiple Vitamin (MULTI-VITAMINS) TABS  order for DME  spacer (OPTICHAMBER CE) holding chamber        Review of Systems: See HPI for pertinent negatives and positives. All other systems reviewed and found to be negative.    Physical Exam   BP (!) 156/109   Pulse 95   Temp 97.2  F (36.2  C) (Tympanic)   Resp 20   Ht 1.778 m (5' 10\")   Wt 93.9 kg (207 lb)   SpO2 90%   BMI 29.70 kg/m       General: awake, mildly comfortable  HEENT: atraumatic, on supplemental oxygen  Respiratory: normal effort, diffuse expiratory wheeze, good air movement  Cardiovascular: regular rate and rhythm, no murmurs, extremities appear well perfused  Abdomen: soft, nontender, nondistended  Extremities: no deformities, edema, or tenderness  Skin: warm, dry, no rashes  Neuro: alert, no focal deficits    ED Course           Results for orders placed or performed during the hospital encounter of 22 (from the past 24 hour(s))   Symptomatic; Yes; 2022 Influenza A/B & SARS-CoV2 (COVID-19) Virus PCR Multiplex Nose    Specimen: Nose; Swab   Result Value Ref Range    Influenza A PCR Negative Negative    Influenza " B PCR Negative Negative    RSV PCR Negative Negative    SARS CoV2 PCR Negative Negative    Narrative    Testing was performed using the Xpert Xpress CoV2/Flu/RSV Assay on the [employer] GeneXpert Instrument. This test should be ordered for the detection of SARS-CoV-2 and influenza viruses in individuals who meet clinical and/or epidemiological criteria. Test performance is unknown in asymptomatic patients. This test is for in vitro diagnostic use under the FDA EUA for laboratories certified under CLIA to perform high or moderate complexity testing. This test has not been FDA cleared or approved. A negative result does not rule out the presence of PCR inhibitors in the specimen or target RNA in concentration below the limit of detection for the assay. If only one viral target is positive but coinfection with multiple targets is suspected, the sample should be re-tested with another FDA cleared, approved, or authorized test, if coinfection would change clinical management. This test was validated by the [facility] [employer]. These laboratories are certified under the Clinical Laboratory Improvement Amendments of 1988 (CLIA-88) as qualified to perform high complexity laboratory testing.   CBC with platelets differential    Narrative    The following orders were created for panel order CBC with platelets differential.  Procedure                               Abnormality         Status                     ---------                               -----------         ------                     CBC with platelets and d...[[id number]]                      Final result                 Please view results for these tests on the individual orders.   Basic metabolic panel   Result Value Ref Range    Sodium 140 136 - 145 mmol/L    Potassium 4.3 3.4 - 5.3 mmol/L    Chloride 104 98 - 107 mmol/L    Carbon Dioxide (CO2) 26 22 - 29 mmol/L    Anion Gap 10 7 - 15 mmol/L    Urea Nitrogen 12.1 8.0 - 23.0 mg/dL    Creatinine 0.77 0.67  - 1.17 mg/dL    Calcium 9.2 8.8 - 10.2 mg/dL    Glucose 111 (H) 70 - 99 mg/dL    GFR Estimate >90 >60 mL/min/1.73m2   Magnesium   Result Value Ref Range    Magnesium 1.8 1.7 - 2.3 mg/dL   CBC with platelets and differential   Result Value Ref Range    WBC Count 7.5 4.0 - 11.0 10e3/uL    RBC Count 4.51 4.40 - 5.90 10e6/uL    Hemoglobin 14.6 13.3 - 17.7 g/dL    Hematocrit 43.6 40.0 - 53.0 %    MCV 97 78 - 100 fL    MCH 32.4 26.5 - 33.0 pg    MCHC 33.5 31.5 - 36.5 g/dL    RDW 13.0 10.0 - 15.0 %    Platelet Count 258 150 - 450 10e3/uL    % Neutrophils 63 %    % Lymphocytes 21 %    % Monocytes 7 %    % Eosinophils 8 %    % Basophils 1 %    % Immature Granulocytes 0 %    NRBCs per 100 WBC 0 <1 /100    Absolute Neutrophils 4.7 1.6 - 8.3 10e3/uL    Absolute Lymphocytes 1.6 0.8 - 5.3 10e3/uL    Absolute Monocytes 0.6 0.0 - 1.3 10e3/uL    Absolute Eosinophils 0.6 0.0 - 0.7 10e3/uL    Absolute Basophils 0.1 0.0 - 0.2 10e3/uL    Absolute Immature Granulocytes 0.0 <=0.4 10e3/uL    Absolute NRBCs 0.0 10e3/uL   Extra Tube    Narrative    The following orders were created for panel order Extra Tube.  Procedure                               Abnormality         Status                     ---------                               -----------         ------                     Extra Blue Top Tube[[id number]]                              Final result               Extra Serum Separator Tu...[[id number]]                      Final result               Extra Green Top (Lithium...[[id number]]                      Final result                 Please view results for these tests on the individual orders.   Extra Blue Top Tube   Result Value Ref Range    Hold Specimen JIC    Extra Serum Separator Tube (SST)   Result Value Ref Range    Hold Specimen JIC    Extra Green Top (Lithium Heparin) ON ICE   Result Value Ref Range    Hold Specimen JIC    Blood gas arterial   Result Value Ref Range    pH Arterial 7.42 7.35 - 7.45    pCO2 Arterial 42 35 - 45  mm Hg    pO2 Arterial 60 (L) 80 - 105 mm Hg    FIO2 0     Bicarbonate Arterial 27 21 - 28 mmol/L    Base Excess/Deficit (+/-) 2.1 (H) -9.0 - 1.8 mmol/L    Carlos Manuel's Test No    XR Chest Port 1 View    Narrative    PROCEDURE INFORMATION:   Exam: XR Chest   Exam date and time: 11/27/2022 9:24 AM   Age: 61 years old   Clinical indication: Shortness of breath; Additional info: SOB     TECHNIQUE:   Imaging protocol: Radiologic exam of the chest.   Views: 1 view.     COMPARISON:   CR Chest 2/10/2022 8:35 AM     FINDINGS:   Lungs: Unremarkable. No consolidation.   Pleural spaces: Unremarkable. No pleural effusion. No pneumothorax.   Heart/Mediastinum: Unremarkable. No cardiomegaly.   Bones/joints: Unremarkable.       Impression    IMPRESSION:   No acute findings.     THIS DOCUMENT HAS BEEN ELECTRONICALLY SIGNED BY DEV GREENBERG MD       Medications   dexamethasone PF (DECADRON) injection 10 mg (10 mg Intramuscular Given 11/27/22 0925)   ipratropium - albuterol 0.5 mg/2.5 mg/3 mL (DUONEB) neb solution 3 mL (3 mLs Nebulization Given 11/27/22 0951)       Assessments & Plan (with Medical Decision Making)     I have reviewed the nursing notes.    61 year old male evaluated for dyspnea.  SPO2 87% on room air.  Exam with moderate expiratory wheeze but good air movement.  Evaluation with hypoxia PO2 60.  Patient given DuoNeb and steroids with significant improvement feeling appropriate for discharge home. Re-exam with decreased expiratory wheeze now mild.  Negative COVID.  His SPO2's have been in the low 90s and will be sent home with a get well loop along with steroid course.  Discharged home with instructions on diagnosis including ED return precautions.      I have reviewed the findings, diagnosis, plan and need for follow up with the patient.    Patient instructions:   Continue to use your DuoNeb at home every 4-6 hours as needed.  Complete 5-day course of prednisone steroid with first dose starting tomorrow.  Please follow-up  with primary care provider if not fully resolved after course of prednisone steroid.  Use get well loop provided and return for oxygen values that are repeatedly less than 90%.    Please review attached instructions including reasons to return to the emergency department.    New Prescriptions    PREDNISONE (DELTASONE) 20 MG TABLET    Take two tablets (= 40mg) each day for 5 (five) days       Final diagnoses:   Exacerbation of asthma, unspecified asthma severity, unspecified whether persistent       11/27/2022   [facility]       Sreekanth Rivers MD  11/27/22 1116       Sreekanth Rivers MD  11/27/22 1114

## 2022-12-20 DIAGNOSIS — J41.1 MUCOPURULENT CHRONIC BRONCHITIS (H): Primary | ICD-10-CM

## 2022-12-21 RX ORDER — PREDNISONE 20 MG/1
TABLET ORAL
Qty: 10 TABLET | Refills: 0 | Status: SHIPPED | OUTPATIENT
Start: 2022-12-21 | End: 2022-12-23

## 2022-12-21 NOTE — TELEPHONE ENCOUNTER
[id number] of [place] sent Rx request for the following:      Requested Prescriptions   Pending Prescriptions Disp Refills     predniSONE (DELTASONE) 20 MG tablet 10 tablet 0     Sig: Take two tablets (= 40mg) each day for 5 (five) days   Last Prescription Date:   11/27/22  Last Fill Qty/Refills:         10, R-0  (Sreekanth Rivers)  Last Office Visit:              10/7/22   Future Office visit:             Next 5 appointments (look out 90 days)    Dec 22, 2022 11:40 AM  SHORT with Fernando Macedo MD  [facility] ([facility] ) [address]  [place] [id number] [phone]        Per LOV note:  Return in about 1 week (around 10/14/2022).      Appointment tomorrow.    Provider to associate diagnosis.    Geri Nixon RN .............. 12/21/2022  2:01 PM

## 2022-12-22 ENCOUNTER — OFFICE VISIT (OUTPATIENT)
Dept: FAMILY MEDICINE | Facility: OTHER | Age: 62
End: 2022-12-22
Attending: FAMILY MEDICINE
Payer: COMMERCIAL

## 2022-12-22 VITALS
BODY MASS INDEX: 30.56 KG/M2 | WEIGHT: 213 LBS | HEART RATE: 87 BPM | SYSTOLIC BLOOD PRESSURE: 112 MMHG | OXYGEN SATURATION: 94 % | DIASTOLIC BLOOD PRESSURE: 62 MMHG | TEMPERATURE: 98.2 F | RESPIRATION RATE: 20 BRPM

## 2022-12-22 DIAGNOSIS — J45.41 MODERATE PERSISTENT REACTIVE AIRWAY DISEASE WITH WHEEZING WITH ACUTE EXACERBATION: ICD-10-CM

## 2022-12-22 DIAGNOSIS — J44.1 COPD EXACERBATION (H): ICD-10-CM

## 2022-12-22 PROCEDURE — 99213 OFFICE O/P EST LOW 20 MIN: CPT | Performed by: FAMILY MEDICINE

## 2022-12-22 RX ORDER — PREDNISONE 20 MG/1
TABLET ORAL
Qty: 20 TABLET | Refills: 5 | Status: SHIPPED | OUTPATIENT
Start: 2022-12-22 | End: 2023-06-14

## 2022-12-22 RX ORDER — ALBUTEROL SULFATE 90 UG/1
2 AEROSOL, METERED RESPIRATORY (INHALATION) EVERY 6 HOURS
Qty: 18 G | Refills: 1 | Status: CANCELLED | OUTPATIENT
Start: 2022-12-22

## 2022-12-22 RX ORDER — FLUTICASONE PROPIONATE AND SALMETEROL 250; 50 UG/1; UG/1
1 POWDER RESPIRATORY (INHALATION) EVERY 12 HOURS
Qty: 90 EACH | Refills: 4 | Status: SHIPPED | OUTPATIENT
Start: 2022-12-22 | End: 2023-11-09

## 2022-12-22 RX ORDER — IPRATROPIUM BROMIDE AND ALBUTEROL SULFATE 2.5; .5 MG/3ML; MG/3ML
1 SOLUTION RESPIRATORY (INHALATION) EVERY 6 HOURS PRN
Qty: 90 ML | Refills: 4 | Status: SHIPPED | OUTPATIENT
Start: 2022-12-22 | End: 2023-11-09

## 2022-12-22 RX ORDER — ALBUTEROL SULFATE 90 UG/1
1-2 POWDER, METERED RESPIRATORY (INHALATION) EVERY 4 HOURS PRN
Qty: 2 EACH | Refills: 4 | Status: SHIPPED | OUTPATIENT
Start: 2022-12-22 | End: 2023-02-27

## 2022-12-22 ASSESSMENT — ANXIETY QUESTIONNAIRES
5. BEING SO RESTLESS THAT IT IS HARD TO SIT STILL: NOT AT ALL
7. FEELING AFRAID AS IF SOMETHING AWFUL MIGHT HAPPEN: NOT AT ALL
8. IF YOU CHECKED OFF ANY PROBLEMS, HOW DIFFICULT HAVE THESE MADE IT FOR YOU TO DO YOUR WORK, TAKE CARE OF THINGS AT HOME, OR GET ALONG WITH OTHER PEOPLE?: NOT DIFFICULT AT ALL
2. NOT BEING ABLE TO STOP OR CONTROL WORRYING: NOT AT ALL
GAD7 TOTAL SCORE: 0
1. FEELING NERVOUS, ANXIOUS, OR ON EDGE: NOT AT ALL
IF YOU CHECKED OFF ANY PROBLEMS ON THIS QUESTIONNAIRE, HOW DIFFICULT HAVE THESE PROBLEMS MADE IT FOR YOU TO DO YOUR WORK, TAKE CARE OF THINGS AT HOME, OR GET ALONG WITH OTHER PEOPLE: NOT DIFFICULT AT ALL
6. BECOMING EASILY ANNOYED OR IRRITABLE: NOT AT ALL
GAD7 TOTAL SCORE: 0
4. TROUBLE RELAXING: NOT AT ALL
7. FEELING AFRAID AS IF SOMETHING AWFUL MIGHT HAPPEN: NOT AT ALL
3. WORRYING TOO MUCH ABOUT DIFFERENT THINGS: NOT AT ALL

## 2022-12-22 ASSESSMENT — PAIN SCALES - GENERAL: PAINLEVEL: MODERATE PAIN (4)

## 2022-12-22 NOTE — NURSING NOTE
Patient here for medication refills. He would like a refill of the epipen and discuss taking a daily prednisone. Medication Reconciliation: complete.    Reina Jordan LPN  12/22/2022 12:07 PM

## 2022-12-23 ASSESSMENT — ANXIETY QUESTIONNAIRES: GAD7 TOTAL SCORE: 0

## 2022-12-23 NOTE — PROGRESS NOTES
"  Assessment & Plan     COPD exacerbation (H)    - albuterol (PROAIR RESPICLICK) 108 (90 Base) MCG/ACT inhaler; Inhale 1-2 puffs into the lungs every 4 hours as needed for shortness of breath or wheezing  - fluticasone-salmeterol (ADVAIR) 250-50 MCG/ACT inhaler; Inhale 1 puff into the lungs every 12 hours  - predniSONE (DELTASONE) 20 MG tablet; Take 3 tabs by mouth daily x 3 days, then 2 tabs daily x 3 days, then 1 tab daily x 3 days, then 1/2 tab daily x 3 days.    Moderate persistent reactive airway disease with wheezing with acute exacerbation    - ipratropium - albuterol 0.5 mg/2.5 mg/3 mL (DUONEB) 0.5-2.5 (3) MG/3ML neb solution; Take 1 vial (3 mLs) by nebulization every 6 hours as needed for shortness of breath or wheezing  - Pulmonary Function Test (); Future      Discussed with the patient chronic use versus intermittent use.  I advised him probably somewhere between 5 to 10 mg a day if he uses it daily.  Also discussed the taper does not necessarily have to be a full taper he can quickly decrease his dose if he has good results.  Discussed long-term use of prednisone and complications.  For now patient would like to just take it intermittently.  We will gauge to see with the lowest dose he can use to get good relief  Follow-up in 3 months to discuss further options  He is given the number of refills for his taper.  Would also like to get a pulmonary function test.  His last 1 about 3 years ago to see if there is any significant changes.       BMI:   Estimated body mass index is 30.56 kg/m  as calculated from the following:    Height as of 11/27/22: 1.778 m (5' 10\").    Weight as of this encounter: 96.6 kg (213 lb).           Return in about 3 months (around 3/22/2023).    Fernando Macedo MD  [facility]    Jean Paul Booth is a 62 year old, presenting for the following health issues:  Recheck Medication      Patient arrives here for COPD follow-up.  Recently seen and started on " prednisone taper.  He states that he gets very good results with the taper but has had to use that every month in the last 12 months or so.  Recently he removed was advised that maybe he would be at candidate for chronic prednisone.  Would like to discuss it further.  He states typically after the first couple doses he gets excellent results with the prednisone    History of Present Illness     Asthma:  He presents for follow up of asthma.  He has some cough, no wheezing, and no shortness of breath. He is using a relief medication daily. He does not miss any doses of his controller medication throughout the week.Patient is aware of the following triggers: same as previous visit. The patient has had a visit to the Emergency Room, Urgent Care or Hospital due to asthma since the last clinic visit. He has been to the Emergency Room or Urgent Care 1 time.He has had a Hospitalization 0 times.    COPD:  He presents for follow up of COPD.  Overall, COPD symptoms are stable since last visit. He has same as usual fatigue or shortness of breath with exertion and no shortness of breath at rest.He sometimes coughs and does not have change in sputum. No recent fever. He can walk 2-5 blocks without stopping to rest. He can walk 3 or more flights of stairs without resting.The patient has had an ED, urgent care, or hospital admission because of COPD since the last visit. He states he has had 1 visit(s) to an ED, Urgent Care, or Hospital due to his COPD.    He eats 0-1 servings of fruits and vegetables daily.He exercises with enough effort to increase his heart rate 20 to 29 minutes per day.  He exercises with enough effort to increase his heart rate 4 days per week. He is missing 1 dose(s) of medications per week.  Today's TRACI-7 Score: 0             Review of Systems         Objective    /62   Pulse 87   Temp 98.2  F (36.8  C)   Resp 20   Wt 96.6 kg (213 lb)   SpO2 94%   BMI 30.56 kg/m    Body mass index is 30.56  kg/m .  Physical Exam  Constitutional:       Appearance: Normal appearance.   Cardiovascular:      Rate and Rhythm: Normal rate and regular rhythm.   Pulmonary:      Effort: Pulmonary effort is normal.   Neurological:      Mental Status: He is alert.   Psychiatric:         Mood and Affect: Mood normal.         Thought Content: Thought content normal.

## 2022-12-29 DIAGNOSIS — J41.1 MUCOPURULENT CHRONIC BRONCHITIS (H): ICD-10-CM

## 2022-12-29 RX ORDER — EPINEPHRINE 0.3 MG/.3ML
INJECTION SUBCUTANEOUS
Qty: 0.3 ML | Refills: 1 | Status: SHIPPED | OUTPATIENT
Start: 2022-12-29 | End: 2023-11-09

## 2022-12-29 NOTE — TELEPHONE ENCOUNTER
[id number] of [place] sent Rx request for the following:      EPINEPHrine (EPIPEN/ADRENACLICK/OR ANY BX GENERIC EQUIV) 0.3 MG/0.3ML injection 2-pack 0.3 mL 1 2/7/2019 2/7/2020 --   Sig - Route: Inject 0.3 mLs (0.3 mg) into the muscle as needed (sob) - Intramuscular       Anaphylaxis Kits Protocol Failed - 12/29/2022  3:55 PM        Failed - Medication is active on med list     Last Office Visit:              12/22/22  Future Office visit:           None    Per LOV note:  Return in about 3 months (around 3/22/2023).    In clinical absence of patient's primary, Fernando Macedo, patient is requesting that this message be sent to the covering provider for consideration please.    Geri Nixon RN .............. 12/29/2022  3:56 PM

## 2023-02-27 ENCOUNTER — TELEPHONE (OUTPATIENT)
Dept: FAMILY MEDICINE | Facility: OTHER | Age: 63
End: 2023-02-27
Payer: COMMERCIAL

## 2023-02-27 DIAGNOSIS — J45.901 REACTIVE AIRWAY DISEASE WITH ACUTE EXACERBATION, UNSPECIFIED ASTHMA SEVERITY, UNSPECIFIED WHETHER PERSISTENT: Primary | ICD-10-CM

## 2023-02-27 RX ORDER — ALBUTEROL SULFATE 90 UG/1
2 AEROSOL, METERED RESPIRATORY (INHALATION) EVERY 6 HOURS PRN
Qty: 18 G | Refills: 3 | Status: SHIPPED | OUTPATIENT
Start: 2023-02-27 | End: 2023-06-28

## 2023-02-27 NOTE — TELEPHONE ENCOUNTER
Patient called stating that his albuterol inhaler prescription they are no longer making it and he would like a new script for the generic brand to [facility]. Reina Jordan LPN .......................2/27/2023  9:03 AM

## 2023-06-14 ENCOUNTER — TELEPHONE (OUTPATIENT)
Dept: FAMILY MEDICINE | Facility: OTHER | Age: 63
End: 2023-06-14
Payer: COMMERCIAL

## 2023-06-14 DIAGNOSIS — J44.1 COPD EXACERBATION (H): ICD-10-CM

## 2023-06-14 RX ORDER — PREDNISONE 20 MG/1
TABLET ORAL
Qty: 20 TABLET | Refills: 5 | Status: SHIPPED | OUTPATIENT
Start: 2023-06-14 | End: 2024-01-16
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
## 2023-06-28 ENCOUNTER — OFFICE VISIT (OUTPATIENT)
Dept: FAMILY MEDICINE | Facility: OTHER | Age: 63
End: 2023-06-28
Attending: FAMILY MEDICINE
Payer: COMMERCIAL

## 2023-06-28 VITALS
HEART RATE: 81 BPM | OXYGEN SATURATION: 93 % | SYSTOLIC BLOOD PRESSURE: 116 MMHG | TEMPERATURE: 98.1 F | WEIGHT: 212.2 LBS | DIASTOLIC BLOOD PRESSURE: 68 MMHG | BODY MASS INDEX: 30.45 KG/M2 | RESPIRATION RATE: 22 BRPM

## 2023-06-28 DIAGNOSIS — Z87.891 HISTORY OF TOBACCO USE: ICD-10-CM

## 2023-06-28 DIAGNOSIS — J45.901 REACTIVE AIRWAY DISEASE WITH ACUTE EXACERBATION, UNSPECIFIED ASTHMA SEVERITY, UNSPECIFIED WHETHER PERSISTENT: ICD-10-CM

## 2023-06-28 DIAGNOSIS — J41.1 MUCOPURULENT CHRONIC BRONCHITIS (H): Primary | ICD-10-CM

## 2023-06-28 DIAGNOSIS — J44.1 COPD EXACERBATION (H): ICD-10-CM

## 2023-06-28 PROCEDURE — 99214 OFFICE O/P EST MOD 30 MIN: CPT | Performed by: FAMILY MEDICINE

## 2023-06-28 RX ORDER — PREDNISONE 20 MG/1
TABLET ORAL
Qty: 20 TABLET | Refills: 5 | Status: CANCELLED | OUTPATIENT
Start: 2023-06-28

## 2023-06-28 RX ORDER — ALBUTEROL SULFATE 90 UG/1
2 AEROSOL, METERED RESPIRATORY (INHALATION) EVERY 6 HOURS PRN
Qty: 18 G | Refills: 3 | Status: SHIPPED | OUTPATIENT
Start: 2023-06-28 | End: 2023-11-09

## 2023-06-28 RX ORDER — PREDNISONE 10 MG/1
TABLET ORAL
Qty: 60 TABLET | Refills: 5 | Status: SHIPPED | OUTPATIENT
Start: 2023-06-28 | End: 2023-11-09

## 2023-06-28 ASSESSMENT — PAIN SCALES - GENERAL: PAINLEVEL: NO PAIN (0)

## 2023-06-28 NOTE — NURSING NOTE
Patient here for medication review and refills. Medication Reconciliation: complete.    Reina Jordan LPN  6/28/2023 9:25 AM

## 2023-06-29 NOTE — PROGRESS NOTES
SUBJECTIVE:   Uriel Monsalve is a 62 year old male who presents to clinic today for the following health issues: Discussed prednisone    Patient arrives here to discuss prednisone.  He is using it intermittently but fairly often.  He will take 3 to 4 tablets for 2 to 3 days with good relief.  Then he might go few days without it.  He is wondering about continuous use.  His last PFTs was done in 2019.  Progressively gotten worse.  He has problems with exercise admitted to cold air and pollen patient does have a 35 year pack history tobacco abuse quit in 2018.  He has not had a screening CT scan.  He has a little over 35-pack-year history.  He declines CT scan screening.    History of Present Illness     Asthma:  He presents for follow up of asthma.  He has no cough, no wheezing, and no shortness of breath. He is using a relief medication 2-3 times per day. He typically misses taking his controller medication 1 time(s) per week.Patient is aware of the following triggers: same as previous visit, cold air, exercise or sports, humidity, pollens, smoke, strong odors and fumes and upper respiratory infections. The patient has not had a visit to the Emergency Room, Urgent Care or Hospital due to asthma since the last clinic visit.     He eats 0-1 servings of fruits and vegetables daily.He consumes 3 sweetened beverage(s) daily.He exercises with enough effort to increase his heart rate 20 to 29 minutes per day.  He exercises with enough effort to increase his heart rate 5 days per week. He is missing 1 dose(s) of medications per week.        Patient Active Problem List    Diagnosis Date Noted     FH: colon cancer 09/13/2021     Priority: Medium     Reactive airway disease with acute exacerbation, unspecified asthma severity, unspecified whether persistent 07/30/2019     Priority: Medium     History of tobacco use 07/30/2019     Priority: Medium     Mucopurulent chronic bronchitis (H) 02/07/2019     Priority: Medium      Psychosexual dysfunction with inhibited sexual excitement 02/01/2018     Priority: Medium     Diverticulosis of sigmoid colon 01/25/2016     Priority: Medium     H/O adenomatous polyp of colon 01/25/2016     Priority: Medium     Hypercholesterolemia 01/12/2016     Priority: Medium     Hives 01/11/2016     Priority: Medium       Review of Systems     OBJECTIVE:     /68   Pulse 81   Temp 98.1  F (36.7  C)   Resp 22   Wt 96.3 kg (212 lb 3.2 oz)   SpO2 93%   BMI 30.45 kg/m    Body mass index is 30.45 kg/m .  Physical Exam  Constitutional:       Comments: Older than stated age   Cardiovascular:      Rate and Rhythm: Normal rate and regular rhythm.   Pulmonary:      Effort: Pulmonary effort is normal.      Breath sounds: Normal breath sounds.   Neurological:      Mental Status: He is alert.   Psychiatric:         Mood and Affect: Mood normal.         Diagnostic Test Results:  none     ASSESSMENT/PLAN:         (J41.1) Mucopurulent chronic bronchitis (H)  (primary encounter diagnosis)  Comment:   Plan:     (J45.901) Reactive airway disease with acute exacerbation, unspecified asthma severity, unspecified whether persistent  Comment:   Plan: albuterol (PROAIR HFA/PROVENTIL HFA/VENTOLIN         HFA) 108 (90 Base) MCG/ACT inhaler, General PFT        Lab (Please always keep checked), Pulmonary         Function Test            (J44.1) COPD exacerbation (H)  Comment:   Plan: General PFT Lab (Please always keep checked),         Pulmonary Function Test, predniSONE (DELTASONE)        10 MG tablet, Adult Pulmonary Medicine Referral            (Z87.891) History of tobacco use  Comment:   Plan:     Discussed with patient increasing use of prednisone.  We discussed long-term continuous use versus intermittent.  We discussed the need for follow-up PFT.  We will add Singulair.  Also obtain a pulmonary consult.  We will follow-up at the end of this prescription and use of prednisone to determine future plan of care.  Patient  is currently not under good control.  And he was advised of it.    Fernando Macedo MD  [facility]

## 2023-07-24 ENCOUNTER — HOSPITAL ENCOUNTER (OUTPATIENT)
Dept: RESPIRATORY THERAPY | Facility: OTHER | Age: 63
Discharge: HOME | End: 2023-07-24
Attending: FAMILY MEDICINE | Admitting: FAMILY MEDICINE
Payer: COMMERCIAL

## 2023-07-24 DIAGNOSIS — J44.1 COPD EXACERBATION (H): ICD-10-CM

## 2023-07-24 DIAGNOSIS — J45.901 REACTIVE AIRWAY DISEASE WITH ACUTE EXACERBATION, UNSPECIFIED ASTHMA SEVERITY, UNSPECIFIED WHETHER PERSISTENT: ICD-10-CM

## 2023-07-24 PROCEDURE — 94726 PLETHYSMOGRAPHY LUNG VOLUMES: CPT

## 2023-07-24 PROCEDURE — 94729 DIFFUSING CAPACITY: CPT | Mod: 26 | Performed by: INTERNAL MEDICINE

## 2023-07-24 PROCEDURE — 250N000009 HC RX 250: Performed by: FAMILY MEDICINE

## 2023-07-24 PROCEDURE — 94060 EVALUATION OF WHEEZING: CPT

## 2023-07-24 PROCEDURE — 999N000157 HC STATISTIC RCP TIME EA 10 MIN

## 2023-07-24 PROCEDURE — 94729 DIFFUSING CAPACITY: CPT

## 2023-07-24 PROCEDURE — 94060 EVALUATION OF WHEEZING: CPT | Mod: 26 | Performed by: INTERNAL MEDICINE

## 2023-07-24 PROCEDURE — 94726 PLETHYSMOGRAPHY LUNG VOLUMES: CPT | Mod: 26 | Performed by: INTERNAL MEDICINE

## 2023-07-24 RX ORDER — ALBUTEROL SULFATE 0.83 MG/ML
2.5 SOLUTION RESPIRATORY (INHALATION) ONCE
Status: COMPLETED | OUTPATIENT
Start: 2023-07-24 | End: 2023-07-24

## 2023-07-24 RX ADMIN — ALBUTEROL SULFATE 2.5 MG: 2.5 SOLUTION RESPIRATORY (INHALATION) at 14:36

## 2023-11-09 ENCOUNTER — OFFICE VISIT (OUTPATIENT)
Dept: FAMILY MEDICINE | Facility: OTHER | Age: 63
End: 2023-11-09
Attending: FAMILY MEDICINE
Payer: COMMERCIAL

## 2023-11-09 VITALS
WEIGHT: 211 LBS | BODY MASS INDEX: 30.21 KG/M2 | HEART RATE: 75 BPM | HEIGHT: 70 IN | DIASTOLIC BLOOD PRESSURE: 72 MMHG | SYSTOLIC BLOOD PRESSURE: 126 MMHG | OXYGEN SATURATION: 96 % | TEMPERATURE: 97.9 F | RESPIRATION RATE: 20 BRPM

## 2023-11-09 DIAGNOSIS — J45.41 MODERATE PERSISTENT REACTIVE AIRWAY DISEASE WITH WHEEZING WITH ACUTE EXACERBATION: ICD-10-CM

## 2023-11-09 DIAGNOSIS — J45.901 REACTIVE AIRWAY DISEASE WITH ACUTE EXACERBATION, UNSPECIFIED ASTHMA SEVERITY, UNSPECIFIED WHETHER PERSISTENT: ICD-10-CM

## 2023-11-09 DIAGNOSIS — J44.1 COPD EXACERBATION (H): ICD-10-CM

## 2023-11-09 DIAGNOSIS — Z02.89 HEALTH EXAMINATION OF DEFINED SUBPOPULATION: Primary | ICD-10-CM

## 2023-11-09 DIAGNOSIS — J41.1 MUCOPURULENT CHRONIC BRONCHITIS (H): ICD-10-CM

## 2023-11-09 PROCEDURE — 91320 SARSCV2 VAC 30MCG TRS-SUC IM: CPT | Performed by: FAMILY MEDICINE

## 2023-11-09 PROCEDURE — 99396 PREV VISIT EST AGE 40-64: CPT | Mod: 25 | Performed by: FAMILY MEDICINE

## 2023-11-09 PROCEDURE — 90471 IMMUNIZATION ADMIN: CPT | Performed by: FAMILY MEDICINE

## 2023-11-09 PROCEDURE — 90480 ADMN SARSCOV2 VAC 1/ONLY CMP: CPT | Performed by: FAMILY MEDICINE

## 2023-11-09 PROCEDURE — 90682 RIV4 VACC RECOMBINANT DNA IM: CPT | Performed by: FAMILY MEDICINE

## 2023-11-09 RX ORDER — ALBUTEROL SULFATE 90 UG/1
2 AEROSOL, METERED RESPIRATORY (INHALATION) EVERY 6 HOURS PRN
Qty: 18 G | Refills: 4 | Status: SHIPPED | OUTPATIENT
Start: 2023-11-09 | End: 2024-05-06

## 2023-11-09 RX ORDER — FLUTICASONE PROPIONATE AND SALMETEROL 250; 50 UG/1; UG/1
1 POWDER RESPIRATORY (INHALATION) EVERY 12 HOURS
Qty: 90 EACH | Refills: 4 | Status: SHIPPED | OUTPATIENT
Start: 2023-11-09 | End: 2024-05-06

## 2023-11-09 RX ORDER — PREDNISONE 10 MG/1
TABLET ORAL
Qty: 30 TABLET | Refills: 3 | Status: SHIPPED | OUTPATIENT
Start: 2023-11-09 | End: 2024-01-16

## 2023-11-09 RX ORDER — IPRATROPIUM BROMIDE AND ALBUTEROL SULFATE 2.5; .5 MG/3ML; MG/3ML
1 SOLUTION RESPIRATORY (INHALATION) EVERY 6 HOURS PRN
Qty: 90 ML | Refills: 4 | Status: SHIPPED | OUTPATIENT
Start: 2023-11-09 | End: 2024-05-06

## 2023-11-09 RX ORDER — EPINEPHRINE 0.3 MG/.3ML
INJECTION SUBCUTANEOUS
Qty: 0.3 ML | Refills: 1 | Status: SHIPPED | OUTPATIENT
Start: 2023-11-09 | End: 2024-05-06

## 2023-11-09 ASSESSMENT — PAIN SCALES - GENERAL: PAINLEVEL: NO PAIN (0)

## 2023-11-09 NOTE — PROGRESS NOTES
SUBJECTIVE:   Uriel Monsalve is a 62 year old male who presents to clinic today for the following health issues: Medication refill  Physical  Patient arrives here for physical and Medicare refill.  Review of the chart shows he is in need of lung cancer screening.  But indicates he would like to wait until he is on Medicare.  He does take prednisone on a as needed basis but reports he has not taken prednisone for over a month.  Occasionally in the morning he does get a productive cough.  But that resolves after short period of time.    Healthy Habits:     Getting at least 3 servings of Calcium per day:  NO    Bi-annual eye exam:  Yes    Dental care twice a year:  NO    Sleep apnea or symptoms of sleep apnea:  Sleep apnea    Diet:  Regular (no restrictions)    Frequency of exercise:  1 day/week    Duration of exercise:  N/A    Taking medications regularly:  Yes    Medication side effects:  None    Additional concerns today:  No        Patient Active Problem List    Diagnosis Date Noted    FH: colon cancer 09/13/2021     Priority: Medium    Reactive airway disease with acute exacerbation, unspecified asthma severity, unspecified whether persistent 07/30/2019     Priority: Medium    History of tobacco use 07/30/2019     Priority: Medium    Mucopurulent chronic bronchitis (H) 02/07/2019     Priority: Medium    Psychosexual dysfunction with inhibited sexual excitement 02/01/2018     Priority: Medium    Diverticulosis of sigmoid colon 01/25/2016     Priority: Medium    H/O adenomatous polyp of colon 01/25/2016     Priority: Medium    Hypercholesterolemia 01/12/2016     Priority: Medium    Hives 01/11/2016     Priority: Medium     No past medical history on file.   Past Surgical History:   Procedure Laterality Date    COLONOSCOPY  01/25/2016    Serrated adenoma  ,F/U 2021    COLONOSCOPY N/A 09/13/2021    F/U 2026 FH colon cancer. Hyperplastic and Sigmoid diverticulosis.    ENDOSCOPIC SINUS SURGERY      No Comments Provided  "   EXTRACTION(S) DENTAL      No Comments Provided       Review of Systems   Genitourinary:  Negative for impotence and penile discharge.        OBJECTIVE:     /72   Pulse 75   Temp 97.9  F (36.6  C)   Resp 20   Ht 1.784 m (5' 10.25\")   Wt 95.7 kg (211 lb)   SpO2 96%   BMI 30.06 kg/m    Body mass index is 30.06 kg/m .  Physical Exam  Constitutional:       Appearance: Normal appearance.   HENT:      Head: Normocephalic.   Cardiovascular:      Rate and Rhythm: Normal rate and regular rhythm.   Neurological:      Mental Status: He is alert.   Psychiatric:         Mood and Affect: Mood normal.         Diagnostic Test Results:  Patient would like to wait until he is on Medicare prior to any laboratory testing.    ASSESSMENT/PLAN:         (Z02.89) Health examination of defined subpopulation  (primary encounter diagnosis)  Comment:   Plan: Medications are refilled.  I did discuss the long-term effects of prednisone and its side effects.  Patient voices an understanding.  Patient reports she will be on Medicare in the near future and will proceed with Medicare wellness exams.    (J45.901) Reactive airway disease with acute exacerbation, unspecified asthma severity, unspecified whether persistent  Comment:   Plan: albuterol (PROAIR HFA/PROVENTIL HFA/VENTOLIN         HFA) 108 (90 Base) MCG/ACT inhaler            (J41.1) Mucopurulent chronic bronchitis (H)  Comment:   Plan: EPINEPHrine (ANY BX GENERIC EQUIV) 0.3 MG/0.3ML        injection 2-pack            (J44.1) COPD exacerbation (H)  Comment:   Plan: fluticasone-salmeterol (ADVAIR) 250-50 MCG/ACT         inhaler, predniSONE (DELTASONE) 10 MG tablet            (J45.41) Moderate persistent reactive airway disease with wheezing with acute exacerbation  Comment:   Plan: ipratropium - albuterol 0.5 mg/2.5 mg/3 mL         (DUONEB) 0.5-2.5 (3) MG/3ML neb solution              Fernando Macedo MD  [facility]  Answers submitted by the patient for " this visit:  Annual Preventive Visit (Submitted on 11/9/2023)  Chief Complaint: Annual Exam:  Frequency of exercise:: 1 day/week  Getting at least 3 servings of Calcium per day:: NO  Diet:: Regular (no restrictions)  Taking medications regularly:: Yes  Medication side effects:: None  Bi-annual eye exam:: Yes  Dental care twice a year:: NO  Sleep apnea or symptoms of sleep apnea:: Sleep apnea  impotence: No  penile discharge: No  Additional concerns today:: No  Exercise outside of work (Submitted on 11/9/2023)  Chief Complaint: Annual Exam:  Duration of exercise:: N/A

## 2023-11-09 NOTE — NURSING NOTE
Patient here for yearly physical and medication refills. Last eye exam 2022 and last dental exam 2021. Medication Reconciliation: complete.    Reina Jordan LPN  11/9/2023 8:53 AM

## 2024-01-08 ENCOUNTER — PATIENT OUTREACH (OUTPATIENT)
Dept: GASTROENTEROLOGY | Facility: CLINIC | Age: 64
End: 2024-01-08
Payer: COMMERCIAL

## 2024-01-11 ENCOUNTER — HOSPITAL ENCOUNTER (OUTPATIENT)
Dept: GENERAL RADIOLOGY | Facility: OTHER | Age: 64
Discharge: HOME | End: 2024-01-11
Attending: NURSE PRACTITIONER
Payer: MEDICARE

## 2024-01-11 ENCOUNTER — OFFICE VISIT (OUTPATIENT)
Dept: INTERNAL MEDICINE | Facility: OTHER | Age: 64
End: 2024-01-11
Attending: NURSE PRACTITIONER
Payer: MEDICARE

## 2024-01-11 VITALS
WEIGHT: 226.2 LBS | OXYGEN SATURATION: 92 % | RESPIRATION RATE: 19 BRPM | TEMPERATURE: 97.4 F | HEART RATE: 82 BPM | SYSTOLIC BLOOD PRESSURE: 134 MMHG | DIASTOLIC BLOOD PRESSURE: 91 MMHG | HEIGHT: 70 IN | BODY MASS INDEX: 32.38 KG/M2

## 2024-01-11 DIAGNOSIS — S29.9XXA TRAUMATIC INJURY OF RIB: ICD-10-CM

## 2024-01-11 DIAGNOSIS — S29.9XXA TRAUMATIC INJURY OF RIB: Primary | ICD-10-CM

## 2024-01-11 DIAGNOSIS — J41.1 MUCOPURULENT CHRONIC BRONCHITIS (H): ICD-10-CM

## 2024-01-11 DIAGNOSIS — S29.012A MUSCLE STRAIN OF RIGHT UPPER BACK, INITIAL ENCOUNTER: ICD-10-CM

## 2024-01-11 PROCEDURE — G0463 HOSPITAL OUTPT CLINIC VISIT: HCPCS | Mod: 25 | Performed by: NURSE PRACTITIONER

## 2024-01-11 PROCEDURE — G0463 HOSPITAL OUTPT CLINIC VISIT: HCPCS | Performed by: NURSE PRACTITIONER

## 2024-01-11 PROCEDURE — 99213 OFFICE O/P EST LOW 20 MIN: CPT | Performed by: NURSE PRACTITIONER

## 2024-01-11 PROCEDURE — 71101 X-RAY EXAM UNILAT RIBS/CHEST: CPT | Mod: RT

## 2024-01-11 RX ORDER — TRAMADOL HYDROCHLORIDE 50 MG/1
50 TABLET ORAL EVERY 6 HOURS PRN
Qty: 10 TABLET | Refills: 0 | Status: SHIPPED | OUTPATIENT
Start: 2024-01-11 | End: 2024-01-15

## 2024-01-11 ASSESSMENT — ENCOUNTER SYMPTOMS
BACK PAIN: 1
FEVER: 0
SHORTNESS OF BREATH: 0
CHILLS: 0
COUGH: 1

## 2024-01-11 ASSESSMENT — PAIN SCALES - GENERAL: PAINLEVEL: WORST PAIN (10)

## 2024-01-11 NOTE — NURSING NOTE
"Chief Complaint   Patient presents with    Back Pain     Right low side of back/flank     Present for 3 days. Patient states that he was in the garage and lifting and twisting a  when he sneezed and coughed at the same time and then the pain started right after. Patient states it is much worse when coughing.    Initial BP (!) 134/91 (BP Location: Right arm, Patient Position: Sitting, Cuff Size: Adult Large)   Pulse 82   Temp 97.4  F (36.3  C) (Temporal)   Resp 19   Ht 1.778 m (5' 10\")   Wt 102.6 kg (226 lb 3.2 oz)   SpO2 92%   BMI 32.46 kg/m   Estimated body mass index is 32.46 kg/m  as calculated from the following:    Height as of this encounter: 1.778 m (5' 10\").    Weight as of this encounter: 102.6 kg (226 lb 3.2 oz).       FOOD SECURITY SCREENING QUESTIONS:    The next two questions are to help us understand your food security.  If you are feeling you need any assistance in this area, we have resources available to support you today.    Hunger Vital Signs:  Within the past 12 months we worried whether our food would run out before we got money to buy more. Never  Within the past 12 months the food we bought just didn't last and we didn't have money to get more. Never  Chasity Rojas LPN on 1/11/2024 at 8:42 AM     Chasity Rojas   "

## 2024-01-11 NOTE — PROGRESS NOTES
ICD-10-CM    1. Traumatic injury of rib- posterior right lower  S29.9XXA XR Ribs & Chest Rt 3vw     traMADol (ULTRAM) 50 MG tablet      2. Muscle strain of right upper back, initial encounter  S29.012A       3. Mucopurulent chronic bronchitis (H)  J41.1          Plan:  Do not recommend increasing his prednisone dose for treatment of muscle strain.  May continue with over-the-counter pain relievers as needed.  Prescription provided for tramadol 50 mg 1 pill every 6 hours as needed for severe pain.  May want to consider over-the-counter cough suppressant.  He will be contacted with radiology interpretation once available.  Follow-up if not improving in the next 1-2 weeks, sooner if worsening.    Subjective   Leobardo is a 63 year old, presenting for the following health issues:  Back Pain (Right low side of back/flank)      Patient is seen today for right posterior rib cage and mid back injury which occurred 3 days ago whenever he was getting a battery charger off the shelf.  He was reaching and in a twisted position when he coughed and sneezed at the same time.  He felt a sharp acute pain at the right lower rib cage.  He has been needing to wear a back brace to support the rib cage.  He has chronic mucopurulent bronchitis and takes prednisone regularly usually at least a couple times weekly.  He has been on prednisone for years.  He is not feeling more short of breath.  No hemoptysis.  He is taken acetaminophen 500 mg 2 pills once or twice daily and Advil 2 pills once or twice a daily in addition to taking prednisone daily for the past 3 days.  He wanted to be checked to make sure that he does not run out of his prednisone since he is taking it more regularly due to the current pain.    History of Present Illness       Back Pain:  He presents for follow up of back pain. Patient's back pain is a new problem.    Original cause of back pain: other  First noticed back pain: in the last week  Patient feels back pain:  constantlyLocation of back pain:  Right side of waist and right middle of back  Description of back pain: burning, sharp, shooting and stabbing  Back pain spreads: nowhere    Since patient first noticed back pain, pain is: gradually worsening  Does back pain interfere with his job:  Yes  On a scale of 1-10 (10 being the worst), patient describes pain as:  10  What makes back pain worse: bending, coughing, lying down, sitting, standing and twisting   Acupuncture: not tried  Acetaminophen: helpful  Activity or exercise: not tried  Chiropractor:  Not tried  Cold: not tried  Heat: not tried  Massage: not tried  Muscle relaxants: not tried  NSAIDS: helpful  Opioids: not tried  Physical Therapy: not tried  Rest: not helpful  Steroid Injection: not tried  Stretching: not tried  Surgery: not tried  TENS unit: not tried  Topical pain relievers: not tried  Other healthcare providers patient is seeing for back pain: None    Reason for visit:  Pain in side  Symptom onset:  1-3 days ago  Symptoms include:  Pain  Symptom intensity:  Severe  Symptom progression:  Worsening  Had these symptoms before:  No  What makes it worse:  Cough sneeze  What makes it better:  Med    He eats 0-1 servings of fruits and vegetables daily.He consumes 2 sweetened beverage(s) daily.He exercises with enough effort to increase his heart rate 9 or less minutes per day.  He exercises with enough effort to increase his heart rate 4 days per week. He is missing 1 dose(s) of medications per week.  He is not taking prescribed medications regularly due to remembering to take.                 Review of Systems   Constitutional:  Negative for chills and fever.   Respiratory:  Positive for cough. Negative for shortness of breath.         Chronic bronchitis/asthma, takes prednisone usually couple of times weekly for many years   Musculoskeletal:  Positive for back pain.        Right lower posterior rib cage and right mid back            Objective    BP (!) 134/91  "(BP Location: Right arm, Patient Position: Sitting, Cuff Size: Adult Large)   Pulse 82   Temp 97.4  F (36.3  C) (Temporal)   Resp 19   Ht 1.778 m (5' 10\")   Wt 102.6 kg (226 lb 3.2 oz)   SpO2 92%   BMI 32.46 kg/m    Body mass index is 32.46 kg/m .  Physical Exam   Pleasant gentleman who is uncomfortable with moving and coughing.  Lung fields with chronic rhonchi.  Cardiovascular regular.  Tenderness with palpation over the right mid to lower lateral rib cage.  No right CVA tenderness.  No pain over thoracic or lumbar processes.    XR right rib and chest with no acute fractures, pneumothorax or infiltrates personally reviewed and discussed with patient.                      "

## 2024-01-15 ENCOUNTER — OFFICE VISIT (OUTPATIENT)
Dept: FAMILY MEDICINE | Facility: OTHER | Age: 64
DRG: 193 | End: 2024-01-15
Attending: PHYSICIAN ASSISTANT
Payer: MEDICARE

## 2024-01-15 VITALS
BODY MASS INDEX: 31.92 KG/M2 | HEART RATE: 88 BPM | RESPIRATION RATE: 20 BRPM | OXYGEN SATURATION: 94 % | SYSTOLIC BLOOD PRESSURE: 118 MMHG | WEIGHT: 223 LBS | DIASTOLIC BLOOD PRESSURE: 78 MMHG | HEIGHT: 70 IN | TEMPERATURE: 97.1 F

## 2024-01-15 DIAGNOSIS — S29.9XXA TRAUMATIC INJURY OF RIB: ICD-10-CM

## 2024-01-15 PROCEDURE — 99213 OFFICE O/P EST LOW 20 MIN: CPT | Performed by: PHYSICIAN ASSISTANT

## 2024-01-15 PROCEDURE — G0463 HOSPITAL OUTPT CLINIC VISIT: HCPCS

## 2024-01-15 PROCEDURE — G0463 HOSPITAL OUTPT CLINIC VISIT: HCPCS | Mod: 25

## 2024-01-15 RX ORDER — TRAMADOL HYDROCHLORIDE 50 MG/1
50 TABLET ORAL EVERY 6 HOURS PRN
Qty: 10 TABLET | Refills: 0 | Status: SHIPPED | OUTPATIENT
Start: 2024-01-15 | End: 2024-05-06

## 2024-01-15 ASSESSMENT — PAIN SCALES - GENERAL: PAINLEVEL: EXTREME PAIN (8)

## 2024-01-15 NOTE — PATIENT INSTRUCTIONS
Please refer to your AVS for follow up and pain/symptoms management recommendations (I.e.: medications, helpful conservative treatment modalities, appropriate follow up if need to a specialist or family practice, etc.). Please return to urgent care if your symptoms change or worsen.     Discharge instructions:  -If you were prescribed a medication(s), please take this as prescribed/directed  -Monitor your symptoms, if changing/worsening, return to UC/ER or PCP for follow up    For pain control - we recommend alternating Tylenol and Ibuprofen if you are able to take these medications. Alternate every 4 hours as needed. I.e.: Ibuprofen at 8am, Tylenol 12pm, Ibuprofen 4pm    -Daily maximum of Tylenol is 4000mg (recommend staying under 3000mg)   -Daily maximum of Ibuprofen is 3200 mg  - Heat, ice, gentle stretching (among other remedies: biofreeze/icy hot, etc).

## 2024-01-15 NOTE — NURSING NOTE
"Patient presents to the clinic for discoloration of the right abd today.    FOOD SECURITY SCREENING QUESTIONS:    The next two questions are to help us understand your food security.  If you are feeling you need any assistance in this area, we have resources available to support you today.    Hunger Vital Signs:  Within the past 12 months we worried whether our food would run out before we got money to buy more. Never  Within the past 12 months the food we bought just didn't last and we didn't have money to get more. Never    Advance Care Directive on file? no  Advance Care Directive provided to patient? Declined.      Chief Complaint   Patient presents with    Musculoskeletal Problem       Initial /78 (BP Location: Right arm, Patient Position: Sitting, Cuff Size: Adult Large)   Pulse 88   Temp 97.1  F (36.2  C) (Tympanic)   Resp 20   Ht 1.778 m (5' 10\")   Wt 101.2 kg (223 lb)   SpO2 94%   BMI 32.00 kg/m   Estimated body mass index is 32 kg/m  as calculated from the following:    Height as of this encounter: 1.778 m (5' 10\").    Weight as of this encounter: 101.2 kg (223 lb).  Medication Reconciliation: complete        Radha Romero LPN     "

## 2024-01-15 NOTE — PROGRESS NOTES
"  Assessment & Plan       ICD-10-CM    1. Traumatic injury of rib- posterior right lower  S29.9XXA traMADol (ULTRAM) 50 MG tablet        Traumatic rib injury, I am in agreement with office note from 1/11/2024, I do not recommend he increase his prednisone for the treatment of a muscular strain.  In the interim I recommend he continue Tylenol (500 to 1000 mg) and ibuprofen (400 to 800 mg) by mouth twice daily as needed for pain.  I also provided him a short-term refill on his tramadol to take sparingly, he will not drive or operate heavy machinery.  Continue with over-the-counter cough suppressant as needed.  In regards to his ecchymosis, I believe this is due to how tight the back brace was on his abdomen and is greater to strenuous pressure and when he coughed this caused rupture of micro blood vessels.  There is no evidence of hernia or other abnormalities.  Strict return precautions reviewed. Patient is in agreement and understanding of the above treatment plan. All questions and concerns were addressed and answered to patient's satisfaction. AVS reviewed with patient.      BMI:   Estimated body mass index is 32 kg/m  as calculated from the following:    Height as of this encounter: 1.778 m (5' 10\").    Weight as of this encounter: 101.2 kg (223 lb).   Weight management plan: Discussed healthy diet and exercise guidelines    See Patient Instructions    Return if symptoms worsen or fail to improve.    Christina Taveras PA-C  [facility]    Subjective   Leobardo is a 63 year old, presenting for the following health issues:  Musculoskeletal Problem        1/15/2024     1:05 PM   Additional Questions   Roomed by ray dey lpn       Musculoskeletal Problem  This is a new problem. The current episode started yesterday. The problem occurs constantly. The problem has been gradually improving.      Leobardo resents to the clinic today with a 3 to 4-day history of right-sided rib pain, this started after he was bent " "over to get a battery charger off the shelf and sneezed and coughed at the same time while in a twisted position, he had sudden onset of severe pain.  He was evaluated in the clinic on 1/11/2024, x-rays were obtained and were unremarkable, he was diagnosed with a strain and sent home with a prescription for tramadol.  He has been taking this very sparingly.  He has been taking 2 Tylenol and ibuprofen tablets twice daily along with 1-2 tramadol daily as needed.  He has been wearing a back support brace which is helpful, he notes this is quite tight, he took this off yesterday and noted a small amount of bruising on his right lower abdomen, when he woke up this morning the bruising had spread.  He declines any abdominal pain, fevers, chills, changes to bowel or bladder habit.  No chest pain, shortness of breath, centralized or peripheral edema.  No falls, injuries and/or trauma other than that listed above.    Morbid conditions: Chronic mucopurulent bronchitis, he is on prednisone regularly.  Declines hemoptysis.    Review of Systems   Constitutional, HEENT, cardiovascular, pulmonary, GI, , musculoskeletal, neuro, skin, endocrine and psych systems are negative, except as otherwise noted.        Objective    /78 (BP Location: Right arm, Patient Position: Sitting, Cuff Size: Adult Large)   Pulse 88   Temp 97.1  F (36.2  C) (Tympanic)   Resp 20   Ht 1.778 m (5' 10\")   Wt 101.2 kg (223 lb)   SpO2 94%   BMI 32.00 kg/m    Body mass index is 32 kg/m .  Physical Exam   GENERAL: healthy, alert and no distress  EYES: Eyes grossly normal to inspection, PERRL and conjunctivae and sclerae normal  NECK: no adenopathy, no asymmetry, masses, or scars and thyroid normal to palpation  RESP: lungs clear to auscultation - no rales, rhonchi or wheezes  CV: regular rate and rhythm, normal S1 S2, no S3 or S4, no murmur, click or rub, no peripheral edema and peripheral pulses strong  ABDOMEN: soft, nontender, without " hepatosplenomegaly or masses, bowel sounds normal, no palpable or pulsatile masses, no bruits heard, and no hernia (inguinal, etc.).   MS: no gross musculoskeletal defects noted, no edema  SKIN: ecchymosis to right lower abdomen, 3 inches x 2 inches x superficial depth.   PSYCH: mentation appears normal, affect normal/bright

## 2024-01-16 ENCOUNTER — HOSPITAL ENCOUNTER (INPATIENT)
Facility: OTHER | Age: 64
LOS: 1 days | Discharge: HOME | DRG: 193 | End: 2024-01-17
Attending: FAMILY MEDICINE | Admitting: INTERNAL MEDICINE
Payer: MEDICARE

## 2024-01-16 ENCOUNTER — APPOINTMENT (OUTPATIENT)
Dept: GENERAL RADIOLOGY | Facility: OTHER | Age: 64
DRG: 193 | End: 2024-01-16
Attending: FAMILY MEDICINE
Payer: MEDICARE

## 2024-01-16 ENCOUNTER — APPOINTMENT (OUTPATIENT)
Dept: CT IMAGING | Facility: OTHER | Age: 64
DRG: 193 | End: 2024-01-16
Attending: FAMILY MEDICINE
Payer: MEDICARE

## 2024-01-16 DIAGNOSIS — J44.1 CHRONIC OBSTRUCTIVE PULMONARY DISEASE WITH ACUTE EXACERBATION (H): Primary | ICD-10-CM

## 2024-01-16 DIAGNOSIS — J96.01 ACUTE RESPIRATORY FAILURE WITH HYPOXIA (H): ICD-10-CM

## 2024-01-16 DIAGNOSIS — S22.31XA CLOSED FRACTURE OF ONE RIB OF RIGHT SIDE, INITIAL ENCOUNTER: ICD-10-CM

## 2024-01-16 DIAGNOSIS — R07.89 RIGHT-SIDED CHEST WALL PAIN: ICD-10-CM

## 2024-01-16 DIAGNOSIS — J18.9 PNEUMONIA OF RIGHT LOWER LOBE DUE TO INFECTIOUS ORGANISM: ICD-10-CM

## 2024-01-16 PROBLEM — I71.40 ABDOMINAL AORTIC ANEURYSM (AAA) WITHOUT RUPTURE, UNSPECIFIED PART (H): Status: ACTIVE | Noted: 2024-01-16

## 2024-01-16 LAB
ALLEN'S TEST: NO
ANION GAP SERPL CALCULATED.3IONS-SCNC: 11 MMOL/L (ref 7–15)
ATRIAL RATE - MUSE: 82 BPM
BASE EXCESS BLDA CALC-SCNC: 0 MMOL/L (ref -9–1.8)
BASOPHILS # BLD AUTO: 0.1 10E3/UL (ref 0–0.2)
BASOPHILS NFR BLD AUTO: 1 %
BUN SERPL-MCNC: 17 MG/DL (ref 8–23)
CALCIUM SERPL-MCNC: 9.5 MG/DL (ref 8.8–10.2)
CHLORIDE SERPL-SCNC: 103 MMOL/L (ref 98–107)
CREAT SERPL-MCNC: 0.89 MG/DL (ref 0.67–1.17)
CREAT SERPL-MCNC: 0.91 MG/DL (ref 0.67–1.17)
DEPRECATED HCO3 PLAS-SCNC: 27 MMOL/L (ref 22–29)
DIASTOLIC BLOOD PRESSURE - MUSE: NORMAL MMHG
EGFRCR SERPLBLD CKD-EPI 2021: >90 ML/MIN/1.73M2
EGFRCR SERPLBLD CKD-EPI 2021: >90 ML/MIN/1.73M2
EOSINOPHIL # BLD AUTO: 0.4 10E3/UL (ref 0–0.7)
EOSINOPHIL NFR BLD AUTO: 4 %
ERYTHROCYTE [DISTWIDTH] IN BLOOD BY AUTOMATED COUNT: 13.3 % (ref 10–15)
GLUCOSE SERPL-MCNC: 120 MG/DL (ref 70–99)
HCO3 BLD-SCNC: 27 MMOL/L (ref 21–28)
HCT VFR BLD AUTO: 45.1 % (ref 40–53)
HGB BLD-MCNC: 14.4 G/DL (ref 13.3–17.7)
HOLD SPECIMEN: NORMAL
IMM GRANULOCYTES # BLD: 0.1 10E3/UL
IMM GRANULOCYTES NFR BLD: 1 %
INR PPP: 1.08 (ref 0.85–1.15)
INTERPRETATION ECG - MUSE: NORMAL
LYMPHOCYTES # BLD AUTO: 4 10E3/UL (ref 0.8–5.3)
LYMPHOCYTES NFR BLD AUTO: 40 %
MAGNESIUM SERPL-MCNC: 1.9 MG/DL (ref 1.7–2.3)
MCH RBC QN AUTO: 31.4 PG (ref 26.5–33)
MCHC RBC AUTO-ENTMCNC: 31.9 G/DL (ref 31.5–36.5)
MCV RBC AUTO: 99 FL (ref 78–100)
MONOCYTES # BLD AUTO: 0.8 10E3/UL (ref 0–1.3)
MONOCYTES NFR BLD AUTO: 8 %
NEUTROPHILS # BLD AUTO: 4.8 10E3/UL (ref 1.6–8.3)
NEUTROPHILS NFR BLD AUTO: 46 %
NRBC # BLD AUTO: 0 10E3/UL
NRBC BLD AUTO-RTO: 0 /100
O2/TOTAL GAS SETTING VFR VENT: 40 %
OXYHGB MFR BLD: 95 % (ref 92–100)
P AXIS - MUSE: 18 DEGREES
PCO2 BLD: 49 MM HG (ref 35–45)
PH BLD: 7.34 [PH] (ref 7.35–7.45)
PLATELET # BLD AUTO: 290 10E3/UL (ref 150–450)
PO2 BLD: 86 MM HG (ref 80–105)
POTASSIUM SERPL-SCNC: 4.6 MMOL/L (ref 3.4–5.3)
PR INTERVAL - MUSE: 148 MS
QRS DURATION - MUSE: 80 MS
QT - MUSE: 366 MS
QTC - MUSE: 427 MS
R AXIS - MUSE: -27 DEGREES
RBC # BLD AUTO: 4.58 10E6/UL (ref 4.4–5.9)
SARS-COV-2 RNA RESP QL NAA+PROBE: NEGATIVE
SODIUM SERPL-SCNC: 141 MMOL/L (ref 135–145)
SYSTOLIC BLOOD PRESSURE - MUSE: NORMAL MMHG
T AXIS - MUSE: 5 DEGREES
TROPONIN T SERPL HS-MCNC: 11 NG/L
VENTRICULAR RATE- MUSE: 82 BPM
WBC # BLD AUTO: 10.1 10E3/UL (ref 4–11)

## 2024-01-16 PROCEDURE — 250N000011 HC RX IP 250 OP 636: Performed by: INTERNAL MEDICINE

## 2024-01-16 PROCEDURE — 96365 THER/PROPH/DIAG IV INF INIT: CPT | Mod: XU | Performed by: FAMILY MEDICINE

## 2024-01-16 PROCEDURE — 99285 EMERGENCY DEPT VISIT HI MDM: CPT | Performed by: FAMILY MEDICINE

## 2024-01-16 PROCEDURE — 85610 PROTHROMBIN TIME: CPT | Performed by: FAMILY MEDICINE

## 2024-01-16 PROCEDURE — 250N000011 HC RX IP 250 OP 636: Performed by: FAMILY MEDICINE

## 2024-01-16 PROCEDURE — 85025 COMPLETE CBC W/AUTO DIFF WBC: CPT | Performed by: FAMILY MEDICINE

## 2024-01-16 PROCEDURE — 120N000001 HC R&B MED SURG/OB

## 2024-01-16 PROCEDURE — 96375 TX/PRO/DX INJ NEW DRUG ADDON: CPT | Mod: XU | Performed by: FAMILY MEDICINE

## 2024-01-16 PROCEDURE — 96376 TX/PRO/DX INJ SAME DRUG ADON: CPT | Mod: XU | Performed by: FAMILY MEDICINE

## 2024-01-16 PROCEDURE — 250N000013 HC RX MED GY IP 250 OP 250 PS 637: Performed by: INTERNAL MEDICINE

## 2024-01-16 PROCEDURE — 36600 WITHDRAWAL OF ARTERIAL BLOOD: CPT | Performed by: FAMILY MEDICINE

## 2024-01-16 PROCEDURE — 71045 X-RAY EXAM CHEST 1 VIEW: CPT | Mod: TC

## 2024-01-16 PROCEDURE — 71260 CT THORAX DX C+: CPT | Mod: MA

## 2024-01-16 PROCEDURE — 258N000003 HC RX IP 258 OP 636: Performed by: FAMILY MEDICINE

## 2024-01-16 PROCEDURE — 94799 UNLISTED PULMONARY SVC/PX: CPT

## 2024-01-16 PROCEDURE — 999N000157 HC STATISTIC RCP TIME EA 10 MIN

## 2024-01-16 PROCEDURE — 94660 CPAP INITIATION&MGMT: CPT

## 2024-01-16 PROCEDURE — 94640 AIRWAY INHALATION TREATMENT: CPT

## 2024-01-16 PROCEDURE — 250N000009 HC RX 250: Performed by: FAMILY MEDICINE

## 2024-01-16 PROCEDURE — 83735 ASSAY OF MAGNESIUM: CPT | Performed by: INTERNAL MEDICINE

## 2024-01-16 PROCEDURE — 93010 ELECTROCARDIOGRAM REPORT: CPT | Performed by: STUDENT IN AN ORGANIZED HEALTH CARE EDUCATION/TRAINING PROGRAM

## 2024-01-16 PROCEDURE — 87635 SARS-COV-2 COVID-19 AMP PRB: CPT | Performed by: INTERNAL MEDICINE

## 2024-01-16 PROCEDURE — 36415 COLL VENOUS BLD VENIPUNCTURE: CPT | Performed by: FAMILY MEDICINE

## 2024-01-16 PROCEDURE — 84484 ASSAY OF TROPONIN QUANT: CPT | Performed by: FAMILY MEDICINE

## 2024-01-16 PROCEDURE — 93005 ELECTROCARDIOGRAM TRACING: CPT | Performed by: FAMILY MEDICINE

## 2024-01-16 PROCEDURE — 99223 1ST HOSP IP/OBS HIGH 75: CPT | Mod: AI | Performed by: INTERNAL MEDICINE

## 2024-01-16 PROCEDURE — 82805 BLOOD GASES W/O2 SATURATION: CPT | Performed by: FAMILY MEDICINE

## 2024-01-16 PROCEDURE — 99285 EMERGENCY DEPT VISIT HI MDM: CPT | Mod: 25 | Performed by: FAMILY MEDICINE

## 2024-01-16 PROCEDURE — 80048 BASIC METABOLIC PNL TOTAL CA: CPT | Performed by: FAMILY MEDICINE

## 2024-01-16 RX ORDER — AZITHROMYCIN 500 MG/5ML
500 INJECTION, POWDER, LYOPHILIZED, FOR SOLUTION INTRAVENOUS EVERY 24 HOURS
Status: DISCONTINUED | OUTPATIENT
Start: 2024-01-16 | End: 2024-01-16 | Stop reason: DRUGHIGH

## 2024-01-16 RX ORDER — MULTIVITAMIN
1 TABLET ORAL DAILY
COMMUNITY

## 2024-01-16 RX ORDER — NALOXONE HYDROCHLORIDE 0.4 MG/ML
0.4 INJECTION, SOLUTION INTRAMUSCULAR; INTRAVENOUS; SUBCUTANEOUS
Status: DISCONTINUED | OUTPATIENT
Start: 2024-01-16 | End: 2024-01-17 | Stop reason: HOSPADM

## 2024-01-16 RX ORDER — FLUTICASONE FUROATE AND VILANTEROL 100; 25 UG/1; UG/1
1 POWDER RESPIRATORY (INHALATION) DAILY
Status: DISCONTINUED | OUTPATIENT
Start: 2024-01-16 | End: 2024-01-17 | Stop reason: HOSPADM

## 2024-01-16 RX ORDER — DIPHENHYDRAMINE HCL 50 MG
50 CAPSULE ORAL EVERY 6 HOURS PRN
COMMUNITY

## 2024-01-16 RX ORDER — AMOXICILLIN 250 MG
1 CAPSULE ORAL 2 TIMES DAILY PRN
Status: DISCONTINUED | OUTPATIENT
Start: 2024-01-16 | End: 2024-01-17 | Stop reason: HOSPADM

## 2024-01-16 RX ORDER — AMOXICILLIN 250 MG
2 CAPSULE ORAL 2 TIMES DAILY PRN
Status: DISCONTINUED | OUTPATIENT
Start: 2024-01-16 | End: 2024-01-17 | Stop reason: HOSPADM

## 2024-01-16 RX ORDER — PREDNISONE 10 MG/1
10-20 TABLET ORAL PRN
COMMUNITY
End: 2024-01-25

## 2024-01-16 RX ORDER — LIDOCAINE 40 MG/G
CREAM TOPICAL
Status: DISCONTINUED | OUTPATIENT
Start: 2024-01-16 | End: 2024-01-17 | Stop reason: HOSPADM

## 2024-01-16 RX ORDER — METHYLPREDNISOLONE SODIUM SUCCINATE 125 MG/2ML
60 INJECTION, POWDER, LYOPHILIZED, FOR SOLUTION INTRAMUSCULAR; INTRAVENOUS ONCE
Status: COMPLETED | OUTPATIENT
Start: 2024-01-16 | End: 2024-01-16

## 2024-01-16 RX ORDER — CEFTRIAXONE 2 G/1
2 INJECTION, POWDER, FOR SOLUTION INTRAMUSCULAR; INTRAVENOUS ONCE
Status: COMPLETED | OUTPATIENT
Start: 2024-01-16 | End: 2024-01-16

## 2024-01-16 RX ORDER — ALBUTEROL SULFATE 90 UG/1
2 AEROSOL, METERED RESPIRATORY (INHALATION) EVERY 6 HOURS PRN
Status: DISCONTINUED | OUTPATIENT
Start: 2024-01-16 | End: 2024-01-16

## 2024-01-16 RX ORDER — LORAZEPAM 2 MG/ML
1 INJECTION INTRAMUSCULAR ONCE
Status: COMPLETED | OUTPATIENT
Start: 2024-01-16 | End: 2024-01-16

## 2024-01-16 RX ORDER — HYDROMORPHONE HYDROCHLORIDE 1 MG/ML
0.5 INJECTION, SOLUTION INTRAMUSCULAR; INTRAVENOUS; SUBCUTANEOUS EVERY 30 MIN PRN
Status: COMPLETED | OUTPATIENT
Start: 2024-01-16 | End: 2024-01-16

## 2024-01-16 RX ORDER — IPRATROPIUM BROMIDE AND ALBUTEROL SULFATE 2.5; .5 MG/3ML; MG/3ML
3 SOLUTION RESPIRATORY (INHALATION) ONCE
Status: COMPLETED | OUTPATIENT
Start: 2024-01-16 | End: 2024-01-16

## 2024-01-16 RX ORDER — CALCIUM CARBONATE 500 MG/1
1000 TABLET, CHEWABLE ORAL 4 TIMES DAILY PRN
Status: DISCONTINUED | OUTPATIENT
Start: 2024-01-16 | End: 2024-01-17 | Stop reason: HOSPADM

## 2024-01-16 RX ORDER — HYDROMORPHONE HCL IN WATER/PF 6 MG/30 ML
0.2 PATIENT CONTROLLED ANALGESIA SYRINGE INTRAVENOUS
Status: DISCONTINUED | OUTPATIENT
Start: 2024-01-16 | End: 2024-01-17 | Stop reason: HOSPADM

## 2024-01-16 RX ORDER — IBUPROFEN 200 MG
400 TABLET ORAL 2 TIMES DAILY
COMMUNITY
End: 2024-09-25

## 2024-01-16 RX ORDER — ACETAMINOPHEN 325 MG/1
650 TABLET ORAL 2 TIMES DAILY
Status: DISCONTINUED | OUTPATIENT
Start: 2024-01-16 | End: 2024-01-17 | Stop reason: HOSPADM

## 2024-01-16 RX ORDER — ACETAMINOPHEN 325 MG/1
650 TABLET ORAL EVERY 4 HOURS PRN
Status: DISCONTINUED | OUTPATIENT
Start: 2024-01-16 | End: 2024-01-17 | Stop reason: HOSPADM

## 2024-01-16 RX ORDER — ACETAMINOPHEN 650 MG/1
650 SUPPOSITORY RECTAL EVERY 4 HOURS PRN
Status: DISCONTINUED | OUTPATIENT
Start: 2024-01-16 | End: 2024-01-17 | Stop reason: HOSPADM

## 2024-01-16 RX ORDER — NALOXONE HYDROCHLORIDE 0.4 MG/ML
0.2 INJECTION, SOLUTION INTRAMUSCULAR; INTRAVENOUS; SUBCUTANEOUS
Status: DISCONTINUED | OUTPATIENT
Start: 2024-01-16 | End: 2024-01-17 | Stop reason: HOSPADM

## 2024-01-16 RX ORDER — ONDANSETRON 2 MG/ML
4 INJECTION INTRAMUSCULAR; INTRAVENOUS EVERY 6 HOURS PRN
Status: DISCONTINUED | OUTPATIENT
Start: 2024-01-16 | End: 2024-01-17 | Stop reason: HOSPADM

## 2024-01-16 RX ORDER — MULTIVITAMIN
1 TABLET ORAL DAILY
Status: DISCONTINUED | OUTPATIENT
Start: 2024-01-16 | End: 2024-01-16

## 2024-01-16 RX ORDER — VITAMIN B COMPLEX
25 TABLET ORAL DAILY
Status: DISCONTINUED | OUTPATIENT
Start: 2024-01-16 | End: 2024-01-17 | Stop reason: HOSPADM

## 2024-01-16 RX ORDER — ASCORBIC ACID 500 MG
500 TABLET ORAL DAILY
Status: DISCONTINUED | OUTPATIENT
Start: 2024-01-16 | End: 2024-01-17 | Stop reason: HOSPADM

## 2024-01-16 RX ORDER — METHYLPREDNISOLONE SODIUM SUCCINATE 125 MG/2ML
60 INJECTION, POWDER, LYOPHILIZED, FOR SOLUTION INTRAMUSCULAR; INTRAVENOUS EVERY 8 HOURS
Status: DISCONTINUED | OUTPATIENT
Start: 2024-01-16 | End: 2024-01-17 | Stop reason: HOSPADM

## 2024-01-16 RX ORDER — CEFTRIAXONE 2 G/1
2 INJECTION, POWDER, FOR SOLUTION INTRAMUSCULAR; INTRAVENOUS EVERY 24 HOURS
Qty: 140 ML | Refills: 0 | Status: DISCONTINUED | OUTPATIENT
Start: 2024-01-17 | End: 2024-01-17 | Stop reason: HOSPADM

## 2024-01-16 RX ORDER — HYDROMORPHONE HCL IN WATER/PF 6 MG/30 ML
0.4 PATIENT CONTROLLED ANALGESIA SYRINGE INTRAVENOUS
Status: DISCONTINUED | OUTPATIENT
Start: 2024-01-16 | End: 2024-01-17 | Stop reason: HOSPADM

## 2024-01-16 RX ORDER — ACETAMINOPHEN 325 MG/1
650 TABLET ORAL 2 TIMES DAILY
COMMUNITY

## 2024-01-16 RX ORDER — MULTIPLE VITAMINS W/ MINERALS TAB 9MG-400MCG
1 TAB ORAL DAILY
Status: DISCONTINUED | OUTPATIENT
Start: 2024-01-17 | End: 2024-01-17 | Stop reason: HOSPADM

## 2024-01-16 RX ORDER — KETOROLAC TROMETHAMINE 15 MG/ML
15 INJECTION, SOLUTION INTRAMUSCULAR; INTRAVENOUS ONCE
Status: COMPLETED | OUTPATIENT
Start: 2024-01-16 | End: 2024-01-16

## 2024-01-16 RX ORDER — SODIUM CHLORIDE 9 MG/ML
INJECTION, SOLUTION INTRAVENOUS CONTINUOUS
Status: DISCONTINUED | OUTPATIENT
Start: 2024-01-16 | End: 2024-01-17 | Stop reason: HOSPADM

## 2024-01-16 RX ORDER — ONDANSETRON 4 MG/1
4 TABLET, ORALLY DISINTEGRATING ORAL EVERY 6 HOURS PRN
Status: DISCONTINUED | OUTPATIENT
Start: 2024-01-16 | End: 2024-01-17 | Stop reason: HOSPADM

## 2024-01-16 RX ORDER — OXYCODONE HYDROCHLORIDE 5 MG/1
5 TABLET ORAL EVERY 4 HOURS PRN
Status: DISCONTINUED | OUTPATIENT
Start: 2024-01-16 | End: 2024-01-17 | Stop reason: HOSPADM

## 2024-01-16 RX ORDER — OXYCODONE HYDROCHLORIDE 5 MG/1
5 TABLET ORAL EVERY 4 HOURS PRN
Status: DISCONTINUED | OUTPATIENT
Start: 2024-01-16 | End: 2024-01-16 | Stop reason: DRUGHIGH

## 2024-01-16 RX ORDER — ONDANSETRON 2 MG/ML
4 INJECTION INTRAMUSCULAR; INTRAVENOUS
Status: DISCONTINUED | OUTPATIENT
Start: 2024-01-16 | End: 2024-01-16 | Stop reason: DRUGHIGH

## 2024-01-16 RX ORDER — ENOXAPARIN SODIUM 100 MG/ML
40 INJECTION SUBCUTANEOUS EVERY 24 HOURS
Status: DISCONTINUED | OUTPATIENT
Start: 2024-01-16 | End: 2024-01-17 | Stop reason: HOSPADM

## 2024-01-16 RX ORDER — ALBUTEROL SULFATE 0.83 MG/ML
3 SOLUTION RESPIRATORY (INHALATION)
Status: DISCONTINUED | OUTPATIENT
Start: 2024-01-16 | End: 2024-01-17 | Stop reason: HOSPADM

## 2024-01-16 RX ORDER — IOPAMIDOL 755 MG/ML
128 INJECTION, SOLUTION INTRAVASCULAR ONCE
Status: COMPLETED | OUTPATIENT
Start: 2024-01-16 | End: 2024-01-16

## 2024-01-16 RX ORDER — IBUPROFEN 400 MG/1
400 TABLET, FILM COATED ORAL 2 TIMES DAILY
Status: DISCONTINUED | OUTPATIENT
Start: 2024-01-16 | End: 2024-01-17 | Stop reason: HOSPADM

## 2024-01-16 RX ADMIN — ENOXAPARIN SODIUM 40 MG: 40 INJECTION SUBCUTANEOUS at 21:36

## 2024-01-16 RX ADMIN — CEFTRIAXONE 2 G: 2 INJECTION, POWDER, FOR SOLUTION INTRAMUSCULAR; INTRAVENOUS at 09:55

## 2024-01-16 RX ADMIN — SODIUM CHLORIDE: 9 INJECTION, SOLUTION INTRAVENOUS at 09:55

## 2024-01-16 RX ADMIN — ACETAMINOPHEN 650 MG: 325 TABLET, FILM COATED ORAL at 14:00

## 2024-01-16 RX ADMIN — SODIUM CHLORIDE: 9 INJECTION, SOLUTION INTRAVENOUS at 20:56

## 2024-01-16 RX ADMIN — IOPAMIDOL 128 ML: 755 INJECTION, SOLUTION INTRAVENOUS at 08:16

## 2024-01-16 RX ADMIN — SODIUM CHLORIDE: 9 INJECTION, SOLUTION INTRAVENOUS at 12:52

## 2024-01-16 RX ADMIN — METHYLPREDNISOLONE SODIUM SUCCINATE 62.5 MG: 125 INJECTION, POWDER, FOR SOLUTION INTRAMUSCULAR; INTRAVENOUS at 18:06

## 2024-01-16 RX ADMIN — IBUPROFEN 400 MG: 400 TABLET, FILM COATED ORAL at 14:00

## 2024-01-16 RX ADMIN — OXYCODONE HYDROCHLORIDE 5 MG: 5 TABLET ORAL at 15:32

## 2024-01-16 RX ADMIN — OXYCODONE HYDROCHLORIDE AND ACETAMINOPHEN 500 MG: 500 TABLET ORAL at 14:01

## 2024-01-16 RX ADMIN — AZITHROMYCIN MONOHYDRATE 500 MG: 500 INJECTION, POWDER, LYOPHILIZED, FOR SOLUTION INTRAVENOUS at 10:27

## 2024-01-16 RX ADMIN — HYDROMORPHONE HYDROCHLORIDE 0.5 MG: 1 INJECTION, SOLUTION INTRAMUSCULAR; INTRAVENOUS; SUBCUTANEOUS at 09:12

## 2024-01-16 RX ADMIN — LORAZEPAM 1 MG: 2 INJECTION INTRAMUSCULAR; INTRAVENOUS at 07:16

## 2024-01-16 RX ADMIN — ACETAMINOPHEN 650 MG: 325 TABLET, FILM COATED ORAL at 21:36

## 2024-01-16 RX ADMIN — IBUPROFEN 400 MG: 400 TABLET, FILM COATED ORAL at 21:36

## 2024-01-16 RX ADMIN — HYDROMORPHONE HYDROCHLORIDE 0.5 MG: 1 INJECTION, SOLUTION INTRAMUSCULAR; INTRAVENOUS; SUBCUTANEOUS at 11:28

## 2024-01-16 RX ADMIN — KETOROLAC TROMETHAMINE 15 MG: 15 INJECTION, SOLUTION INTRAMUSCULAR; INTRAVENOUS at 07:10

## 2024-01-16 RX ADMIN — HYDROMORPHONE HYDROCHLORIDE 1 MG: 1 INJECTION, SOLUTION INTRAMUSCULAR; INTRAVENOUS; SUBCUTANEOUS at 06:51

## 2024-01-16 RX ADMIN — HYDROMORPHONE HYDROCHLORIDE 0.5 MG: 1 INJECTION, SOLUTION INTRAMUSCULAR; INTRAVENOUS; SUBCUTANEOUS at 12:07

## 2024-01-16 RX ADMIN — FLUTICASONE FUROATE AND VILANTEROL TRIFENATATE 1 PUFF: 100; 25 POWDER RESPIRATORY (INHALATION) at 14:55

## 2024-01-16 RX ADMIN — METHYLPREDNISOLONE SODIUM SUCCINATE 62.5 MG: 125 INJECTION, POWDER, FOR SOLUTION INTRAMUSCULAR; INTRAVENOUS at 09:55

## 2024-01-16 RX ADMIN — IPRATROPIUM BROMIDE AND ALBUTEROL SULFATE 3 ML: .5; 3 SOLUTION RESPIRATORY (INHALATION) at 10:05

## 2024-01-16 RX ADMIN — Medication 25 MCG: at 14:00

## 2024-01-16 ASSESSMENT — ACTIVITIES OF DAILY LIVING (ADL)
ADLS_ACUITY_SCORE: 20
ADLS_ACUITY_SCORE: 35
ADLS_ACUITY_SCORE: 23
DRESSING/BATHING_DIFFICULTY: NO
FALL_HISTORY_WITHIN_LAST_SIX_MONTHS: NO
ADLS_ACUITY_SCORE: 21
HEARING_DIFFICULTY_OR_DEAF: NO
DIFFICULTY_COMMUNICATING: NO
WEAR_GLASSES_OR_BLIND: YES
ADLS_ACUITY_SCORE: 20
ADLS_ACUITY_SCORE: 35
DOING_ERRANDS_INDEPENDENTLY_DIFFICULTY: NO
CHANGE_IN_FUNCTIONAL_STATUS_SINCE_ONSET_OF_CURRENT_ILLNESS/INJURY: YES
TOILETING_ISSUES: NO
ADLS_ACUITY_SCORE: 23
DIFFICULTY_EATING/SWALLOWING: NO
ADLS_ACUITY_SCORE: 20
CONCENTRATING,_REMEMBERING_OR_MAKING_DECISIONS_DIFFICULTY: NO
ADLS_ACUITY_SCORE: 35
VISION_MANAGEMENT: GLASSES
WALKING_OR_CLIMBING_STAIRS_DIFFICULTY: NO

## 2024-01-16 ASSESSMENT — ENCOUNTER SYMPTOMS
ABDOMINAL PAIN: 1
FEVER: 0
CHEST TIGHTNESS: 0
SHORTNESS OF BREATH: 1
DIARRHEA: 0
WHEEZING: 1
VOMITING: 0
CHOKING: 0
COUGH: 0
NAUSEA: 0
FATIGUE: 0

## 2024-01-16 NOTE — PROGRESS NOTES
1.  Has the patient had a previous reaction to IV contrast? No    2.  Does the patient have kidney disease? No    3.  Is the patient on dialysis? No    If YES to any of these questions, exam will be reviewed with a Radiologist before administering contrast.

## 2024-01-16 NOTE — ED TRIAGE NOTES
Pt arrives to ER via [facility] with c/o difficulty breathing and R sided rib pain. Pt reports that sx started this morning.

## 2024-01-16 NOTE — PLAN OF CARE
Goal Outcome Evaluation:  VSS. A/O, pleasant. Pain currently controlled with scheduled Tylenol, ibuprofen and prn Oxycodone, as well as ice. LS clear, however diminished in the RLL. Bruising present along right anterior waistline. Up with SBA. States pain increased with movement and deep breaths as well as ZAMORA. Has IS at bedside, RT completed teaching and pt is utilizing this independently. O2 at 4 LPM via NC.  Pt hopes to be able to return home tomorrow.        Plan of Care Reviewed With: patient, spouse    Overall Patient Progress: improving

## 2024-01-16 NOTE — PROVIDER NOTIFICATION
01/16/24 1238   Valuables   Patient Belongings remains with patient   Patient Belongings Remaining with Patient clothing;vision aids;cell phone/electronics   Did you bring any home meds/supplements to the hospital?  No                    Admission:  I am responsible for any personal items that are not sent to the safe or pharmacy.  [facility] is not responsible for loss, theft or damage of any property in my possession.    Signature:  _________________________________ Date: _______  Time: _____                                              Staff Signature:  ____________________________ Date: ________  Time: _____      2nd Staff person, if patient is unable/unwilling to sign:    Signature: ________________________________ Date: ________  Time: _____     Discharge:  [facility] has returned all of my personal belongings:    Signature: _________________________________ Date: ________  Time: _____                                          Staff Signature:  ____________________________ Date: ________  Time: _____

## 2024-01-16 NOTE — H&P
"[facility]    History and Physical - Hospitalist Service       Date of Admission:  1/16/2024    Assessment & Plan      Uriel Monsalve is a 63 year old male admitted on 1/16/2024. He was admitted on 1/16 with pain control for a rib fracture and acute respiratory failure from community acquired pneumonia    Rib Fracture  - due to coughing - clearly he has bone demineralizaton from chronic steroid use  Plan:  Oral Pain control, prn dilaudid  Recommend DEXA after discharge, presumed osteoporosis    Community Acquired Pneumonia with Acute Respiratory Failure and COPD  - Infiltrate Right Base  - Appears to have COPD based on prior PFTs  Plan:  Check Covid  Start Azithro   IV steroids  RCAT    AAA  - 5.1 cm  - Referral to vascular surgery after discontinue            Diet:  Regular  DVT Prophylaxis: Enoxaparin (Lovenox) SQ  Moyer Catheter: Not present  Lines: None     Cardiac Monitoring: None  Code Status:  Full    Clinically Significant Risk Factors Present on Admission                       # Obesity: Estimated body mass index is 32 kg/m  as calculated from the following:    Height as of 1/15/24: 1.778 m (5' 10\").    Weight as of 1/15/24: 101.2 kg (223 lb).       # Asthma: noted on problem list        Disposition Plan      Expected Discharge Date: 01/18/2024                  Keaton Shah MD  Hospitalist Service  [facility]  Securely message with [employer] (more info)  Text page via [employer] Paging/Directory     ______________________________________________________________________    Chief Complaint   SOB / Rib Pain    History is obtained from the patient    History of Present Illness   Uriel Monsalve is a 63 year old male who was in his usual state of health until about 5 days ago when he started coughing, minimally productive of yellow phlegm.  He has had chills in the past 24 hours.    Yesterday he was actually seen in the ED - he was working with a battery/ at home and had a " cough/sneeze while bent over and developed acute lateral right sided intense rib pain.  He was found to have a rib fracture and sent home on pain meds.    He was however brought back to the ED today due to increased SOB and was found to have a probably RLL pneumonia as well.          Past Medical History    No past medical history on file.    Past Surgical History   Past Surgical History:   Procedure Laterality Date    COLONOSCOPY  01/25/2016    Serrated adenoma  ,F/U 2021    COLONOSCOPY N/A 09/13/2021    F/U 2026 FH colon cancer. Hyperplastic and Sigmoid diverticulosis.    ENDOSCOPIC SINUS SURGERY      No Comments Provided    EXTRACTION(S) DENTAL      No Comments Provided       Prior to Admission Medications   Prior to Admission Medications   Prescriptions Last Dose Informant Patient Reported? Taking?   Ascorbic Acid (VITAMIN C PO) 1/15/2024 at AM Self Yes Yes   Sig: Take 500 mg by mouth daily   EPINEPHrine (ANY BX GENERIC EQUIV) 0.3 MG/0.3ML injection 2-pack Unknown Self No Yes   Sig: INJECT 0.3 MLS (0.3MG) INTO THE MUSCLE AS NEEDED   acetaminophen (TYLENOL) 325 MG tablet 1/15/2024 Self Yes Yes   Sig: Take 650 mg by mouth 2 times daily   albuterol (PROAIR HFA/PROVENTIL HFA/VENTOLIN HFA) 108 (90 Base) MCG/ACT inhaler 1/16/2024 at PM Self, Other No Yes   Sig: Inhale 2 puffs into the lungs every 6 hours as needed for shortness of breath, wheezing or cough   cholecalciferol (VITAMIN D3) 25 mcg (1000 units) capsule 1/15/2024 at AM Self Yes Yes   Sig: Take 1 capsule by mouth daily   diphenhydrAMINE (BENADRYL) 50 MG capsule Past Week Self Yes Yes   Sig: Take 50 mg by mouth every 6 hours as needed for itching or allergies   fluticasone-salmeterol (ADVAIR) 250-50 MCG/ACT inhaler 1/15/2024 at AM Self, Other No Yes   Sig: Inhale 1 puff into the lungs every 12 hours   ibuprofen (ADVIL/MOTRIN) 200 MG tablet 1/15/2024 Self Yes Yes   Sig: Take 400 mg by mouth 2 times daily   ipratropium - albuterol 0.5 mg/2.5 mg/3 mL (DUONEB)  0.5-2.5 (3) MG/3ML neb solution 1/16/2024 at AM Self, Other No Yes   Sig: Take 1 vial (3 mLs) by nebulization every 6 hours as needed for shortness of breath or wheezing   multiple vitamin  tablet TABS 1/15/2024 at AM Self Yes Yes   Sig: Take 1 tablet by mouth daily   order for DME  Self No No   Sig: Equipment being ordered: Nebulizer   predniSONE (DELTASONE) 10 MG tablet 1/16/2024 at 20mg AM Self, Other Yes Yes   Sig: Take 10-20 mg by mouth as needed (Shortness of Breath)   traMADol (ULTRAM) 50 MG tablet 1/16/2024 at AM Self, Other No Yes   Sig: Take 1 tablet (50 mg) by mouth every 6 hours as needed for severe pain      Facility-Administered Medications: None        Review of Systems    The 10 point Review of Systems is negative other than noted in the HPI or here.      Physical Exam   Vital Signs: Temp: 97.2  F (36.2  C) Temp src: Tympanic BP: 97/65 Pulse: 98   Resp: 23 SpO2: 94 % O2 Device: Oxymask Oxygen Delivery: 4 LPM  Weight: 0 lbs 0 oz    Constitutional: awake, alert, cooperative, no apparent distress, and appears stated age  Eyes: Lids and lashes normal, pupils equal, round and reactive to light, extra ocular muscles intact, sclera clear, conjunctiva normal  ENT: Normocephalic, without obvious abnormality, atraumatic, sinuses nontender on palpation, external ears without lesions, oral pharynx with moist mucous membranes, tonsils without erythema or exudates, gums normal and good dentition.  Hematologic / Lymphatic: no cervical lymphadenopathy  Respiratory: no increased work of breathing, good air exchange, no retractions, and crackles right base  Cardiovascular: Normal apical impulse, regular rate and rhythm, normal S1 and S2, no S3 or S4, and no murmur noted  GI: No scars, normal bowel sounds, soft, non-distended, non-tender, no masses palpated, no hepatosplenomegally  Skin: no bruising or bleeding, normal skin color, texture, turgor, and no redness, warmth, or swelling  Musculoskeletal: There is no  redness, warmth, or swelling of the joints.  Full range of motion noted.  Motor strength is 5 out of 5 all extremities bilaterally.  Tone is normal.  Tender midline right lower lateral ribs  Neuropsychiatric: General: normal, calm, and normal eye contact    Medical Decision Making       75 MINUTES SPENT BY ME on the date of service doing chart review, history, exam, documentation & further activities per the note.      Data     I have personally reviewed the following data over the past 24 hrs:    10.1  \   14.4   / 290     141 103 17.0 /  120 (H)   4.6 27 0.89 \     Trop: 11 BNP: N/A     INR:  1.08 PTT:  N/A   D-dimer:  N/A Fibrinogen:  N/A

## 2024-01-16 NOTE — PLAN OF CARE
[facility] [place]    Patient admitted to room 318 at approximately 1220 via cart from emergency room. Patient was accompanied by nurse.     Verbal SBAR report received from Mary Lou PALACIO prior to patient arrival.     Patient ambulated to bed with stand-by assist. Patient alert and oriented X 3. Pain is controlled with current analgesics.  Medication(s) being used: narcotic analgesics including hydromorphone (Dilaudid).  . Admission vital signs: Blood pressure 121/75, pulse 89, temperature 97.4  F (36.3  C), temperature source Tympanic, resp. rate 20, SpO2 92%. Patient was oriented to plan of care, call light, bed controls, tv, telephone, bathroom, and visiting hours.     Risk Assessment    The following safety risks were identified during admission: fall. Yellow risk band applied: YES.     Skin Initial Assessment    This writer admitted this patient and completed a full skin assessment and Molina score in the Adult PCS flowsheet. Appropriate interventions initiated as needed.     Molina Risk Assessment  Sensory Perception: 4-->no impairment  Moisture: 4-->rarely moist  Activity: 4-->walks frequently  Mobility: 3-->slightly limited  Nutrition: 3-->adequate  Friction and Shear: 3-->no apparent problem  Molina Score: 21  Mattress: Standard gel/foam mattress (IsoFlex, Atmos Air, etc.)  Bed Frame: Standard width and length          An Gaona RN

## 2024-01-16 NOTE — ED PROVIDER NOTES
History     Chief Complaint   Patient presents with    Respiratory Distress    Rib Pain     HPI  Uriel Monsalve is a 63 year old male who is being seen for the third time over the weekend.  He was at home lifting a battery coughed sneezed and had immediate onset of right-sided pain.  He came in and x-rays were negative for fracture.  However he has had quite a bit of pain since that time.  He felt more short of breath like he cannot get a deep breath because of the pain.  He had some bruising on the right side of his abdomen.  I took over at shift change.  Repeat x-ray showed right lower sided airway disease.  He does have a known history of asthma.  He is not a smoker.  He has recently been on prednisone.  He was using ibuprofen with his prednisone.  No fevers.  He also used Tylenol and that did not seem to help significantly with the pain.  He was sent home with some pain medication over the weekend which seemed to help for a little bit but now his pain has gotten worse.    Allergies:  Allergies   Allergen Reactions    Seasonal        Problem List:    Patient Active Problem List    Diagnosis Date Noted    Right-sided chest wall pain 01/16/2024     Priority: Medium    Closed fracture of one rib of right side, initial encounter 01/16/2024     Priority: Medium    Pneumonia of right lower lobe due to infectious organism 01/16/2024     Priority: Medium    Abdominal aortic aneurysm (AAA) without rupture, unspecified part (H24) 01/16/2024     Priority: Medium     5.1cm      FH: colon cancer 09/13/2021     Priority: Medium    Reactive airway disease with acute exacerbation, unspecified asthma severity, unspecified whether persistent 07/30/2019     Priority: Medium    History of tobacco use 07/30/2019     Priority: Medium    Mucopurulent chronic bronchitis (H) 02/07/2019     Priority: Medium    Psychosexual dysfunction with inhibited sexual excitement 02/01/2018     Priority: Medium    Diverticulosis of sigmoid colon  2016     Priority: Medium    H/O adenomatous polyp of colon 2016     Priority: Medium    Hypercholesterolemia 2016     Priority: Medium    Hives 2016     Priority: Medium        Past Medical History:    No past medical history on file.    Past Surgical History:    Past Surgical History:   Procedure Laterality Date    COLONOSCOPY  2016    Serrated adenoma  ,F/U     COLONOSCOPY N/A 2021    F/U  FH colon cancer. Hyperplastic and Sigmoid diverticulosis.    ENDOSCOPIC SINUS SURGERY      No Comments Provided    EXTRACTION(S) DENTAL      No Comments Provided       Family History:    Family History   Problem Relation Age of Onset    Cancer Father         Cancer    Diabetes Other         Diabetes,Family history    Cancer Brother         Cancer,neck    Cancer Brother         Cancer,throat       Social History:  Marital Status:   [2]  Social History     Tobacco Use    Smoking status: Former     Packs/day: 1.00     Years: 35.00     Additional pack years: 0.00     Total pack years: 35.00     Types: Cigarettes     Quit date: 2018     Years since quittin.2    Smokeless tobacco: Never   Vaping Use    Vaping Use: Never used   Substance Use Topics    Alcohol use: No     Alcohol/week: 0.0 standard drinks of alcohol    Drug use: No     Comment: Drug use: No        Medications:    albuterol (PROAIR HFA/PROVENTIL HFA/VENTOLIN HFA) 108 (90 Base) MCG/ACT inhaler  Ascorbic Acid (VITAMIN C PO)  cholecalciferol (VITAMIN D3) 25 mcg (1000 units) capsule  EPINEPHrine (ANY BX GENERIC EQUIV) 0.3 MG/0.3ML injection 2-pack  fluticasone-salmeterol (ADVAIR) 250-50 MCG/ACT inhaler  ipratropium - albuterol 0.5 mg/2.5 mg/3 mL (DUONEB) 0.5-2.5 (3) MG/3ML neb solution  Multiple Vitamin (MULTI-VITAMINS) TABS  order for DME  predniSONE (DELTASONE) 10 MG tablet  predniSONE (DELTASONE) 20 MG tablet  traMADol (ULTRAM) 50 MG tablet          Review of Systems   Constitutional:  Negative for fatigue and  fever.   Respiratory:  Positive for shortness of breath and wheezing. Negative for cough, choking and chest tightness.    Cardiovascular:  Negative for chest pain.   Gastrointestinal:  Positive for abdominal pain. Negative for diarrhea, nausea and vomiting.   Skin:         Bruising of right side of abdomen       Physical Exam   BP: (!) 120/94  Pulse: 86  Temp: 97.2  F (36.2  C)  Resp: 24  SpO2: 98 %      Physical Exam  Constitutional:       General: He is not in acute distress.     Appearance: He is well-developed. He is not diaphoretic.   HENT:      Head: Normocephalic and atraumatic.      Nose: No congestion.      Mouth/Throat:      Mouth: Mucous membranes are moist.   Eyes:      General: No scleral icterus.     Conjunctiva/sclera: Conjunctivae normal.   Cardiovascular:      Rate and Rhythm: Normal rate.   Pulmonary:      Effort: Pulmonary effort is normal.      Breath sounds: Wheezing present.      Comments: Pain with deep inspiration  Abdominal:      General: Abdomen is flat.   Musculoskeletal:      Cervical back: Normal range of motion and neck supple.   Skin:     General: Skin is warm and dry.      Capillary Refill: Capillary refill takes less than 2 seconds.      Findings: No rash.      Comments: Bruising of right side of abdomen   Neurological:      Mental Status: He is alert and oriented to person, place, and time.         ED Course              ED Course as of 01/16/24 0947   Tue Jan 16, 2024   0711 Sign out at shift change. Came in with side/rib pain. Coughing/sneezing while moving a heavy battery over the weekend. Has bruising. Got fentanyl, dilaudid, ativan. No rib fractures seen on imaging.    0724 Will order further imaging. Pain control   0931 CT showed 10th rib fracture, aortic anuerysm     Procedures              EKG Interpretation:      Interpreted by Demi Serrano DO  Time reviewed:   Symptoms at time of EKG:    Rhythm: normal sinus   Rate: normal  Axis: normal  Ectopy: none  Conduction:  normal  ST Segments/ T Waves: No ST-T wave changes  Q Waves: none  Comparison to prior: Unchanged    Clinical Impression: normal EKG      Critical Care time:  none               Results for orders placed or performed during the hospital encounter of 01/16/24 (from the past 24 hour(s))   CBC with platelets differential    Narrative    The following orders were created for panel order CBC with platelets differential.  Procedure                               Abnormality         Status                     ---------                               -----------         ------                     CBC with platelets and d...[[id number]]                      Final result                 Please view results for these tests on the individual orders.   Basic metabolic panel   Result Value Ref Range    Sodium 141 135 - 145 mmol/L    Potassium 4.6 3.4 - 5.3 mmol/L    Chloride 103 98 - 107 mmol/L    Carbon Dioxide (CO2) 27 22 - 29 mmol/L    Anion Gap 11 7 - 15 mmol/L    Urea Nitrogen 17.0 8.0 - 23.0 mg/dL    Creatinine 0.89 0.67 - 1.17 mg/dL    GFR Estimate >90 >60 mL/min/1.73m2    Calcium 9.5 8.8 - 10.2 mg/dL    Glucose 120 (H) 70 - 99 mg/dL   CBC with platelets and differential   Result Value Ref Range    WBC Count 10.1 4.0 - 11.0 10e3/uL    RBC Count 4.58 4.40 - 5.90 10e6/uL    Hemoglobin 14.4 13.3 - 17.7 g/dL    Hematocrit 45.1 40.0 - 53.0 %    MCV 99 78 - 100 fL    MCH 31.4 26.5 - 33.0 pg    MCHC 31.9 31.5 - 36.5 g/dL    RDW 13.3 10.0 - 15.0 %    Platelet Count 290 150 - 450 10e3/uL    % Neutrophils 46 %    % Lymphocytes 40 %    % Monocytes 8 %    % Eosinophils 4 %    % Basophils 1 %    % Immature Granulocytes 1 %    NRBCs per 100 WBC 0 <1 /100    Absolute Neutrophils 4.8 1.6 - 8.3 10e3/uL    Absolute Lymphocytes 4.0 0.8 - 5.3 10e3/uL    Absolute Monocytes 0.8 0.0 - 1.3 10e3/uL    Absolute Eosinophils 0.4 0.0 - 0.7 10e3/uL    Absolute Basophils 0.1 0.0 - 0.2 10e3/uL    Absolute Immature Granulocytes 0.1 <=0.4 10e3/uL    Absolute  NRBCs 0.0 10e3/uL   [place] Draw    Narrative    The following orders were created for panel order [place] Draw.  Procedure                               Abnormality         Status                     ---------                               -----------         ------                     Extra Blue Top Tube[[id number]]                              Final result               Extra Red Top Tube[[id number]]                               Final result               Extra Green Top (Lithium...[[id number]]                      Final result                 Please view results for these tests on the individual orders.   Extra Blue Top Tube   Result Value Ref Range    Hold Specimen JIC    Extra Red Top Tube   Result Value Ref Range    Hold Specimen JIC    Extra Green Top (Lithium Heparin) Tube   Result Value Ref Range    Hold Specimen JIC    Troponin T, High Sensitivity   Result Value Ref Range    Troponin T, High Sensitivity 11 <=22 ng/L   INR   Result Value Ref Range    INR 1.08 0.85 - 1.15   Blood gas arterial and oxyhgb   Result Value Ref Range    pH Arterial 7.34 (L) 7.35 - 7.45    pCO2 Arterial 49 (H) 35 - 45 mm Hg    pO2 Arterial 86 80 - 105 mm Hg    Bicarbonate Arterial 27 21 - 28 mmol/L    Oxyhemoglobin Arterial 95 92 - 100 %    Base Excess/Deficit 0.0 -9.0 - 1.8 mmol/L    FIO2 40     Carlos Manuel's Test No    XR Chest Port 1 View    Narrative    PROCEDURE INFORMATION:   Exam: XR Chest   Exam date and time: 1/16/2024 6:45 AM   Age: 63 years old   Clinical indication: Pain; Shortness of breath; Angina pectoris; Additional   info: SOB     TECHNIQUE:   Imaging protocol: Radiologic exam of the chest.   Views: 1 view.     COMPARISON:   CR XR CHEST PORT 1 VIEW 11/27/2022 9:24 AM     FINDINGS:   Lungs: Mid inspiratory effort with resultant low lung volumes. Minimal airspace   disease at the right lung base.   Pleural spaces: Unremarkable. No pleural effusion. No pneumothorax.   Heart/Mediastinum: Unremarkable. No cardiomegaly.    Vasculature: Tortuous thoracic aorta   Bones/joints: Unremarkable.       Impression    IMPRESSION:   1.   Minimal airspace disease at the right lung base.   2.   Followup radiographs recommended after appropriate therapy.     THIS DOCUMENT HAS BEEN ELECTRONICALLY SIGNED BY CORA DALAL MD   EKG 12-lead, tracing only   Result Value Ref Range    Systolic Blood Pressure  mmHg    Diastolic Blood Pressure  mmHg    Ventricular Rate 82 BPM    Atrial Rate 82 BPM    GA Interval 148 ms    QRS Duration 80 ms     ms    QTc 427 ms    P Axis 18 degrees    R AXIS -27 degrees    T Axis 5 degrees    Interpretation ECG       Sinus rhythm  Normal ECG  No previous ECGs available  Confirmed by Alber Trimble ([id number]) on 1/16/2024 7:59:15 AM     CT Chest/Abdomen/Pelvis w Contrast    Narrative    PROCEDURE:  CT CHEST/ABDOMEN/PELVIS W CONTRAST.      HISTORY:  63 years Male R sided rib pain, R sided bruising. r/o rib  fracture, hematoma, ongoign bleeding, muscle tear. asthma, wheezing      TECHNIQUE:  Helical CT of the chest, abdomen and pelvis was performed  using non-ionic intravenous contrast. This CT exam was performed using  one or more the following dose reduction techniques: automated  exposure control, adjustment of the mA and/or kV according to patient  size, and/or iterative reconstruction technique.    COMPARISON:  1/16/2024.    MEDS/CONTRAST: 128 ml isovue 370    FINDINGS:  Respiratory motion limits assessment    The neck base is grossly symmetric. Cardiac size is normal. There is  no pericardial effusion. The thoracic aorta is dilated to 3.6 cm. No  abnormal thoracic adenopathy is identified.    There is a displaced lateral right 10th rib fracture. The central  airways are patent. There is a small right pleural effusion. There is  hazy opacity at the right lung base. No pneumothorax is seen.     The liver, spleen, pancreas and adrenal glands demonstrate no evidence  of solid organ injury. The gallbladder is unremarkable.  Symmetric  nephrograms are present without hydronephrosis. There is aneurysmal  dilatation of the abdominal aorta measuring up to 5.1 x 5.1 cm.    No abnormal bowel dilatation is present. .       Impression    IMPRESSION:      Displaced lateral right 10th rib fracture. Small right pleural  effusion. Asymmetric right base opacity may reflect atelectasis,  contusion or other acute pneumonitis.    Aneurysmal dilatation the abdominal aorta up to 5.1 cm    WILBERT KLEIN MD         SYSTEM ID:  [id number]       Medications   HYDROmorphone (DILAUDID) injection 1 mg (1 mg Intravenous $Given 1/16/24 0651)   sodium chloride 0.9 % infusion (has no administration in time range)   ondansetron (ZOFRAN) injection 4 mg (has no administration in time range)   oxyCODONE (ROXICODONE) tablet 5 mg (has no administration in time range)   HYDROmorphone (PF) (DILAUDID) injection 0.5 mg (0.5 mg Intravenous $Given 1/16/24 0912)   ipratropium - albuterol 0.5 mg/2.5 mg/3 mL (DUONEB) neb solution 3 mL (has no administration in time range)   methylPREDNISolone sodium succinate (solu-MEDROL) injection 62.5 mg (has no administration in time range)   cefTRIAXone (ROCEPHIN) 2 g vial to attach to  ml bag for ADULTS or NS 50 ml bag for PEDS (has no administration in time range)   azithromycin 500 mg (ZITHROMAX) in 0.9% NaCl 250 mL intermittent infusion 500 mg (has no administration in time range)   ketorolac (TORADOL) injection 15 mg (15 mg Intravenous $Given 1/16/24 0710)   LORazepam (ATIVAN) injection 1 mg (1 mg Intravenous $Given 1/16/24 0716)   iopamidol (ISOVUE-370) solution 128 mL (128 mLs Intravenous $Given 1/16/24 0816)       Assessments & Plan (with Medical Decision Making)     I have reviewed the nursing notes.    I have reviewed the findings, diagnosis, plan and need for follow up with the patient.           Medical Decision Making  The patient's presentation was of moderate complexity (a chronic illness mild to moderate  exacerbation, progression, or side effect of treatment).    The patient's evaluation involved:  review of 3+ test result(s) ordered prior to this encounter (see separate area of note for details)  ordering and/or review of 3+ test(s) in this encounter (see separate area of note for details)    The patient's management necessitated moderate risk (prescription drug management including medications given in the ED).        New Prescriptions    No medications on file       Final diagnoses:   Right-sided chest wall pain   Pneumonia of right lower lobe due to infectious organism   Closed fracture of one rib of right side, initial encounter   Acute respiratory failure with hypoxia (H)   Suspect early pneumonia from splinting from right-sided rib pain.  Pulmonary toilet.  Currently requiring oxygen up to 6 L which is new for him.  He has wheezing on exam which she states is chronic due to severe asthma.  I have called the hospitalist who has agreed to admit him to the general medical floor.  I have given a dose of Solu-Medrol 60 mg, of significance he was recently treated with prednisone.  Also gave him DuoNeb, Rocephin azithromycin and pain control.  Recommend ongoing pain control treatment antibiotics, pulmonary toilet PT OT prior to discharge home.  Patient and his wife agree with the plan as listed.    1/16/2024   [facility]       Demi Serrano DO  01/16/24 0941

## 2024-01-16 NOTE — PHARMACY-ADMISSION MEDICATION HISTORY
Pharmacist Admission Medication History    Admission medication history is complete. The information provided in this note is only as accurate as the sources available at the time of the update.    Information Source(s): Patient and CareEverywhere/SureScripts via in-person    Pertinent Information: Patient seems to have an ok understanding of his home medication; Patient was able to tell what each drug is used for and how to use it, but was not able to tell their names. Patient stated that he is using his ventolin HFA and Duoneb daily. Additionally, using Surescripts patient seems to be nonadherent, but states that he is consistent.     Changes made to PTA medication list:  Added: Benadryl, Tylenol, and Ibuprofen   Deleted:   prednisone 20mg   Finished course  Changed: None    Medication Affordability:  Not including over the counter (OTC) medications, was there a time in the past 3 months when you did not take your medications as prescribed because of cost?: No    Allergies reviewed with patient and updates made in EHR: yes    Medication History Completed By: Amrit Kothari [facility] 1/16/2024 11:00 AM    Prior to Admission medications    Medication Sig Last Dose Taking? Auth Provider Long Term End Date   acetaminophen (TYLENOL) 325 MG tablet Take 650 mg by mouth 2 times daily 1/15/2024 Yes Unknown, Entered By History     albuterol (PROAIR HFA/PROVENTIL HFA/VENTOLIN HFA) 108 (90 Base) MCG/ACT inhaler Inhale 2 puffs into the lungs every 6 hours as needed for shortness of breath, wheezing or cough 1/16/2024 at PM Yes Fernando Macedo MD Yes    Ascorbic Acid (VITAMIN C PO) Take 500 mg by mouth daily 1/15/2024 at AM Yes Reported, Patient     cholecalciferol (VITAMIN D3) 25 mcg (1000 units) capsule Take 1 capsule by mouth daily 1/15/2024 at AM Yes Reported, Patient     diphenhydrAMINE (BENADRYL) 50 MG capsule Take 50 mg by mouth every 6 hours as needed for itching or allergies Past Week Yes Unknown, Entered By  History     EPINEPHrine (ANY BX GENERIC EQUIV) 0.3 MG/0.3ML injection 2-pack INJECT 0.3 MLS (0.3MG) INTO THE MUSCLE AS NEEDED Unknown Yes Fernando Macedo MD     fluticasone-salmeterol (ADVAIR) 250-50 MCG/ACT inhaler Inhale 1 puff into the lungs every 12 hours 1/15/2024 at AM Yes Fernando Macedo MD Yes    ibuprofen (ADVIL/MOTRIN) 200 MG tablet Take 400 mg by mouth 2 times daily 1/15/2024 Yes Unknown, Entered By History     ipratropium - albuterol 0.5 mg/2.5 mg/3 mL (DUONEB) 0.5-2.5 (3) MG/3ML neb solution Take 1 vial (3 mLs) by nebulization every 6 hours as needed for shortness of breath or wheezing 1/16/2024 at AM Yes Fernando Macedo MD Yes    multiple vitamin  tablet TABS Take 1 tablet by mouth daily 1/15/2024 at AM Yes Unknown, Entered By History     predniSONE (DELTASONE) 10 MG tablet Take 10-20 mg by mouth as needed (Shortness of Breath) 1/16/2024 at 20mg AM Yes Unknown, Entered By History No    traMADol (ULTRAM) 50 MG tablet Take 1 tablet (50 mg) by mouth every 6 hours as needed for severe pain 1/16/2024 at AM Yes Christina Taveras PA-C     order for DME Equipment being ordered: Nebulizer   Shereen Chan APRN CNP

## 2024-01-16 NOTE — PROGRESS NOTES
RCAT completed and pt score scored  level 4.     RT recommends:  For lung volume recruitment, EZ-PAP QID and incentive spirometry.    For airway clearance, Flutter Valve QID and PRN.    RT will continue to follow and re-assess as needed.       Christian Harvey, RT

## 2024-01-17 VITALS
HEART RATE: 99 BPM | BODY MASS INDEX: 32.01 KG/M2 | OXYGEN SATURATION: 91 % | RESPIRATION RATE: 20 BRPM | DIASTOLIC BLOOD PRESSURE: 80 MMHG | TEMPERATURE: 98 F | SYSTOLIC BLOOD PRESSURE: 188 MMHG | WEIGHT: 223.1 LBS

## 2024-01-17 LAB
ANION GAP SERPL CALCULATED.3IONS-SCNC: 14 MMOL/L (ref 7–15)
BUN SERPL-MCNC: 22.6 MG/DL (ref 8–23)
CALCIUM SERPL-MCNC: 9 MG/DL (ref 8.8–10.2)
CHLORIDE SERPL-SCNC: 106 MMOL/L (ref 98–107)
CREAT SERPL-MCNC: 0.77 MG/DL (ref 0.67–1.17)
DEPRECATED HCO3 PLAS-SCNC: 20 MMOL/L (ref 22–29)
EGFRCR SERPLBLD CKD-EPI 2021: >90 ML/MIN/1.73M2
ERYTHROCYTE [DISTWIDTH] IN BLOOD BY AUTOMATED COUNT: 13.8 % (ref 10–15)
GLUCOSE SERPL-MCNC: 170 MG/DL (ref 70–99)
HCT VFR BLD AUTO: 43.3 % (ref 40–53)
HGB BLD-MCNC: 13.6 G/DL (ref 13.3–17.7)
HOLD SPECIMEN: NORMAL
HOLD SPECIMEN: NORMAL
MAGNESIUM SERPL-MCNC: 1.7 MG/DL (ref 1.7–2.3)
MCH RBC QN AUTO: 32.2 PG (ref 26.5–33)
MCHC RBC AUTO-ENTMCNC: 31.4 G/DL (ref 31.5–36.5)
MCV RBC AUTO: 102 FL (ref 78–100)
PLATELET # BLD AUTO: 226 10E3/UL (ref 150–450)
POTASSIUM SERPL-SCNC: 4.4 MMOL/L (ref 3.4–5.3)
RBC # BLD AUTO: 4.23 10E6/UL (ref 4.4–5.9)
SODIUM SERPL-SCNC: 140 MMOL/L (ref 135–145)
WBC # BLD AUTO: 15 10E3/UL (ref 4–11)

## 2024-01-17 PROCEDURE — 99239 HOSP IP/OBS DSCHRG MGMT >30: CPT | Performed by: INTERNAL MEDICINE

## 2024-01-17 PROCEDURE — 250N000009 HC RX 250: Performed by: INTERNAL MEDICINE

## 2024-01-17 PROCEDURE — 250N000011 HC RX IP 250 OP 636: Performed by: INTERNAL MEDICINE

## 2024-01-17 PROCEDURE — 258N000003 HC RX IP 258 OP 636: Performed by: FAMILY MEDICINE

## 2024-01-17 PROCEDURE — 36415 COLL VENOUS BLD VENIPUNCTURE: CPT | Performed by: INTERNAL MEDICINE

## 2024-01-17 PROCEDURE — 999N000157 HC STATISTIC RCP TIME EA 10 MIN

## 2024-01-17 PROCEDURE — 94640 AIRWAY INHALATION TREATMENT: CPT

## 2024-01-17 PROCEDURE — 250N000013 HC RX MED GY IP 250 OP 250 PS 637: Performed by: INTERNAL MEDICINE

## 2024-01-17 PROCEDURE — 85027 COMPLETE CBC AUTOMATED: CPT | Performed by: INTERNAL MEDICINE

## 2024-01-17 PROCEDURE — 83735 ASSAY OF MAGNESIUM: CPT | Performed by: INTERNAL MEDICINE

## 2024-01-17 PROCEDURE — 258N000003 HC RX IP 258 OP 636: Performed by: INTERNAL MEDICINE

## 2024-01-17 PROCEDURE — 80048 BASIC METABOLIC PNL TOTAL CA: CPT | Performed by: INTERNAL MEDICINE

## 2024-01-17 RX ORDER — PREDNISONE 20 MG/1
20 TABLET ORAL DAILY
Qty: 9 TABLET | Refills: 0 | Status: SHIPPED | OUTPATIENT
Start: 2024-01-17 | End: 2024-01-25

## 2024-01-17 RX ORDER — LEVOFLOXACIN 500 MG/1
500 TABLET, FILM COATED ORAL DAILY
Qty: 5 TABLET | Refills: 0 | Status: SHIPPED | OUTPATIENT
Start: 2024-01-17 | End: 2024-01-22

## 2024-01-17 RX ADMIN — OXYCODONE HYDROCHLORIDE AND ACETAMINOPHEN 500 MG: 500 TABLET ORAL at 09:26

## 2024-01-17 RX ADMIN — Medication 25 MCG: at 09:26

## 2024-01-17 RX ADMIN — IBUPROFEN 400 MG: 400 TABLET, FILM COATED ORAL at 09:25

## 2024-01-17 RX ADMIN — ALBUTEROL SULFATE 2.5 MG: 2.5 SOLUTION RESPIRATORY (INHALATION) at 06:11

## 2024-01-17 RX ADMIN — METHYLPREDNISOLONE SODIUM SUCCINATE 62.5 MG: 125 INJECTION, POWDER, FOR SOLUTION INTRAMUSCULAR; INTRAVENOUS at 09:21

## 2024-01-17 RX ADMIN — MULTIPLE VITAMINS W/ MINERALS TAB 1 TABLET: TAB at 09:25

## 2024-01-17 RX ADMIN — OXYCODONE HYDROCHLORIDE 5 MG: 5 TABLET ORAL at 08:09

## 2024-01-17 RX ADMIN — FLUTICASONE FUROATE AND VILANTEROL TRIFENATATE 1 PUFF: 100; 25 POWDER RESPIRATORY (INHALATION) at 06:16

## 2024-01-17 RX ADMIN — SODIUM CHLORIDE: 9 INJECTION, SOLUTION INTRAVENOUS at 04:59

## 2024-01-17 RX ADMIN — AZITHROMYCIN MONOHYDRATE 250 MG: 500 INJECTION, POWDER, LYOPHILIZED, FOR SOLUTION INTRAVENOUS at 10:07

## 2024-01-17 RX ADMIN — CEFTRIAXONE 2 G: 2 INJECTION, POWDER, FOR SOLUTION INTRAMUSCULAR; INTRAVENOUS at 09:23

## 2024-01-17 RX ADMIN — ACETAMINOPHEN 650 MG: 325 TABLET, FILM COATED ORAL at 09:26

## 2024-01-17 RX ADMIN — METHYLPREDNISOLONE SODIUM SUCCINATE 62.5 MG: 125 INJECTION, POWDER, FOR SOLUTION INTRAMUSCULAR; INTRAVENOUS at 01:16

## 2024-01-17 ASSESSMENT — ACTIVITIES OF DAILY LIVING (ADL)
ADLS_ACUITY_SCORE: 23

## 2024-01-17 NOTE — PLAN OF CARE
Goal Outcome Evaluation:      Plan of Care Reviewed With: patient, spouse    Overall Patient Progress: improving    Discharge Note      Data:  Uriel Monsalve discharged to home at 1330 via ambulation. Accompanied by staff.    Action:  Written discharge/follow-up instructions were provided to patient. Prescriptions sent to patients preferred pharmacy. All belongings sent with patient.    Response:  Leobardo verbalized understanding of discharge instructions, reason for discharge, and necessary follow-up appointments.

## 2024-01-17 NOTE — DISCHARGE SUMMARY
"[facility]  Hospitalist Discharge Summary      Date of Admission:  1/16/2024  Date of Discharge:  1/17/2024  Discharging Provider: Keaton Shah MD  Discharge Service: Hospitalist Service    Discharge Diagnoses   Pneumonia, Rib Fracture, Acute COPD exacerbation being discharged on oxygen    Clinically Significant Risk Factors     # Obesity: Estimated body mass index is 32.01 kg/m  as calculated from the following:    Height as of 1/15/24: 1.778 m (5' 10\").    Weight as of this encounter: 101.2 kg (223 lb 1.6 oz).       Follow-ups Needed After Discharge   Follow-up Appointments     Follow-up and recommended labs and tests       Follow up with primary care provider, Fernando Macedo, within 7 days to   evaluate medication change and to evaluate treatment change.  No follow up   labs or test are needed.        He should be referred to vascular surgery for his 5.1 cm AAA.  Consider a bone density scan due to his rib fracture with no \"trauma\" beyond coughing.    Unresulted Labs Ordered in the Past 30 Days of this Admission       No orders found for last 31 day(s).            Discharge Disposition   Discharged to home  Condition at discharge: Fair    Hospital Course      Uriel Monsalve is a 63 year old male admitted on 1/16/2024. He was admitted on 1/16 with pain control for a rib fracture and acute respiratory failure from community acquired pneumonia    Rib Fracture  - due to coughing - clearly he has bone demineralizaton from chronic steroid use  Plan:  Recommend bone density scan as an outpatient.      Community Acquired Pneumonia with Acute Respiratory Failure and COPD  - Infiltrate Right Base on CT  - Appears to have COPD based on prior PFTs  - He refused to stay in the hospital any longer.  He is stable on RA at discharge at rest, however requires 3L with activity.  - He is being dc'd on Levaquin to complete a 7 day total antibiotic course  - He is also being dc'd on a prednisone taper    AAA  - " 5.1 cm  - Referral to vascular surgery recommended as an outpatient      Consultations This Hospital Stay   RESPIRATORY CARE IP CONSULT    Code Status   Full Code    Time Spent on this Encounter   I, Keaton Shah MD, personally saw the patient today and spent greater than 30 minutes discharging this patient.       Keaton Shah MD  [facility]  [address]  [place] [address]  Phone: [phone]  Fax: [phone]  ______________________________________________________________________    Physical Exam   Vital Signs: Temp: 98  F (36.7  C) Temp src: Tympanic BP: (!) 188/80 Pulse: 99   Resp: 20 SpO2: 91 % O2 Device: None (Room air) Oxygen Delivery: 2 LPM  Weight: 223 lbs 1.6 oz  ----------------------------------------------------------------------------------------       Primary Care Physician   Fernando Macedo    Discharge Orders      Reason for your hospital stay    Pneumonia, COPD Exacerbation     Follow-up and recommended labs and tests     Follow up with primary care provider, Fernando Macedo, within 7 days to evaluate medication change and to evaluate treatment change.  No follow up labs or test are needed.     Activity    Your activity upon discharge: activity as tolerated.  You should wear your oxygen with any activity.     Oxygen Order    Oxygen Documentation  I certify that this patient, Uriel Monsalve has been under my care (or a nurse practitioner or physican's assistant working with me). This is the face-to-face encounter for oxygen medical necessity.      At the time of this encounter, I have reviewed the qualifying testing and have determined that supplemental oxygen is reasonable and necessary and is expected to improve the patient's condition in a home setting.       Patient has continued oxygen desaturation due to COPD J44.9.    If portability is ordered, is the patient mobile within the home? yes     Diet    Follow this diet upon discharge: Orders Placed This  Encounter      Combination Diet Regular Diet Adult       Significant Results and Procedures   Results for orders placed or performed during the hospital encounter of 01/16/24   XR Chest Port 1 View    Narrative    PROCEDURE INFORMATION:   Exam: XR Chest   Exam date and time: 1/16/2024 6:45 AM   Age: 63 years old   Clinical indication: Pain; Shortness of breath; Angina pectoris; Additional   info: SOB     TECHNIQUE:   Imaging protocol: Radiologic exam of the chest.   Views: 1 view.     COMPARISON:   CR XR CHEST PORT 1 VIEW 11/27/2022 9:24 AM     FINDINGS:   Lungs: Mid inspiratory effort with resultant low lung volumes. Minimal airspace   disease at the right lung base.   Pleural spaces: Unremarkable. No pleural effusion. No pneumothorax.   Heart/Mediastinum: Unremarkable. No cardiomegaly.   Vasculature: Tortuous thoracic aorta   Bones/joints: Unremarkable.       Impression    IMPRESSION:   1.   Minimal airspace disease at the right lung base.   2.   Followup radiographs recommended after appropriate therapy.     THIS DOCUMENT HAS BEEN ELECTRONICALLY SIGNED BY CORA DALAL MD   CT Chest/Abdomen/Pelvis w Contrast    Narrative    PROCEDURE:  CT CHEST/ABDOMEN/PELVIS W CONTRAST.      HISTORY:  63 years Male R sided rib pain, R sided bruising. r/o rib  fracture, hematoma, ongoign bleeding, muscle tear. asthma, wheezing      TECHNIQUE:  Helical CT of the chest, abdomen and pelvis was performed  using non-ionic intravenous contrast. This CT exam was performed using  one or more the following dose reduction techniques: automated  exposure control, adjustment of the mA and/or kV according to patient  size, and/or iterative reconstruction technique.    COMPARISON:  1/16/2024.    MEDS/CONTRAST: 128 ml isovue 370    FINDINGS:  Respiratory motion limits assessment    The neck base is grossly symmetric. Cardiac size is normal. There is  no pericardial effusion. The thoracic aorta is dilated to 3.6 cm. No  abnormal thoracic  adenopathy is identified.    There is a displaced lateral right 10th rib fracture. The central  airways are patent. There is a small right pleural effusion. There is  hazy opacity at the right lung base. No pneumothorax is seen.     The liver, spleen, pancreas and adrenal glands demonstrate no evidence  of solid organ injury. The gallbladder is unremarkable. Symmetric  nephrograms are present without hydronephrosis. There is aneurysmal  dilatation of the abdominal aorta measuring up to 5.1 x 5.1 cm.    No abnormal bowel dilatation is present. .       Impression    IMPRESSION:      Displaced lateral right 10th rib fracture. Small right pleural  effusion. Asymmetric right base opacity may reflect atelectasis,  contusion or other acute pneumonitis.    Aneurysmal dilatation the abdominal aorta up to 5.1 cm    WILBERT KLEIN MD         SYSTEM ID:  [id number]       Discharge Medications   Current Discharge Medication List        START taking these medications    Details   levofloxacin (LEVAQUIN) 500 MG tablet Take 1 tablet (500 mg) by mouth daily for 5 days  Qty: 5 tablet, Refills: 0    Associated Diagnoses: Acute respiratory failure with hypoxia (H)      !! predniSONE (DELTASONE) 20 MG tablet Take 1 tablet (20 mg) by mouth daily 40 mg daily for 3 days, then 20 mg daily for 3 days, then off  Qty: 9 tablet, Refills: 0    Associated Diagnoses: Chronic obstructive pulmonary disease with acute exacerbation (H)       !! - Potential duplicate medications found. Please discuss with provider.        CONTINUE these medications which have NOT CHANGED    Details   acetaminophen (TYLENOL) 325 MG tablet Take 650 mg by mouth 2 times daily      albuterol (PROAIR HFA/PROVENTIL HFA/VENTOLIN HFA) 108 (90 Base) MCG/ACT inhaler Inhale 2 puffs into the lungs every 6 hours as needed for shortness of breath, wheezing or cough  Qty: 18 g, Refills: 4    Comments: Pharmacy may dispense brand covered by insurance (Proair, or proventil or  ventolin or generic albuterol inhaler)  Associated Diagnoses: Reactive airway disease with acute exacerbation, unspecified asthma severity, unspecified whether persistent      Ascorbic Acid (VITAMIN C PO) Take 500 mg by mouth daily      cholecalciferol (VITAMIN D3) 25 mcg (1000 units) capsule Take 1 capsule by mouth daily      diphenhydrAMINE (BENADRYL) 50 MG capsule Take 50 mg by mouth every 6 hours as needed for itching or allergies      EPINEPHrine (ANY BX GENERIC EQUIV) 0.3 MG/0.3ML injection 2-pack INJECT 0.3 MLS (0.3MG) INTO THE MUSCLE AS NEEDED  Qty: 0.3 mL, Refills: 1    Associated Diagnoses: Mucopurulent chronic bronchitis (H)      fluticasone-salmeterol (ADVAIR) 250-50 MCG/ACT inhaler Inhale 1 puff into the lungs every 12 hours  Qty: 90 each, Refills: 4    Associated Diagnoses: COPD exacerbation (H)      ibuprofen (ADVIL/MOTRIN) 200 MG tablet Take 400 mg by mouth 2 times daily      ipratropium - albuterol 0.5 mg/2.5 mg/3 mL (DUONEB) 0.5-2.5 (3) MG/3ML neb solution Take 1 vial (3 mLs) by nebulization every 6 hours as needed for shortness of breath or wheezing  Qty: 90 mL, Refills: 4    Associated Diagnoses: Moderate persistent reactive airway disease with wheezing with acute exacerbation      multiple vitamin  tablet TABS Take 1 tablet by mouth daily      !! predniSONE (DELTASONE) 10 MG tablet Take 10-20 mg by mouth as needed (Shortness of Breath)      traMADol (ULTRAM) 50 MG tablet Take 1 tablet (50 mg) by mouth every 6 hours as needed for severe pain  Qty: 10 tablet, Refills: 0    Associated Diagnoses: Traumatic injury of rib      order for DME Equipment being ordered: Nebulizer  Qty: 1 Product, Refills: 1    Associated Diagnoses: COPD exacerbation (H)       !! - Potential duplicate medications found. Please discuss with provider.        Allergies   Allergies   Allergen Reactions    Seasonal

## 2024-01-17 NOTE — PROGRESS NOTES
Patient has been assessed for Home Oxygen needs. Oxygen readings:    *Pulse oximetry (SpO2) = 91% on room air at rest while awake.    *SpO2 improved to 93% on 3liters/minute at rest.    *SpO2 = 85% on room air during activity/with exercise.    *SpO2 improved to 91% on 3liters/minute during activity/with exercise.       Lisa Guerrero, RT

## 2024-01-17 NOTE — PLAN OF CARE
Patient A&Ox4. VSS, afebrile. 02 4LNC and satting in the low 90's after some slow deep breaths encouraged. LS remain diminished, expiratory wheezes heard in the bases. Up with SBA and steady, SOB and ZAMORA. Continent of B&B. Pain to ribs managed with scheduled tylenol and ibuprofen, denies pain elsewhere.     Goal Outcome Evaluation:      Plan of Care Reviewed With: patient    Overall Patient Progress: improving  Overall Patient Progress: improving    Outcome Evaluation: VSS, remains on 4LNC and sats in the mid 90's. Pain managed with scheduled tylenol and ibuprofen.

## 2024-01-17 NOTE — PHARMACY - DISCHARGE MEDICATION RECONCILIATION AND EDUCATION
Pharmacy:  Discharge Counseling and Medication Reconciliation    Uriel S Gricel  [address] [place] [address]  [phone] (home)   63 year old male  PCP: Fernando Macedo    Allergies: Seasonal    Discharge Counseling:    Pharmacist met with patient today to review the medication portion of the After Visit Summary (with an emphasis on NEW medications) and to address patient's questions/concerns.    Summary of Education: Counseled patient on new medication of Levofloxacin including indication, administration, and possible common side effects. Patient counseled to take medication with food to lessen stomach upset, also advised of potential diarrhea. Additionally advised patient to space out administration of this medication by at least 2 hours on either side of his multivitamin as well as antacids or dairy products so as not to interfere with absorption. Also counseled patient on increased risk of tendon rupture while on this medication- patient to avoid overly strenuous activity or heavy lifting if possible.    Also reviewed Prednisone taper with patient- he is to take 40 mg x3 days, then 20 mg x3 days, then resume how taking at home (patient reports only using as needed).    Materials Provided:  MedCounselor sheets printed from Clinical Pharmacology on: Levofloxacin    Discharge Medication Reconciliation:    It has been determined that the patient has an adequate supply of medications available or which can be obtained from the patient's preferred pharmacy, which he has confirmed as: [facility]- [place].    Thank you for the consult.    Reese Bowden [facility]........January 17, 2024 1:20 PM

## 2024-01-17 NOTE — PROGRESS NOTES
Interdisciplinary Discharge Planning Note    Anticipated Discharge Date: 1/17-1/18    Anticipated Discharge Location: Home     Clinical Needs Before Discharge:  stable oxygen requirement    Treatment Needs After Discharge:  home oxygen    Potential Barriers to Discharge: None Identified     GAUTAM Bales  1/17/2024,  12:00 PM

## 2024-01-17 NOTE — PROGRESS NOTES
Oxygen Documentation  I certify that this patient, Uriel Monsalve has been under my care (or a nurse practitioner or physican's assistant working with me). This is the face-to-face encounter for oxygen medical necessity.      At the time of this encounter, I have reviewed the qualifying testing and have determined that supplemental oxygen is reasonable and necessary and is expected to improve the patient's condition in a home setting.       Patient has continued oxygen desaturation due to COPD J44.9.

## 2024-01-17 NOTE — PROGRESS NOTES
:     Patient feels that he may need support services in the future. A care coordination referral has been placed to Annemarie Ahuja RN.     No further needs from  at this time.     GAUTAM Bales on 1/17/2024 at 1:56 PM

## 2024-01-17 NOTE — PROGRESS NOTES
Patient on a nasal cannula at 4 LPM, saturating in the low 90's.    IS, MDI, EZ-PAP and Flutter Valve done and tolerated well.    Christian Harvey, RT

## 2024-01-17 NOTE — PLAN OF CARE
Goal Outcome Evaluation:      Plan of Care Reviewed With: patient, spouse    Overall Patient Progress: improving

## 2024-01-17 NOTE — PROGRESS NOTES
Pt was 91% at rest while purse lip breathing. Pt was not wanting to go for a walk but writer explained what the process was. He walked out the door and back and dropped tp 85%. He required 3 L/m with exertion to get to 91%.   Pt did not care what home care company that was contacted.   Lisa Guerrero, RT

## 2024-01-18 ENCOUNTER — PATIENT OUTREACH (OUTPATIENT)
Dept: FAMILY MEDICINE | Facility: OTHER | Age: 64
End: 2024-01-18
Payer: COMMERCIAL

## 2024-01-25 ENCOUNTER — OFFICE VISIT (OUTPATIENT)
Dept: FAMILY MEDICINE | Facility: OTHER | Age: 64
End: 2024-01-25
Attending: FAMILY MEDICINE
Payer: COMMERCIAL

## 2024-01-25 VITALS
SYSTOLIC BLOOD PRESSURE: 130 MMHG | DIASTOLIC BLOOD PRESSURE: 88 MMHG | HEART RATE: 96 BPM | BODY MASS INDEX: 32.46 KG/M2 | WEIGHT: 226.2 LBS | RESPIRATION RATE: 22 BRPM | OXYGEN SATURATION: 94 % | TEMPERATURE: 98.2 F

## 2024-01-25 DIAGNOSIS — I71.40 ABDOMINAL AORTIC ANEURYSM (AAA) WITHOUT RUPTURE, UNSPECIFIED PART (H): Primary | ICD-10-CM

## 2024-01-25 DIAGNOSIS — R07.89 RIGHT-SIDED CHEST WALL PAIN: ICD-10-CM

## 2024-01-25 DIAGNOSIS — J18.9 PNEUMONIA OF RIGHT LOWER LOBE DUE TO INFECTIOUS ORGANISM: ICD-10-CM

## 2024-01-25 DIAGNOSIS — J45.901 REACTIVE AIRWAY DISEASE WITH ACUTE EXACERBATION, UNSPECIFIED ASTHMA SEVERITY, UNSPECIFIED WHETHER PERSISTENT: ICD-10-CM

## 2024-01-25 PROBLEM — J96.01 ACUTE RESPIRATORY FAILURE WITH HYPOXIA (H): Status: RESOLVED | Noted: 2024-01-16 | Resolved: 2024-01-25

## 2024-01-25 PROBLEM — J41.1 MUCOPURULENT CHRONIC BRONCHITIS (H): Status: RESOLVED | Noted: 2019-02-07 | Resolved: 2024-01-25

## 2024-01-25 PROCEDURE — 99495 TRANSJ CARE MGMT MOD F2F 14D: CPT | Performed by: FAMILY MEDICINE

## 2024-01-25 PROCEDURE — G0463 HOSPITAL OUTPT CLINIC VISIT: HCPCS

## 2024-01-25 RX ORDER — PREDNISONE 10 MG/1
10-20 TABLET ORAL PRN
Qty: 25 TABLET | Refills: 0 | Status: SHIPPED | OUTPATIENT
Start: 2024-01-25 | End: 2024-04-12

## 2024-01-25 ASSESSMENT — PAIN SCALES - GENERAL: PAINLEVEL: MODERATE PAIN (5)

## 2024-01-25 NOTE — PROGRESS NOTES
Assessment & Plan     Abdominal aortic aneurysm (AAA) without rupture, unspecified part (H24)  We will set up  - Vascular Surgery Referral; Future    Right-sided chest wall pain      Pneumonia of right lower lobe due to infectious organism      Reactive airway disease with acute exacerbation, unspecified asthma severity, unspecified whether persistent  Short course of prednisone.  - predniSONE (DELTASONE) 10 MG tablet; Take 1-2 tablets (10-20 mg) by mouth as needed (Shortness of Breath)          MED REC REQUIRED  Post Medication Reconciliation Status:  Discharge medications reconciled and changed, see notes/orders      No follow-ups on file.    Subjective   Leobardo is a 63 year old, presenting for the following health issues:  Hospital F/U    Patient arrives here for follow-up pneumonia.  He was recently hospitalized for pneumonia.  In his evaluation he did have a CT scan of his chest and abdomen.  Showing a 5.1 cm aortic aneurysm.  He reports that he is complete his antibiotics.  But continues to cough up.  Occasionally productive.  Patient does have a history of tobacco abuse.    History of Present Illness       Reason for visit:  Up date and refiles hos stay  Symptom onset:  1-2 weeks ago  Symptoms include:  Broke ribs nemonea    He eats 0-1 servings of fruits and vegetables daily.He consumes 3 sweetened beverage(s) daily.He exercises with enough effort to increase his heart rate 9 or less minutes per day.  He exercises with enough effort to increase his heart rate 4 days per week. He is missing 1 dose(s) of medications per week.           Hospital Follow-up Visit:    Hospital/Nursing Home/IP Rehab Facility: [facility]  Date of Admission: 01/16  Date of Discharge: 01/17  Reason(s) for Admission: pneumonia  rib fracture     Was your hospitalization related to COVID-19? No   Problems taking medications regularly:  None  Medication changes since discharge: None  Problems adhering to non-medication  therapy:  None    Summary of hospitalization:  [facility] discharge summary reviewed  Diagnostic Tests/Treatments reviewed.  Follow up needed: none  Other Healthcare Providers Involved in Patient s Care:         None  Update since discharge: improved.         Plan of care communicated with patient                       Objective    /88   Pulse 96   Temp 98.2  F (36.8  C)   Resp 22   Wt 102.6 kg (226 lb 3.2 oz)   SpO2 94%   BMI 32.46 kg/m    Body mass index is 32.46 kg/m .  Physical Exam  Constitutional:       Appearance: Normal appearance.   HENT:      Head: Normocephalic and atraumatic.   Cardiovascular:      Rate and Rhythm: Normal rate and regular rhythm.   Pulmonary:      Effort: Pulmonary effort is normal.      Breath sounds: Wheezing present.   Neurological:      General: No focal deficit present.      Mental Status: He is alert.        GENERAL: alert and no distress  NECK: no adenopathy, no asymmetry, masses, or scars  RESP: lungs clear to auscultation - no rales, rhonchi or wheezes  CV: regular rate and rhythm, normal S1 S2, no S3 or S4, no murmur, click or rub, no peripheral edema  MS: no gross musculoskeletal defects noted, no edema            Signed Electronically by: Fernando Macedo MD

## 2024-01-25 NOTE — NURSING NOTE
Patient here for hospital follow up after he fractured 2 ribs and had pneumonia. Medication Reconciliation: complete.    Reina Jordan LPN  1/25/2024 2:54 PM

## 2024-04-12 ENCOUNTER — NURSE TRIAGE (OUTPATIENT)
Dept: FAMILY MEDICINE | Facility: OTHER | Age: 64
End: 2024-04-12

## 2024-04-12 ENCOUNTER — OFFICE VISIT (OUTPATIENT)
Dept: FAMILY MEDICINE | Facility: OTHER | Age: 64
End: 2024-04-12
Attending: FAMILY MEDICINE
Payer: COMMERCIAL

## 2024-04-12 VITALS
WEIGHT: 230 LBS | TEMPERATURE: 97.8 F | HEART RATE: 92 BPM | DIASTOLIC BLOOD PRESSURE: 78 MMHG | RESPIRATION RATE: 20 BRPM | SYSTOLIC BLOOD PRESSURE: 126 MMHG | OXYGEN SATURATION: 93 % | BODY MASS INDEX: 33 KG/M2

## 2024-04-12 DIAGNOSIS — J45.901 REACTIVE AIRWAY DISEASE WITH ACUTE EXACERBATION, UNSPECIFIED ASTHMA SEVERITY, UNSPECIFIED WHETHER PERSISTENT: ICD-10-CM

## 2024-04-12 DIAGNOSIS — R06.00 DYSPNEA, UNSPECIFIED TYPE: ICD-10-CM

## 2024-04-12 DIAGNOSIS — M79.89 LEG SWELLING: Primary | ICD-10-CM

## 2024-04-12 LAB
ANION GAP SERPL CALCULATED.3IONS-SCNC: 10 MMOL/L (ref 7–15)
BUN SERPL-MCNC: 11.5 MG/DL (ref 8–23)
CALCIUM SERPL-MCNC: 8.9 MG/DL (ref 8.8–10.2)
CHLORIDE SERPL-SCNC: 105 MMOL/L (ref 98–107)
CREAT SERPL-MCNC: 0.83 MG/DL (ref 0.67–1.17)
DEPRECATED HCO3 PLAS-SCNC: 25 MMOL/L (ref 22–29)
EGFRCR SERPLBLD CKD-EPI 2021: >90 ML/MIN/1.73M2
ERYTHROCYTE [DISTWIDTH] IN BLOOD BY AUTOMATED COUNT: 13 % (ref 10–15)
GLUCOSE SERPL-MCNC: 150 MG/DL (ref 70–99)
HCT VFR BLD AUTO: 42.6 % (ref 40–53)
HGB BLD-MCNC: 13.9 G/DL (ref 13.3–17.7)
MCH RBC QN AUTO: 31.8 PG (ref 26.5–33)
MCHC RBC AUTO-ENTMCNC: 32.6 G/DL (ref 31.5–36.5)
MCV RBC AUTO: 98 FL (ref 78–100)
NT-PROBNP SERPL-MCNC: 73 PG/ML (ref 0–900)
PLATELET # BLD AUTO: 221 10E3/UL (ref 150–450)
POTASSIUM SERPL-SCNC: 4.3 MMOL/L (ref 3.4–5.3)
RBC # BLD AUTO: 4.37 10E6/UL (ref 4.4–5.9)
SODIUM SERPL-SCNC: 140 MMOL/L (ref 135–145)
WBC # BLD AUTO: 8.5 10E3/UL (ref 4–11)

## 2024-04-12 PROCEDURE — 82374 ASSAY BLOOD CARBON DIOXIDE: CPT | Mod: ZL | Performed by: FAMILY MEDICINE

## 2024-04-12 PROCEDURE — 99214 OFFICE O/P EST MOD 30 MIN: CPT | Performed by: FAMILY MEDICINE

## 2024-04-12 PROCEDURE — 36415 COLL VENOUS BLD VENIPUNCTURE: CPT | Mod: ZL | Performed by: FAMILY MEDICINE

## 2024-04-12 PROCEDURE — 85048 AUTOMATED LEUKOCYTE COUNT: CPT | Mod: ZL | Performed by: FAMILY MEDICINE

## 2024-04-12 PROCEDURE — G0463 HOSPITAL OUTPT CLINIC VISIT: HCPCS | Performed by: FAMILY MEDICINE

## 2024-04-12 PROCEDURE — 83880 ASSAY OF NATRIURETIC PEPTIDE: CPT | Mod: ZL | Performed by: FAMILY MEDICINE

## 2024-04-12 RX ORDER — PREDNISONE 10 MG/1
10-20 TABLET ORAL PRN
Qty: 30 TABLET | Refills: 0 | Status: SHIPPED | OUTPATIENT
Start: 2024-04-12 | End: 2024-05-06

## 2024-04-12 RX ORDER — FUROSEMIDE 20 MG
TABLET ORAL
Qty: 30 TABLET | Refills: 1 | Status: SHIPPED | OUTPATIENT
Start: 2024-04-12 | End: 2024-09-25

## 2024-04-12 ASSESSMENT — PAIN SCALES - GENERAL: PAINLEVEL: NO PAIN (0)

## 2024-04-12 NOTE — TELEPHONE ENCOUNTER
S-(situation): patient states both feet and ankles are swollen    B-(background): patient recently diagnosed with AAA, saw Evert Díaz on 2/27/24.  Patient states has he intermittent feeling of what he thought is acid reflux.    A-(assessment): patient has swelling in both feet up to ankles for the last 3 days, states seems to be getting worse.  Denies pitting edema, SOB,redness.  Patient states they feel stiff.  Patient is concerned due to his diagnosis of AAA.      R-(recommendations): informed to be seen today and informed patient will route message to provider for review and consideration and will call patient back with any other recommendations.    Eva Juan RN on 4/12/2024 at 8:49 AM            Reason for Disposition   MODERATE swelling of both ankles (e.g., swelling extends up to the knees) AND new-onset or worsening   Swelling of both ankles (i.e., pedal edema)    Additional Information   Negative: Sounds like a life-threatening emergency to the triager   Negative: Chest pain   Negative: Followed an insect bite and has localized swelling (e.g., small area of puffy or swollen skin)   Negative: Followed a knee injury   Negative: Ankle or foot injury   Negative: Pregnant with leg swelling or edema   Negative: Difficulty breathing at rest   Negative: Entire foot is cool or blue in comparison to other side   Negative: SEVERE swelling (e.g., swelling extends above knee, entire leg is swollen, weeping fluid)   Negative: Cast on leg or ankle and has increasing pain   Negative: Can't walk or can barely stand (new-onset)   Negative: Fever and red area (or area very tender to touch)   Negative: Patient sounds very sick or weak to the triager   Negative: Swelling of face, arm or hands  (Exception: Slight puffiness of fingers during hot weather.)   Negative: Pregnant 20 or more weeks and sudden weight gain (i.e., > 2 lbs, 1 kg in one week)   Negative: Thigh or calf pain and only 1 side and present > 1 hour    Negative: Thigh, calf, or ankle swelling in only one leg   Negative: Thigh, calf, or ankle swelling in both legs, but one side is definitely more swollen (Exception: Longstanding difference between legs.)    Protocols used: Leg Swelling and Edema-A-OH, Ankle Swelling-A-OH

## 2024-04-12 NOTE — PROGRESS NOTES
Assessment & Plan       ICD-10-CM    1. Leg swelling  M79.89 Brain Natriuretic Peptide (BNP)     Basic Metabolic Panel     CBC W PLT No Diff     furosemide (LASIX) 20 MG tablet     Echocardiogram Complete     Brain Natriuretic Peptide (BNP)     Basic Metabolic Panel     CBC W PLT No Diff      2. Reactive airway disease with acute exacerbation, unspecified asthma severity, unspecified whether persistent  J45.901 predniSONE (DELTASONE) 10 MG tablet      3. Dyspnea, unspecified type  R06.00 Brain Natriuretic Peptide (BNP)     Echocardiogram Complete     Brain Natriuretic Peptide (BNP)        Patient with new onset lower extremity swelling.  Recommend echocardiogram for further evaluation.  Discussed use of compression stockings and Lasix.    Patient has a history of moderate COPD.  But seems to be using prednisone pretty frequently.  He is requesting a refill today.  This is provided.  But we did discuss that improved management of his COPD would likely reduce his need to use prednisone.  There also may be other etiologies for his breathing complaints that are all related to COPD.    It does not sound like he is consistently using Advair.  He may benefit from Spiriva.  But he has a follow-up with his regular provider next month and this can be addressed at this time.    Patient Instructions   Would recommend an echocardiogram. This has been ordered.     Wear knee high compression stockings daily.     Start lasix 40mg daily 3 days, and then decrease to 20mg and okay to use as needed.     I did refill prednisone, but I think if you are needing it once a month than it would be worth meeting with pulmonology to see if we can better manage your COPD. Talk with Dr. Macedo about this next month when you see him.     In the meantime, please wait until we have labs back today before taking prednisone. If your BNP is elevated this would indicate that your breathing issues are likely related to fluid build up and would recommend  waiting to see how the lasix works.       No follow-ups on file.    Subjective   Leobardo is a 63 year old, presenting for the following health issues:  Foot Swelling (Foot Swelling )        4/12/2024    11:10 AM   Additional Questions   Roomed by kelsie davenport LPN     History of Present Illness       Reason for visit:  Swell in feet  Symptom onset:  1-2 weeks ago  Symptom intensity:  Moderate  Symptom progression:  Worsening  Had these symptoms before:  No    He eats 0-1 servings of fruits and vegetables daily.He consumes 3 sweetened beverage(s) daily.He exercises with enough effort to increase his heart rate 20 to 29 minutes per day.  He exercises with enough effort to increase his heart rate 7 days per week. He is missing 1 dose(s) of medications per week.     AAA 5 cm, followed by vascular surgery. Recent diagnosis, and recent initial consult.     This was diagnosed in January when he had imaging for fractured ribs.     LE swelling started about 1 week ago.     Hx of asthma. Hx of allergies. Patient not sure if he has COPD.     Quit smoking a few years ago. Has declined lung cancer screening, but now on Medicare so may reconsider. Did have a CT in January due to rib fracture.     Last PFT 2023 with moderate COPD    Takes prednisone as needed for breathing issues. Had 1 pill left and took it this morning.     Took a 3 day burst a few weeks ago. Does this at least once a month. Does this if he feels like inhalers/nebs aren't working. States he has been advised he can take prednisone daily.     Advairr prescribed but doesn't take regularly as he thinks he is having some of the side effects listed and not sure that it is helping.         Objective    /78 (BP Location: Right arm, Patient Position: Sitting, Cuff Size: Adult Large)   Pulse 92   Temp 97.8  F (36.6  C) (Tympanic)   Resp 20   Wt 104.3 kg (230 lb)   SpO2 93%   BMI 33.00 kg/m    Body mass index is 33 kg/m .  Physical Exam  Constitutional:        Appearance: He is well-developed.   Eyes:      Conjunctiva/sclera: Conjunctivae normal.   Neck:      Thyroid: No thyromegaly.   Cardiovascular:      Rate and Rhythm: Normal rate and regular rhythm.      Heart sounds: Normal heart sounds. No murmur heard.  Pulmonary:      Effort: Pulmonary effort is normal. No respiratory distress.      Breath sounds: Normal breath sounds.   Abdominal:      Palpations: Abdomen is soft.   Musculoskeletal:      Comments: Bilateral 1-2+ pitting edema to the mid calves.  Signs of vascular insufficiency.   Lymphadenopathy:      Cervical: No cervical adenopathy.   Skin:     Findings: No rash.   Neurological:      Mental Status: He is alert and oriented to person, place, and time.        Results for orders placed or performed in visit on 04/12/24   Brain Natriuretic Peptide (BNP)     Status: Normal   Result Value Ref Range    N Terminal Pro BNP Outpatient 73 0 - 900 pg/mL   Basic Metabolic Panel     Status: Abnormal   Result Value Ref Range    Sodium 140 135 - 145 mmol/L    Potassium 4.3 3.4 - 5.3 mmol/L    Chloride 105 98 - 107 mmol/L    Carbon Dioxide (CO2) 25 22 - 29 mmol/L    Anion Gap 10 7 - 15 mmol/L    Urea Nitrogen 11.5 8.0 - 23.0 mg/dL    Creatinine 0.83 0.67 - 1.17 mg/dL    GFR Estimate >90 >60 mL/min/1.73m2    Calcium 8.9 8.8 - 10.2 mg/dL    Glucose 150 (H) 70 - 99 mg/dL   CBC W PLT No Diff     Status: Abnormal   Result Value Ref Range    WBC Count 8.5 4.0 - 11.0 10e3/uL    RBC Count 4.37 (L) 4.40 - 5.90 10e6/uL    Hemoglobin 13.9 13.3 - 17.7 g/dL    Hematocrit 42.6 40.0 - 53.0 %    MCV 98 78 - 100 fL    MCH 31.8 26.5 - 33.0 pg    MCHC 32.6 31.5 - 36.5 g/dL    RDW 13.0 10.0 - 15.0 %    Platelet Count 221 150 - 450 10e3/uL             Signed Electronically by: Lenora Bass MD

## 2024-04-12 NOTE — PATIENT INSTRUCTIONS
Would recommend an echocardiogram. This has been ordered.     Wear knee high compression stockings daily.     Start lasix 40mg daily 3 days, and then decrease to 20mg and okay to use as needed.     I did refill prednisone, but I think if you are needing it once a month than it would be worth meeting with pulmonology to see if we can better manage your COPD. Talk with Dr. Macedo about this next month when you see him.     In the meantime, please wait until we have labs back today before taking prednisone. If your BNP is elevated this would indicate that your breathing issues are likely related to fluid build up and would recommend waiting to see how the lasix works.

## 2024-04-12 NOTE — NURSING NOTE
Chief Complaint   Patient presents with    Foot Swelling     Foot Swelling        Medication Reconciliation: complete    Elba Gonzalez LPN........................4/12/2024  11:15 AM

## 2024-04-12 NOTE — LETTER
April 12, 2024      Leobardo Monsalve  [address] [place] [address]        Dear ,    We are writing to inform you of your test results.    Your test results fall within the expected range(s) or remain unchanged from previous results.  Please continue with current treatment plan.    Resulted Orders   Brain Natriuretic Peptide (BNP)   Result Value Ref Range    N Terminal Pro BNP Outpatient 73 0 - 900 pg/mL      Comment:      Reference range shown and results flagged as abnormal are for the outpatient, non acute settings. Establishing a baseline value for each individual patient is useful for follow-up.    Suggested inpatient cut points for confirming diagnosis of CHF in an acute setting are:  >450 pg/mL (age 18 to less than 50)  >900 pg/mL (age 50 to less than 75)  >1800 pg/mL (75 yrs and older)    An inpatient or emergency department NT-proPBNP <300 pg/mL effectively rules out acute CHF, with 99% negative predictive value.       Basic Metabolic Panel   Result Value Ref Range    Sodium 140 135 - 145 mmol/L      Comment:      Reference intervals for this test were updated on 09/26/2023 to more accurately reflect our healthy population. There may be differences in the flagging of prior results with similar values performed with this method. Interpretation of those prior results can be made in the context of the updated reference intervals.     Potassium 4.3 3.4 - 5.3 mmol/L    Chloride 105 98 - 107 mmol/L    Carbon Dioxide (CO2) 25 22 - 29 mmol/L    Anion Gap 10 7 - 15 mmol/L    Urea Nitrogen 11.5 8.0 - 23.0 mg/dL    Creatinine 0.83 0.67 - 1.17 mg/dL    GFR Estimate >90 >60 mL/min/1.73m2    Calcium 8.9 8.8 - 10.2 mg/dL    Glucose 150 (H) 70 - 99 mg/dL   CBC W PLT No Diff   Result Value Ref Range    WBC Count 8.5 4.0 - 11.0 10e3/uL    RBC Count 4.37 (L) 4.40 - 5.90 10e6/uL    Hemoglobin 13.9 13.3 - 17.7 g/dL    Hematocrit 42.6 40.0 - 53.0 %    MCV 98 78 - 100 fL    MCH 31.8 26.5 - 33.0 pg    MCHC  32.6 31.5 - 36.5 g/dL    RDW 13.0 10.0 - 15.0 %    Platelet Count 221 150 - 450 10e3/uL       If you have any questions or concerns, please call the clinic at the number listed above.       Sincerely,      Lenora Bass MD

## 2024-04-24 ENCOUNTER — HOSPITAL ENCOUNTER (OUTPATIENT)
Dept: CARDIOLOGY | Facility: OTHER | Age: 64
Discharge: HOME | End: 2024-04-24
Attending: FAMILY MEDICINE | Admitting: FAMILY MEDICINE
Payer: MEDICARE

## 2024-04-24 LAB — LVEF ECHO: NORMAL

## 2024-04-24 PROCEDURE — 93306 TTE W/DOPPLER COMPLETE: CPT | Mod: 26 | Performed by: INTERNAL MEDICINE

## 2024-04-24 PROCEDURE — C8929 TTE W OR WO FOL WCON,DOPPLER: HCPCS

## 2024-04-24 PROCEDURE — 255N000002 HC RX 255 OP 636: Performed by: FAMILY MEDICINE

## 2024-04-24 RX ADMIN — PERFLUTREN 3 ML: 6.52 INJECTION, SUSPENSION INTRAVENOUS at 14:56

## 2024-05-04 DIAGNOSIS — J45.901 REACTIVE AIRWAY DISEASE WITH ACUTE EXACERBATION, UNSPECIFIED ASTHMA SEVERITY, UNSPECIFIED WHETHER PERSISTENT: ICD-10-CM

## 2024-05-06 ENCOUNTER — OFFICE VISIT (OUTPATIENT)
Dept: FAMILY MEDICINE | Facility: OTHER | Age: 64
End: 2024-05-06
Attending: FAMILY MEDICINE
Payer: COMMERCIAL

## 2024-05-06 VITALS
RESPIRATION RATE: 20 BRPM | TEMPERATURE: 98.2 F | WEIGHT: 225.8 LBS | OXYGEN SATURATION: 94 % | DIASTOLIC BLOOD PRESSURE: 78 MMHG | HEART RATE: 88 BPM | BODY MASS INDEX: 32.4 KG/M2 | SYSTOLIC BLOOD PRESSURE: 120 MMHG

## 2024-05-06 DIAGNOSIS — J45.901 REACTIVE AIRWAY DISEASE WITH ACUTE EXACERBATION, UNSPECIFIED ASTHMA SEVERITY, UNSPECIFIED WHETHER PERSISTENT: ICD-10-CM

## 2024-05-06 DIAGNOSIS — J41.1 MUCOPURULENT CHRONIC BRONCHITIS (H): ICD-10-CM

## 2024-05-06 DIAGNOSIS — M79.89 LEG SWELLING: ICD-10-CM

## 2024-05-06 DIAGNOSIS — J44.1 COPD EXACERBATION (H): ICD-10-CM

## 2024-05-06 DIAGNOSIS — S29.9XXA TRAUMATIC INJURY OF RIB: ICD-10-CM

## 2024-05-06 DIAGNOSIS — J45.41 MODERATE PERSISTENT REACTIVE AIRWAY DISEASE WITH WHEEZING WITH ACUTE EXACERBATION: ICD-10-CM

## 2024-05-06 PROCEDURE — G0463 HOSPITAL OUTPT CLINIC VISIT: HCPCS

## 2024-05-06 PROCEDURE — 99214 OFFICE O/P EST MOD 30 MIN: CPT | Performed by: FAMILY MEDICINE

## 2024-05-06 RX ORDER — IPRATROPIUM BROMIDE AND ALBUTEROL SULFATE 2.5; .5 MG/3ML; MG/3ML
1 SOLUTION RESPIRATORY (INHALATION) EVERY 6 HOURS PRN
Qty: 90 ML | Refills: 4 | Status: SHIPPED | OUTPATIENT
Start: 2024-05-06

## 2024-05-06 RX ORDER — FUROSEMIDE 20 MG
TABLET ORAL
Qty: 30 TABLET | Refills: 1 | Status: CANCELLED | OUTPATIENT
Start: 2024-05-06

## 2024-05-06 RX ORDER — FLUTICASONE PROPIONATE AND SALMETEROL 250; 50 UG/1; UG/1
1 POWDER RESPIRATORY (INHALATION) EVERY 12 HOURS
Qty: 90 EACH | Refills: 4 | Status: SHIPPED | OUTPATIENT
Start: 2024-05-06

## 2024-05-06 RX ORDER — TRAMADOL HYDROCHLORIDE 50 MG/1
50 TABLET ORAL EVERY 6 HOURS PRN
Qty: 10 TABLET | Refills: 0 | Status: CANCELLED | OUTPATIENT
Start: 2024-05-06

## 2024-05-06 RX ORDER — EPINEPHRINE 0.3 MG/.3ML
INJECTION SUBCUTANEOUS
Qty: 0.3 ML | Refills: 1 | Status: SHIPPED | OUTPATIENT
Start: 2024-05-06

## 2024-05-06 RX ORDER — PREDNISONE 10 MG/1
10-20 TABLET ORAL PRN
Qty: 30 TABLET | Refills: 2 | Status: SHIPPED | OUTPATIENT
Start: 2024-05-06 | End: 2024-05-07

## 2024-05-06 RX ORDER — ALBUTEROL SULFATE 90 UG/1
2 AEROSOL, METERED RESPIRATORY (INHALATION) EVERY 6 HOURS PRN
Qty: 18 G | Refills: 4 | Status: SHIPPED | OUTPATIENT
Start: 2024-05-06

## 2024-05-06 ASSESSMENT — PAIN SCALES - GENERAL: PAINLEVEL: NO PAIN (0)

## 2024-05-06 NOTE — NURSING NOTE
Patient here for medication review and refills, he continues to have edema in the feet and ankles. Medication Reconciliation: complete.    Reina Jordan LPN  5/6/2024 2:01 PM

## 2024-05-07 DIAGNOSIS — J45.901 REACTIVE AIRWAY DISEASE WITH ACUTE EXACERBATION, UNSPECIFIED ASTHMA SEVERITY, UNSPECIFIED WHETHER PERSISTENT: ICD-10-CM

## 2024-05-07 NOTE — TELEPHONE ENCOUNTER
Pharmacy sent note asking for clarification of directions. Says take 1-2 by mouth as needed. Please clarify frequency. Smita Ramirez LPN ....................5/7/2024  3:17 PM

## 2024-05-07 NOTE — PROGRESS NOTES
SUBJECTIVE:   Uriel Monsalve is a 63 year old male who presents to clinic today for the following health issues:  Follow-up leg swelling echocardiogram  Patient arrives here for follow-up leg swelling echocardiogram.  He did have an echocardiogram showing ejection fraction 50 to 55%.  BNP was normal.  I did discuss the echo with the patient.  He also  request medications.  Prednisone which she takes periodically for COPD.  As needed.    History of Present Illness     Asthma:  He presents for follow up of asthma.  He has some cough, some wheezing, and some shortness of breath.  He is using a relief medication 2-3 times per day. He typically misses taking his controller medication 1 time(s) per week. Patient is aware of the following triggers: same as previous visit. The patient has not had a visit to the Emergency Room, Urgent Care or Hospital due to asthma since the last clinic visit.     Vascular Disease:  He presents for follow up of vascular disease.     He never takes nitroglycerin. He is not taking daily aspirin.    He eats 0-1 servings of fruits and vegetables daily.He consumes 2 sweetened beverage(s) daily.He exercises with enough effort to increase his heart rate 20 to 29 minutes per day.  He exercises with enough effort to increase his heart rate 7 days per week. He is missing 2 dose(s) of medications per week.  He is not taking prescribed medications regularly due to remembering to take.        Patient Active Problem List    Diagnosis Date Noted    Right-sided chest wall pain 01/16/2024     Priority: Medium    Closed fracture of one rib of right side, initial encounter 01/16/2024     Priority: Medium    Pneumonia of right lower lobe due to infectious organism 01/16/2024     Priority: Medium    Abdominal aortic aneurysm (AAA) without rupture, unspecified part (H24) 01/16/2024     Priority: Medium     5.1cm      FH: colon cancer 09/13/2021     Priority: Medium    Reactive airway disease with acute  exacerbation, unspecified asthma severity, unspecified whether persistent 07/30/2019     Priority: Medium    History of tobacco use 07/30/2019     Priority: Medium    Psychosexual dysfunction with inhibited sexual excitement 02/01/2018     Priority: Medium    Diverticulosis of sigmoid colon 01/25/2016     Priority: Medium    H/O adenomatous polyp of colon 01/25/2016     Priority: Medium    Hypercholesterolemia 01/12/2016     Priority: Medium    Hives 01/11/2016     Priority: Medium     No past medical history on file.   Past Surgical History:   Procedure Laterality Date    COLONOSCOPY  01/25/2016    Serrated adenoma  ,F/U 2021    COLONOSCOPY N/A 09/13/2021    F/U 2026 FH colon cancer. Hyperplastic and Sigmoid diverticulosis.    ENDOSCOPIC SINUS SURGERY      No Comments Provided    EXTRACTION(S) DENTAL      No Comments Provided       Review of Systems     OBJECTIVE:     /78   Pulse 88   Temp 98.2  F (36.8  C)   Resp 20   Wt 102.4 kg (225 lb 12.8 oz)   SpO2 94%   BMI 32.40 kg/m    Body mass index is 32.4 kg/m .  Physical Exam  Constitutional:       Appearance: Normal appearance.   Cardiovascular:      Rate and Rhythm: Normal rate and regular rhythm.   Pulmonary:      Effort: Pulmonary effort is normal.      Breath sounds: Normal breath sounds.   Abdominal:      General: Abdomen is flat.   Musculoskeletal:         General: No swelling.   Neurological:      Mental Status: He is alert.   Psychiatric:         Mood and Affect: Mood normal.         Diagnostic Test Results:  none     ASSESSMENT/PLAN:         (J45.901) Reactive airway disease with acute exacerbation, unspecified asthma severity, unspecified whether persistent  Comment: Refill  Plan: albuterol (PROAIR HFA/PROVENTIL HFA/VENTOLIN         HFA) 108 (90 Base) MCG/ACT inhaler, predniSONE         (DELTASONE) 10 MG tablet            (J41.1) Mucopurulent chronic bronchitis (H)  Comment:   Plan: EPINEPHrine (ANY BX GENERIC EQUIV) 0.3 MG/0.3ML         injection 2-pack            (J44.1) COPD exacerbation (H)  Comment: Refill history of  Plan: fluticasone-salmeterol (ADVAIR) 250-50 MCG/ACT         inhaler            (J45.41) Moderate persistent reactive airway disease with wheezing with acute exacerbation  Comment:   Plan: ipratropium - albuterol 0.5 mg/2.5 mg/3 mL         (DUONEB) 0.5-2.5 (3) MG/3ML neb solution            (S29.9XXA) Traumatic injury of rib- posterior right lower  Comment:   Plan:     (M79.89) Leg swelling  Comment:   Plan: Doubt this is CHF and more likely dependent edema.  We discussed keeping legs elevated.  Watching his salt.  The longitudinal plan of care for the diagnosis(es)/condition(s) as documented were addressed during this visit. Due to the added complexity in care, I will continue to support Leobardo in the subsequent management and with ongoing continuity of care.        Fernando Macedo MD  [facility]

## 2024-05-09 RX ORDER — PREDNISONE 10 MG/1
10-20 TABLET ORAL PRN
Qty: 30 TABLET | Refills: 2 | Status: SHIPPED | OUTPATIENT
Start: 2024-05-09

## 2024-05-09 RX ORDER — ALBUTEROL SULFATE 90 UG/1
AEROSOL, METERED RESPIRATORY (INHALATION)
Qty: 9 G | Refills: 0 | OUTPATIENT
Start: 2024-05-09

## 2024-05-09 NOTE — TELEPHONE ENCOUNTER
[id number] sent Rx request for the following:      Requested Prescriptions   Pending Prescriptions Disp Refills    albuterol (PROAIR HFA/PROVENTIL HFA/VENTOLIN HFA) 108 (90 Base) MCG/ACT inhaler [Pharmacy Med Name: Albuterol Sulfate  (90 Base) MCG/ACT Inhalation Aerosol Solution] 9 g 0     Sig: INHALE 2 PUFFS BY MOUTH EVERY 6 HOURS AS NEEDED FOR SHORTNESS OF BREATH ,  WHEEZING,  OR  COUGH     Approved 5/6/24. Pharmacy notified. Geri Nixon RN .............. 5/9/2024  10:36 AM

## 2024-09-25 ENCOUNTER — OFFICE VISIT (OUTPATIENT)
Dept: FAMILY MEDICINE | Facility: OTHER | Age: 64
End: 2024-09-25
Attending: FAMILY MEDICINE
Payer: MEDICARE

## 2024-09-25 VITALS
HEART RATE: 84 BPM | BODY MASS INDEX: 29.06 KG/M2 | SYSTOLIC BLOOD PRESSURE: 121 MMHG | WEIGHT: 207.6 LBS | RESPIRATION RATE: 16 BRPM | TEMPERATURE: 97.9 F | OXYGEN SATURATION: 93 % | HEIGHT: 71 IN | DIASTOLIC BLOOD PRESSURE: 76 MMHG

## 2024-09-25 DIAGNOSIS — Z01.818 PREOP GENERAL PHYSICAL EXAM: Primary | ICD-10-CM

## 2024-09-25 DIAGNOSIS — I71.40 ABDOMINAL AORTIC ANEURYSM (AAA) WITHOUT RUPTURE, UNSPECIFIED PART (H): ICD-10-CM

## 2024-09-25 DIAGNOSIS — J45.901 REACTIVE AIRWAY DISEASE WITH ACUTE EXACERBATION, UNSPECIFIED ASTHMA SEVERITY, UNSPECIFIED WHETHER PERSISTENT: ICD-10-CM

## 2024-09-25 LAB
ANION GAP SERPL CALCULATED.3IONS-SCNC: 9 MMOL/L (ref 7–15)
BUN SERPL-MCNC: 11.9 MG/DL (ref 8–23)
CALCIUM SERPL-MCNC: 9.2 MG/DL (ref 8.8–10.4)
CHLORIDE SERPL-SCNC: 105 MMOL/L (ref 98–107)
CREAT SERPL-MCNC: 0.86 MG/DL (ref 0.67–1.17)
EGFRCR SERPLBLD CKD-EPI 2021: >90 ML/MIN/1.73M2
GLUCOSE SERPL-MCNC: 124 MG/DL (ref 70–99)
HCO3 SERPL-SCNC: 28 MMOL/L (ref 22–29)
POTASSIUM SERPL-SCNC: 4.6 MMOL/L (ref 3.4–5.3)
SODIUM SERPL-SCNC: 142 MMOL/L (ref 135–145)

## 2024-09-25 PROCEDURE — 82374 ASSAY BLOOD CARBON DIOXIDE: CPT | Mod: ZL | Performed by: FAMILY MEDICINE

## 2024-09-25 PROCEDURE — 36415 COLL VENOUS BLD VENIPUNCTURE: CPT | Mod: ZL | Performed by: FAMILY MEDICINE

## 2024-09-25 PROCEDURE — 82565 ASSAY OF CREATININE: CPT | Mod: ZL | Performed by: FAMILY MEDICINE

## 2024-09-25 PROCEDURE — G0463 HOSPITAL OUTPT CLINIC VISIT: HCPCS

## 2024-09-25 ASSESSMENT — PAIN SCALES - GENERAL: PAINLEVEL: NO PAIN (0)

## 2024-09-25 NOTE — NURSING NOTE
"Chief Complaint   Patient presents with    Pre-Op Exam       Initial /76 (BP Location: Right arm, Patient Position: Sitting, Cuff Size: Adult Regular)   Pulse 84   Temp 97.9  F (36.6  C) (Tympanic)   Resp 16   Ht 1.797 m (5' 10.75\")   Wt 94.2 kg (207 lb 9.6 oz)   SpO2 93%   BMI 29.16 kg/m   Estimated body mass index is 29.16 kg/m  as calculated from the following:    Height as of this encounter: 1.797 m (5' 10.75\").    Weight as of this encounter: 94.2 kg (207 lb 9.6 oz).  Medication Review: complete    The next two questions are to help us understand your food security.  If you are feeling you need any assistance in this area, we have resources available to support you today.          1/11/2024   SDOH- Food Insecurity   Within the past 12 months, did you worry that your food would run out before you got money to buy more? N   Within the past 12 months, did the food you bought just not last and you didn t have money to get more? N            Health Care Directive:  Patient does not have a Health Care Directive or Living Will: Discussed advance care planning with patient; however, patient declined at this time.    Hayley Guadalupe      "

## 2024-09-25 NOTE — PATIENT INSTRUCTIONS

## 2024-09-25 NOTE — PROGRESS NOTES
Preoperative Evaluation  [facility]  [address]  [place] [address]  Phone: [phone]  Fax: [phone]  Primary Provider: Fernando Macedo MD  Pre-op Performing Provider: Eliezer Hernandez MD  Sep 25, 2024             9/25/2024   Surgical Information   What procedure is being done? Right cataract - 9/30/24, Left cataract 10/16/24   Facility or Hospital where procedure/surgery will be performed: [facility]   Who is doing the procedure / surgery? Dr. Mejias   Date of surgery / procedure: 9/30/24   Time of surgery / procedure: TBD   Where do you plan to recover after surgery? at home with family        Fax number for surgical facility: [phone]    Assessment & Plan     The proposed surgical procedure is considered LOW risk.    (Z01.818) Preop general physical exam  (primary encounter diagnosis)  Comment: Patient is easily able to tolerate greater than 4 METS of activity.  He is in his usual state of health with no acute illness.  This is a low risk surgery.  At this time there is no need for further risk stratification    Plan: Basic Metabolic Panel    (J45.901) Reactive airway disease with acute exacerbation, unspecified asthma severity, unspecified whether persistent  Comment: Respiratory status is stable.  Patient rarely has obstructive lung symptoms.  He may continue on his current medications as prescribed throughout surgery    (I71.40) Abdominal aortic aneurysm (AAA) without rupture, unspecified part (H24)  Comment: Patient had evaluation of the AAA yesterday and is 5.2 cm increased from 5.0 cm.  He continues to follow with vascular surgery.  Again this is a very low risk surgery and patient may proceed.               - No identified additional risk factors other than previously addressed    Preoperative Medication Instructions  Antiplatelet or Anticoagulation Medication Instructions   - Patient is on no antiplatelet or anticoagulation  medications.    Additional Medication Instructions   - Diuretics: DO NOT TAKE on the day of surgery.   - ibuprofen (Advil, Motrin): DO NOT TAKE 1 day before surgery.    - LABA, inhaled corticosteroid, long-acting anticholinergics: Continue without modification.    Recommendation  Approval given to proceed with proposed procedure, without further diagnostic evaluation.    Jean Paul Booth is a 63 year old, presenting for the following:  Pre-Op Exam          9/25/2024    10:35 AM   Additional Questions   Roomed by Hayley LEIVA   Accompanied by self     HPI related to upcoming procedure: Patient comes in for perioperative evaluation prior to cataract surgery.  Patient reports that he feels well and has no concerns.  Patient has a history of reactive airway disease that is triggered by cold and occasionally fumes or smells.  He has used albuterol when he has had an exacerbation and reports that it always works.  He has not had any recent issues with his breathing.  He is also not had any fevers, chills, cough, abdominal pain, urinary or bowel concerns or changes.    Patient reports she is very active.  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.  He is able to walk briskly at least 1/4 mile and does not experience any chest pain, shortness of breath, numbness or weakness of the arm or jaw or any exertional indigestion.    He had a colonoscopy about 5 years ago and did not have any issues with anesthesia.  He has never had any issues with anesthesia or bleeding.    Patient is full code.        9/25/2024   Pre-Op Questionnaire   Have you ever had a heart attack or stroke? No   Have you ever had surgery on your heart or blood vessels, such as a stent placement, a coronary artery bypass, or surgery on an artery in your head, neck, heart, or legs? No   Do you have chest pain with activity? No   Do you have a history of heart failure? No   Do you currently have a cold, bronchitis or symptoms of other infection? No   Do you  have a cough, shortness of breath, or wheezing? No   Do you or anyone in your family have previous history of blood clots? No   Do you or does anyone in your family have a serious bleeding problem such as prolonged bleeding following surgeries or cuts? No   Have you ever had problems with anemia or been told to take iron pills? No   Have you had any abnormal blood loss such as black, tarry or bloody stools? No   Have you ever had a blood transfusion? No   Are you willing to have a blood transfusion if it is medically needed before, during, or after your surgery? Yes   Have you or any of your relatives ever had problems with anesthesia? No   Do you have sleep apnea, excessive snoring or daytime drowsiness? (!) YES -follow-up with PCP as this may precipitate cardiovascular disease   Do you have a CPAP machine? (!) NO    Do you have any artifical heart valves or other implanted medical devices like a pacemaker, defibrillator, or continuous glucose monitor? No   Do you have artificial joints? No   Are you allergic to latex? No        Health Care Directive  Patient does not have a Health Care Directive or Living Will:     We discussed CODE STATUS and he is full code    Preoperative Review of    reviewed - patient received small amount of tramadol 8 months ago when he had rib fracture.          Patient Active Problem List    Diagnosis Date Noted    Right-sided chest wall pain 01/16/2024     Priority: Medium    Closed fracture of one rib of right side, initial encounter 01/16/2024     Priority: Medium    Pneumonia of right lower lobe due to infectious organism 01/16/2024     Priority: Medium    Abdominal aortic aneurysm (AAA) without rupture, unspecified part (H24) 01/16/2024     Priority: Medium     5.1cm      FH: colon cancer 09/13/2021     Priority: Medium    Reactive airway disease with acute exacerbation, unspecified asthma severity, unspecified whether persistent 07/30/2019     Priority: Medium    History of  tobacco use 07/30/2019     Priority: Medium    Psychosexual dysfunction with inhibited sexual excitement 02/01/2018     Priority: Medium    Diverticulosis of sigmoid colon 01/25/2016     Priority: Medium    H/O adenomatous polyp of colon 01/25/2016     Priority: Medium    Hypercholesterolemia 01/12/2016     Priority: Medium    Hives 01/11/2016     Priority: Medium      History reviewed. No pertinent past medical history.  Past Surgical History:   Procedure Laterality Date    COLONOSCOPY  01/25/2016    Serrated adenoma  ,F/U 2021    COLONOSCOPY N/A 09/13/2021    F/U 2026 FH colon cancer. Hyperplastic and Sigmoid diverticulosis.    ENDOSCOPIC SINUS SURGERY      No Comments Provided    EXTRACTION(S) DENTAL      No Comments Provided     Current Outpatient Medications   Medication Sig Dispense Refill    acetaminophen (TYLENOL) 325 MG tablet Take 650 mg by mouth 2 times daily      albuterol (PROAIR HFA/PROVENTIL HFA/VENTOLIN HFA) 108 (90 Base) MCG/ACT inhaler Inhale 2 puffs into the lungs every 6 hours as needed for shortness of breath, wheezing or cough 18 g 4    Ascorbic Acid (VITAMIN C PO) Take 500 mg by mouth daily      cholecalciferol (VITAMIN D3) 25 mcg (1000 units) capsule Take 1 capsule by mouth daily      diphenhydrAMINE (BENADRYL) 50 MG capsule Take 50 mg by mouth every 6 hours as needed for itching or allergies      EPINEPHrine (ANY BX GENERIC EQUIV) 0.3 MG/0.3ML injection 2-pack INJECT 0.3 MLS (0.3MG) INTO THE MUSCLE AS NEEDED 0.3 mL 1    fluticasone-salmeterol (ADVAIR) 250-50 MCG/ACT inhaler Inhale 1 puff into the lungs every 12 hours 90 each 4    ipratropium - albuterol 0.5 mg/2.5 mg/3 mL (DUONEB) 0.5-2.5 (3) MG/3ML neb solution Take 1 vial (3 mLs) by nebulization every 6 hours as needed for shortness of breath or wheezing 90 mL 4    multiple vitamin  tablet TABS Take 1 tablet by mouth daily      order for DME Equipment being ordered: Nebulizer 1 Product 1    predniSONE (DELTASONE) 10 MG tablet Take 1-2  "tablets (10-20 mg) by mouth as needed (Shortness of Breath) 30 tablet 2       Allergies   Allergen Reactions    Seasonal         Social History     Tobacco Use    Smoking status: Former     Current packs/day: 0.00     Average packs/day: 1 pack/day for 35.0 years (35.0 ttl pk-yrs)     Types: Cigarettes     Start date: 1983     Quit date: 2018     Years since quittin.9    Smokeless tobacco: Never   Substance Use Topics    Alcohol use: No     Alcohol/week: 0.0 standard drinks of alcohol       History   Drug Use No     Comment: Drug use: No             Review of Systems  Constitutional, HEENT, cardiovascular, pulmonary, gi and gu systems are negative, except as otherwise noted.    Objective    /76 (BP Location: Right arm, Patient Position: Sitting, Cuff Size: Adult Regular)   Pulse 84   Temp 97.9  F (36.6  C) (Tympanic)   Resp 16   Ht 1.797 m (5' 10.75\")   Wt 94.2 kg (207 lb 9.6 oz)   SpO2 93%   BMI 29.16 kg/m     Estimated body mass index is 29.16 kg/m  as calculated from the following:    Height as of this encounter: 1.797 m (5' 10.75\").    Weight as of this encounter: 94.2 kg (207 lb 9.6 oz).  Physical Exam  GENERAL: alert and no distress  EYES: Eyes grossly normal to inspection, PERRL and conjunctivae and sclerae normal  HENT: ear canals and TM's normal, nose and mouth without ulcers or lesions  NECK: no adenopathy, no asymmetry, masses, or scars  RESP: lungs clear to auscultation - no rales, rhonchi or wheezes  CV: regular rate and rhythm, normal S1 S2, no S3 or S4, no murmur, click or rub, no peripheral edema  ABDOMEN: soft, nontender, no hepatosplenomegaly, no masses and bowel sounds normal  MS: no gross musculoskeletal defects noted, no edema  SKIN: no suspicious lesions or rashes  NEURO: Normal strength and tone, mentation intact and speech normal  PSYCH: mentation appears normal, affect normal/bright    Recent Labs   Lab Test 24  1205 24  0701 24  0652   HGB 13.9 " 13.6  --     226  --    INR  --   --  1.08    140  --    POTASSIUM 4.3 4.4  --    CR 0.83 0.77  --         Diagnostics  Recent Results (from the past 24 hour(s))   Basic Metabolic Panel    Collection Time: 09/25/24 11:46 AM   Result Value Ref Range    Sodium 142 135 - 145 mmol/L    Potassium 4.6 3.4 - 5.3 mmol/L    Chloride 105 98 - 107 mmol/L    Carbon Dioxide (CO2) 28 22 - 29 mmol/L    Anion Gap 9 7 - 15 mmol/L    Urea Nitrogen 11.9 8.0 - 23.0 mg/dL    Creatinine 0.86 0.67 - 1.17 mg/dL    GFR Estimate >90 >60 mL/min/1.73m2    Calcium 9.2 8.8 - 10.4 mg/dL    Glucose 124 (H) 70 - 99 mg/dL      No EKG required for low risk surgery (cataract, skin procedure, breast biopsy, etc).    Revised Cardiac Risk Index (RCRI)  The patient has the following serious cardiovascular risks for perioperative complications:   - No serious cardiac risks = 0 points     RCRI Interpretation: 0 points: Class I (very low risk - 0.4% complication rate)         Signed Electronically by: Eliezer Hernandez MD  A copy of this evaluation report is provided to the requesting physician.

## 2024-10-16 ENCOUNTER — APPOINTMENT (OUTPATIENT)
Dept: CT IMAGING | Facility: OTHER | Age: 64
End: 2024-10-16
Attending: PHYSICIAN ASSISTANT
Payer: MEDICARE

## 2024-10-16 ENCOUNTER — APPOINTMENT (OUTPATIENT)
Dept: GENERAL RADIOLOGY | Facility: OTHER | Age: 64
End: 2024-10-16
Attending: PHYSICIAN ASSISTANT
Payer: MEDICARE

## 2024-10-16 ENCOUNTER — HOSPITAL ENCOUNTER (EMERGENCY)
Facility: OTHER | Age: 64
Discharge: HOME | End: 2024-10-16
Attending: PHYSICIAN ASSISTANT | Admitting: PHYSICIAN ASSISTANT
Payer: MEDICARE

## 2024-10-16 VITALS
DIASTOLIC BLOOD PRESSURE: 78 MMHG | HEART RATE: 20 BPM | HEIGHT: 71 IN | WEIGHT: 208 LBS | OXYGEN SATURATION: 94 % | RESPIRATION RATE: 17 BRPM | SYSTOLIC BLOOD PRESSURE: 120 MMHG | BODY MASS INDEX: 29.12 KG/M2 | TEMPERATURE: 97.8 F

## 2024-10-16 DIAGNOSIS — J44.1 COPD EXACERBATION (H): ICD-10-CM

## 2024-10-16 DIAGNOSIS — T17.998A MUCUS PLUG IN RESPIRATORY TRACT: ICD-10-CM

## 2024-10-16 LAB
ALBUMIN SERPL BCG-MCNC: 4.5 G/DL (ref 3.5–5.2)
ALLEN'S TEST: YES
ALP SERPL-CCNC: 102 U/L (ref 40–150)
ALT SERPL W P-5'-P-CCNC: 55 U/L (ref 0–70)
ANION GAP SERPL CALCULATED.3IONS-SCNC: 8 MMOL/L (ref 7–15)
AST SERPL W P-5'-P-CCNC: 55 U/L (ref 0–45)
BASE EXCESS BLDA CALC-SCNC: 0.5 MMOL/L (ref -3–3)
BASOPHILS # BLD AUTO: 0.1 10E3/UL (ref 0–0.2)
BASOPHILS NFR BLD AUTO: 1 %
BILIRUB SERPL-MCNC: 0.4 MG/DL
BUN SERPL-MCNC: 13.5 MG/DL (ref 8–23)
CALCIUM SERPL-MCNC: 9.3 MG/DL (ref 8.8–10.4)
CHLORIDE SERPL-SCNC: 103 MMOL/L (ref 98–107)
COHGB MFR BLD: 99.3 % (ref 96–97)
CREAT SERPL-MCNC: 0.91 MG/DL (ref 0.67–1.17)
D DIMER PPP FEU-MCNC: 2.79 UG/ML FEU (ref 0–0.5)
EGFRCR SERPLBLD CKD-EPI 2021: >90 ML/MIN/1.73M2
EOSINOPHIL # BLD AUTO: 0.6 10E3/UL (ref 0–0.7)
EOSINOPHIL NFR BLD AUTO: 7 %
ERYTHROCYTE [DISTWIDTH] IN BLOOD BY AUTOMATED COUNT: 13.1 % (ref 10–15)
FLUAV RNA SPEC QL NAA+PROBE: NEGATIVE
FLUBV RNA RESP QL NAA+PROBE: NEGATIVE
GLUCOSE SERPL-MCNC: 99 MG/DL (ref 70–99)
HCO3 BLD-SCNC: 28 MMOL/L (ref 21–28)
HCO3 SERPL-SCNC: 31 MMOL/L (ref 22–29)
HCT VFR BLD AUTO: 45.2 % (ref 40–53)
HGB BLD-MCNC: 14.6 G/DL (ref 13.3–17.7)
HOLD SPECIMEN: NORMAL
IMM GRANULOCYTES # BLD: 0 10E3/UL
IMM GRANULOCYTES NFR BLD: 0 %
LACTATE SERPL-SCNC: 0.6 MMOL/L (ref 0.7–2)
LYMPHOCYTES # BLD AUTO: 2 10E3/UL (ref 0.8–5.3)
LYMPHOCYTES NFR BLD AUTO: 26 %
MAGNESIUM SERPL-MCNC: 1.9 MG/DL (ref 1.7–2.3)
MCH RBC QN AUTO: 32.2 PG (ref 26.5–33)
MCHC RBC AUTO-ENTMCNC: 32.3 G/DL (ref 31.5–36.5)
MCV RBC AUTO: 100 FL (ref 78–100)
MONOCYTES # BLD AUTO: 0.7 10E3/UL (ref 0–1.3)
MONOCYTES NFR BLD AUTO: 9 %
NEUTROPHILS # BLD AUTO: 4.3 10E3/UL (ref 1.6–8.3)
NEUTROPHILS NFR BLD AUTO: 56 %
NRBC # BLD AUTO: 0 10E3/UL
NRBC BLD AUTO-RTO: 0 /100
NT-PROBNP SERPL-MCNC: 155 PG/ML (ref 0–900)
O2/TOTAL GAS SETTING VFR VENT: 35 %
PCO2 BLD: 53 MM HG (ref 35–45)
PEEP: 6 CM H2O
PH BLD: 7.33 [PH] (ref 7.35–7.45)
PLATELET # BLD AUTO: 244 10E3/UL (ref 150–450)
PO2 BLD: 122 MM HG (ref 80–105)
POTASSIUM SERPL-SCNC: 4.3 MMOL/L (ref 3.4–5.3)
PROT SERPL-MCNC: 7.4 G/DL (ref 6.4–8.3)
RBC # BLD AUTO: 4.54 10E6/UL (ref 4.4–5.9)
RSV RNA SPEC NAA+PROBE: NEGATIVE
SAO2 % BLDA: 97 % (ref 92–100)
SARS-COV-2 RNA RESP QL NAA+PROBE: NEGATIVE
SODIUM SERPL-SCNC: 142 MMOL/L (ref 135–145)
TROPONIN T SERPL HS-MCNC: 13 NG/L
TROPONIN T SERPL HS-MCNC: 14 NG/L
TSH SERPL DL<=0.005 MIU/L-ACNC: 1 UIU/ML (ref 0.3–4.2)
WBC # BLD AUTO: 7.6 10E3/UL (ref 4–11)

## 2024-10-16 PROCEDURE — 250N000009 HC RX 250: Performed by: PHYSICIAN ASSISTANT

## 2024-10-16 PROCEDURE — 84484 ASSAY OF TROPONIN QUANT: CPT | Performed by: PHYSICIAN ASSISTANT

## 2024-10-16 PROCEDURE — 87637 SARSCOV2&INF A&B&RSV AMP PRB: CPT | Performed by: PHYSICIAN ASSISTANT

## 2024-10-16 PROCEDURE — 83735 ASSAY OF MAGNESIUM: CPT | Performed by: PHYSICIAN ASSISTANT

## 2024-10-16 PROCEDURE — 93005 ELECTROCARDIOGRAM TRACING: CPT | Performed by: PHYSICIAN ASSISTANT

## 2024-10-16 PROCEDURE — 250N000009 HC RX 250: Performed by: EMERGENCY MEDICINE

## 2024-10-16 PROCEDURE — 36415 COLL VENOUS BLD VENIPUNCTURE: CPT | Performed by: PHYSICIAN ASSISTANT

## 2024-10-16 PROCEDURE — 99284 EMERGENCY DEPT VISIT MOD MDM: CPT | Performed by: PHYSICIAN ASSISTANT

## 2024-10-16 PROCEDURE — 82805 BLOOD GASES W/O2 SATURATION: CPT | Performed by: PHYSICIAN ASSISTANT

## 2024-10-16 PROCEDURE — 94640 AIRWAY INHALATION TREATMENT: CPT | Mod: 76

## 2024-10-16 PROCEDURE — 36600 WITHDRAWAL OF ARTERIAL BLOOD: CPT | Performed by: PHYSICIAN ASSISTANT

## 2024-10-16 PROCEDURE — 96374 THER/PROPH/DIAG INJ IV PUSH: CPT | Performed by: PHYSICIAN ASSISTANT

## 2024-10-16 PROCEDURE — 85379 FIBRIN DEGRADATION QUANT: CPT | Performed by: PHYSICIAN ASSISTANT

## 2024-10-16 PROCEDURE — 250N000011 HC RX IP 250 OP 636: Performed by: PHYSICIAN ASSISTANT

## 2024-10-16 PROCEDURE — 71045 X-RAY EXAM CHEST 1 VIEW: CPT

## 2024-10-16 PROCEDURE — 80053 COMPREHEN METABOLIC PANEL: CPT | Performed by: PHYSICIAN ASSISTANT

## 2024-10-16 PROCEDURE — 250N000013 HC RX MED GY IP 250 OP 250 PS 637: Performed by: PHYSICIAN ASSISTANT

## 2024-10-16 PROCEDURE — 99285 EMERGENCY DEPT VISIT HI MDM: CPT | Mod: 25 | Performed by: PHYSICIAN ASSISTANT

## 2024-10-16 PROCEDURE — 83880 ASSAY OF NATRIURETIC PEPTIDE: CPT | Performed by: PHYSICIAN ASSISTANT

## 2024-10-16 PROCEDURE — G1010 CDSM STANSON: HCPCS

## 2024-10-16 PROCEDURE — 83605 ASSAY OF LACTIC ACID: CPT | Performed by: PHYSICIAN ASSISTANT

## 2024-10-16 PROCEDURE — 999N000157 HC STATISTIC RCP TIME EA 10 MIN

## 2024-10-16 PROCEDURE — 96375 TX/PRO/DX INJ NEW DRUG ADDON: CPT | Mod: XU | Performed by: PHYSICIAN ASSISTANT

## 2024-10-16 PROCEDURE — 85025 COMPLETE CBC W/AUTO DIFF WBC: CPT | Performed by: PHYSICIAN ASSISTANT

## 2024-10-16 PROCEDURE — 93010 ELECTROCARDIOGRAM REPORT: CPT | Performed by: INTERNAL MEDICINE

## 2024-10-16 PROCEDURE — 84443 ASSAY THYROID STIM HORMONE: CPT | Performed by: PHYSICIAN ASSISTANT

## 2024-10-16 RX ORDER — ACETYLCYSTEINE 200 MG/ML
SOLUTION ORAL; RESPIRATORY (INHALATION)
Qty: 70 ML | Refills: 0 | Status: SHIPPED | OUTPATIENT
Start: 2024-10-16 | End: 2024-10-18

## 2024-10-16 RX ORDER — GLIPIZIDE 10 MG/1
1 TABLET ORAL
Status: DISCONTINUED | OUTPATIENT
Start: 2024-10-16 | End: 2024-10-17 | Stop reason: HOSPADM

## 2024-10-16 RX ORDER — IPRATROPIUM BROMIDE AND ALBUTEROL SULFATE 2.5; .5 MG/3ML; MG/3ML
3 SOLUTION RESPIRATORY (INHALATION) ONCE
Status: COMPLETED | OUTPATIENT
Start: 2024-10-16 | End: 2024-10-16

## 2024-10-16 RX ORDER — ACETYLCYSTEINE 200 MG/ML
2 SOLUTION ORAL; RESPIRATORY (INHALATION) ONCE
Status: COMPLETED | OUTPATIENT
Start: 2024-10-16 | End: 2024-10-16

## 2024-10-16 RX ORDER — IOPAMIDOL 755 MG/ML
86 INJECTION, SOLUTION INTRAVASCULAR ONCE
Status: COMPLETED | OUTPATIENT
Start: 2024-10-16 | End: 2024-10-16

## 2024-10-16 RX ORDER — GUAIFENESIN 600 MG/1
600 TABLET, EXTENDED RELEASE ORAL 2 TIMES DAILY
Qty: 14 TABLET | Refills: 0 | Status: SHIPPED | OUTPATIENT
Start: 2024-10-16 | End: 2024-10-23

## 2024-10-16 RX ORDER — PREDNISONE 20 MG/1
40 TABLET ORAL DAILY
Qty: 10 TABLET | Refills: 0 | Status: SHIPPED | OUTPATIENT
Start: 2024-10-16

## 2024-10-16 RX ORDER — GUAIFENESIN 600 MG/1
600 TABLET, EXTENDED RELEASE ORAL ONCE
Status: COMPLETED | OUTPATIENT
Start: 2024-10-16 | End: 2024-10-16

## 2024-10-16 RX ORDER — METHYLPREDNISOLONE SODIUM SUCCINATE 125 MG/2ML
125 INJECTION INTRAMUSCULAR; INTRAVENOUS ONCE
Status: COMPLETED | OUTPATIENT
Start: 2024-10-16 | End: 2024-10-16

## 2024-10-16 RX ADMIN — IPRATROPIUM BROMIDE AND ALBUTEROL SULFATE 3 ML: 2.5; .5 SOLUTION RESPIRATORY (INHALATION) at 21:53

## 2024-10-16 RX ADMIN — SODIUM CHLORIDE 80 ML: 9 INJECTION, SOLUTION INTRAVENOUS at 20:23

## 2024-10-16 RX ADMIN — GUAIFENESIN 600 MG: 600 TABLET, EXTENDED RELEASE ORAL at 21:38

## 2024-10-16 RX ADMIN — IPRATROPIUM BROMIDE AND ALBUTEROL SULFATE 3 ML: 2.5; .5 SOLUTION RESPIRATORY (INHALATION) at 18:29

## 2024-10-16 RX ADMIN — FAMOTIDINE 40 MG: 10 INJECTION, SOLUTION INTRAVENOUS at 19:07

## 2024-10-16 RX ADMIN — IOPAMIDOL 86 ML: 755 INJECTION, SOLUTION INTRAVENOUS at 20:23

## 2024-10-16 RX ADMIN — METHYLPREDNISOLONE SODIUM SUCCINATE 125 MG: 125 INJECTION, POWDER, FOR SOLUTION INTRAMUSCULAR; INTRAVENOUS at 19:07

## 2024-10-16 RX ADMIN — ACETYLCYSTEINE 2 ML: 200 SOLUTION ORAL; RESPIRATORY (INHALATION) at 21:58

## 2024-10-16 RX ADMIN — IPRATROPIUM BROMIDE AND ALBUTEROL SULFATE 3 ML: 2.5; .5 SOLUTION RESPIRATORY (INHALATION) at 18:38

## 2024-10-16 ASSESSMENT — ACTIVITIES OF DAILY LIVING (ADL)
ADLS_ACUITY_SCORE: 36

## 2024-10-17 NOTE — ED PROVIDER NOTES
EMERGENCY DEPARTMENT ENCOUNTER      NAME: Uriel Monsalve  AGE: 63 year old male  YOB: 1960  MRN: [id number]  EVALUATION DATE & TIME: 10/16/2024  6:18 PM    PCP: Fernando Macedo    ED PROVIDER: Joe Higgins PA-C       CHIEF COMPLAINT:  Chief Complaint   Patient presents with    Shortness of Breath         HPI  Uriel Monsalve is a pleasant 63 year old male with history of nonoxygen dependent COPD who presents to the ER via ambulance with his family for evaluation of shortness of breath.  Patient recently had right eye cataract surgery earlier today.  Patient was initially short of breath prior to his surgery but after surgery has been having worsening shortness of breath.  Patient has been using nebulizers without any significant improvement.  Patient having oxygen levels in the 80%.  EMS found him to be around 80 to 82%.  Started on CPAP in the ambulance and put on BiPAP once arriving to the ER.      REVIEW OF SYSTEMS   Review of Systems  As above, otherwise ROS is unremarkable.      PAST MEDICAL HISTORY:  No past medical history on file.      PAST SURGICAL HISTORY:  Past Surgical History:   Procedure Laterality Date    COLONOSCOPY  01/25/2016    Serrated adenoma  ,F/U 2021    COLONOSCOPY N/A 09/13/2021    F/U 2026 FH colon cancer. Hyperplastic and Sigmoid diverticulosis.    ENDOSCOPIC SINUS SURGERY      No Comments Provided    EXTRACTION(S) DENTAL      No Comments Provided         CURRENT MEDICATIONS:    Current Outpatient Medications   Medication Instructions    acetaminophen (TYLENOL) 650 mg, Oral, 2 TIMES DAILY    albuterol (PROAIR HFA/PROVENTIL HFA/VENTOLIN HFA) 108 (90 Base) MCG/ACT inhaler 2 puffs, Inhalation, EVERY 6 HOURS PRN    Ascorbic Acid (VITAMIN C PO) 500 mg, Oral, DAILY    cholecalciferol (VITAMIN D3) 25 mcg (1000 units) capsule 1 capsule, Oral, DAILY    diphenhydrAMINE (BENADRYL) 50 mg, Oral, EVERY 6 HOURS PRN    EPINEPHrine (ANY BX GENERIC EQUIV) 0.3 MG/0.3ML injection  2-pack INJECT 0.3 MLS (0.3MG) INTO THE MUSCLE AS NEEDED    fluticasone-salmeterol (ADVAIR) 250-50 MCG/ACT inhaler 1 puff, Inhalation, EVERY 12 HOURS    ipratropium - albuterol 0.5 mg/2.5 mg/3 mL (DUONEB) 0.5-2.5 (3) MG/3ML neb solution 3 mLs, Nebulization, EVERY 6 HOURS PRN    multiple vitamin  tablet TABS 1 tablet, Oral, DAILY    order for DME Equipment being ordered: Nebulizer    predniSONE (DELTASONE) 10-20 mg, Oral, PRN         ALLERGIES:  Allergies   Allergen Reactions    Seasonal          FAMILY HISTORY:  Family History   Problem Relation Age of Onset    Cancer Father         Cancer    Diabetes Other         Diabetes,Family history    Cancer Brother         Cancer,neck    Cancer Brother         Cancer,throat         SOCIAL HISTORY:   Social History     Socioeconomic History    Marital status:    Tobacco Use    Smoking status: Former     Current packs/day: 0.00     Average packs/day: 1 pack/day for 35.0 years (35.0 ttl pk-yrs)     Types: Cigarettes     Start date: 1983     Quit date: 2018     Years since quittin.9    Smokeless tobacco: Never   Vaping Use    Vaping status: Never Used   Substance and Sexual Activity    Alcohol use: No     Alcohol/week: 0.0 standard drinks of alcohol    Drug use: No     Comment: Drug use: No    Sexual activity: Yes     Partners: Female   Social History Narrative    . 3 children and 3 step children  [employer]     Social Determinants of Health     Financial Resource Strain: Low Risk  (2024)    Financial Resource Strain     Within the past 12 months, have you or your family members you live with been unable to get utilities (heat, electricity) when it was really needed?: No   Food Insecurity: Low Risk  (2024)    Food Insecurity     Within the past 12 months, did you worry that your food would run out before you got money to buy more?: No     Within the past 12 months, did the food you bought just not last and you didn t have money to  "get more?: No   Transportation Needs: Low Risk  (1/11/2024)    Transportation Needs     Within the past 12 months, has lack of transportation kept you from medical appointments, getting your medicines, non-medical meetings or appointments, work, or from getting things that you need?: No   Interpersonal Safety: Low Risk  (9/25/2024)    Interpersonal Safety     Do you feel physically and emotionally safe where you currently live?: Yes     Within the past 12 months, have you been hit, slapped, kicked or otherwise physically hurt by someone?: No     Within the past 12 months, have you been humiliated or emotionally abused in other ways by your partner or ex-partner?: No   Housing Stability: Low Risk  (1/11/2024)    Housing Stability     Do you have housing? : Yes     Are you worried about losing your housing?: No       ==================================================================================================================================    PHYSICAL EXAM    VITAL SIGNS: /78   Pulse (!) 20   Temp 97.8  F (36.6  C) (Tympanic)   Resp 17   Ht 1.797 m (5' 10.75\")   Wt 94.3 kg (208 lb)   SpO2 94%   BMI 29.22 kg/m      No data found.      Physical Exam  Vitals and nursing note reviewed.   Constitutional:       General: He is in acute distress.      Appearance: He is well-developed. He is not ill-appearing or diaphoretic.      Comments: Patient speaking in short sentences.   HENT:      Nose: Nose normal.      Mouth/Throat:      Mouth: Mucous membranes are moist.      Pharynx: Oropharynx is clear.   Eyes:      Conjunctiva/sclera: Conjunctivae normal.   Cardiovascular:      Rate and Rhythm: Normal rate and regular rhythm.      Pulses: Normal pulses.      Heart sounds: Normal heart sounds.   Pulmonary:      Effort: Tachypnea and respiratory distress present.      Breath sounds: Decreased breath sounds present.   Abdominal:      General: Abdomen is flat.      Palpations: Abdomen is soft.      Tenderness: " There is no abdominal tenderness.   Musculoskeletal:         General: Normal range of motion.      Cervical back: Normal range of motion.      Right lower leg: No edema.      Left lower leg: No edema.   Skin:     General: Skin is warm and dry.      Capillary Refill: Capillary refill takes less than 2 seconds.   Neurological:      General: No focal deficit present.      Mental Status: He is alert.   Psychiatric:         Mood and Affect: Mood normal.            ==================================================================================================================================    LABS & RADIOLOGY:  All pertinent labs reviewed and interpreted. Reviewed all pertinent imaging. Please see official radiology report.  Results for orders placed or performed during the hospital encounter of 10/16/24   CT Chest Pulmonary Embolism w Contrast    Impression    IMPRESSION:    No pulmonary embolism.    Mucous plugging or debris within several of the small airways in the  lower lungs.    KIRILL CLINE MD         SYSTEM ID:  [id number]   XR Chest 1 View    Impression    IMPRESSION:      No acute cardiopulmonary process.      KIRILL CLINE MD         SYSTEM ID:  [id number]   Symptomatic Influenza A/B, RSV, & SARS-CoV2 PCR (COVID-19) Nose    Specimen: Nose; Swab   Result Value Ref Range    Influenza A PCR Negative Negative    Influenza B PCR Negative Negative    RSV PCR Negative Negative    SARS CoV2 PCR Negative Negative   D dimer quantitative   Result Value Ref Range    D-Dimer Quantitative 2.79 (H) 0.00 - 0.50 ug/mL FEU   Comprehensive metabolic panel   Result Value Ref Range    Sodium 142 135 - 145 mmol/L    Potassium 4.3 3.4 - 5.3 mmol/L    Carbon Dioxide (CO2) 31 (H) 22 - 29 mmol/L    Anion Gap 8 7 - 15 mmol/L    Urea Nitrogen 13.5 8.0 - 23.0 mg/dL    Creatinine 0.91 0.67 - 1.17 mg/dL    GFR Estimate >90 >60 mL/min/1.73m2    Calcium 9.3 8.8 - 10.4 mg/dL    Chloride 103 98 - 107 mmol/L    Glucose 99 70 - 99 mg/dL     Alkaline Phosphatase 102 40 - 150 U/L    AST 55 (H) 0 - 45 U/L    ALT 55 0 - 70 U/L    Protein Total 7.4 6.4 - 8.3 g/dL    Albumin 4.5 3.5 - 5.2 g/dL    Bilirubin Total 0.4 <=1.2 mg/dL   Lactic acid whole blood with 1x repeat in 2 hr when >2   Result Value Ref Range    Lactic Acid, Initial 0.6 (L) 0.7 - 2.0 mmol/L   Result Value Ref Range    Troponin T, High Sensitivity 13 <=22 ng/L   Result Value Ref Range    Magnesium 1.9 1.7 - 2.3 mg/dL   TSH Reflex GH   Result Value Ref Range    TSH 1.00 0.30 - 4.20 uIU/mL   Blood gas arterial   Result Value Ref Range    pH Arterial 7.33 (L) 7.35 - 7.45    pCO2 Arterial 53 (H) 35 - 45 mm Hg    pO2 Arterial 122 (H) 80 - 105 mm Hg    FIO2 35     Bicarbonate Arterial 28 21 - 28 mmol/L    Base Excess/Deficit Arterial 0.5 -3.0 - 3.0 mmol/L    Carlos Manuel's Test Yes     Oxyhemoglobin Arterial 97 92 - 100 %    O2 Sat, Arterial 99.3 (H) 96.0 - 97.0 %    Peep 6 cm H2O   Nt probnp inpatient (BNP)   Result Value Ref Range    N terminal Pro BNP Inpatient 155 0 - 900 pg/mL   Extra Red Top Tube   Result Value Ref Range    Hold Specimen x    CBC with platelets and differential   Result Value Ref Range    WBC Count 7.6 4.0 - 11.0 10e3/uL    RBC Count 4.54 4.40 - 5.90 10e6/uL    Hemoglobin 14.6 13.3 - 17.7 g/dL    Hematocrit 45.2 40.0 - 53.0 %     78 - 100 fL    MCH 32.2 26.5 - 33.0 pg    MCHC 32.3 31.5 - 36.5 g/dL    RDW 13.1 10.0 - 15.0 %    Platelet Count 244 150 - 450 10e3/uL    % Neutrophils 56 %    % Lymphocytes 26 %    % Monocytes 9 %    % Eosinophils 7 %    % Basophils 1 %    % Immature Granulocytes 0 %    NRBCs per 100 WBC 0 <1 /100    Absolute Neutrophils 4.3 1.6 - 8.3 10e3/uL    Absolute Lymphocytes 2.0 0.8 - 5.3 10e3/uL    Absolute Monocytes 0.7 0.0 - 1.3 10e3/uL    Absolute Eosinophils 0.6 0.0 - 0.7 10e3/uL    Absolute Basophils 0.1 0.0 - 0.2 10e3/uL    Absolute Immature Granulocytes 0.0 <=0.4 10e3/uL    Absolute NRBCs 0.0 10e3/uL   Result Value Ref Range    Troponin T, High  Sensitivity 14 <=22 ng/L   EKG 12-lead, tracing only   Result Value Ref Range    Systolic Blood Pressure  mmHg    Diastolic Blood Pressure  mmHg    Ventricular Rate 92 BPM    Atrial Rate 92 BPM    MO Interval 160 ms    QRS Duration 88 ms     ms    QTc 464 ms    P Axis 81 degrees    R AXIS -35 degrees    T Axis 55 degrees    Interpretation ECG       Sinus rhythm  Left axis deviation  Abnormal ECG  When compared with ECG of 16-Jan-2024 07:03,  Nonspecific T wave abnormality has replaced inverted T waves in Inferior leads  Confirmed by MD KIP, [facility] ([id number]) on 10/18/2024 10:27:31 AM       CT Chest Pulmonary Embolism w Contrast   Final Result   IMPRESSION:      No pulmonary embolism.      Mucous plugging or debris within several of the small airways in the   lower lungs.      KIRILL CLINE MD            SYSTEM ID:  [id number]      XR Chest 1 View   Final Result   IMPRESSION:        No acute cardiopulmonary process.        KIRILL CLINE MD            SYSTEM ID:  [id number]            EKG:    EKG reviewed at 1851.  1) Rhythm: NSR  2) Rate: ventricular rate 92 bpm.  3) QRS Axis: Left axis deviation  4) P waves/ MO interval: Sinus. Nml STEVO. No atrial enlargement  5) QRS complex: Narrow. No BBB. No ventricular hypertrophy. No pathological Q waves.  6) ST Segment: No acute ST segment elevation or depression  7) T waves: No T wave inversions. No peaked or flattened T waves.     I have independently reviewed and interpreted today's EKG, pending cardiologist over read.       ==================================================================================================================================    ED COURSE, MEDICAL DECISION MAKING, FINAL IMPRESSION AND PLAN:     Assessment / Plan:  1. COPD exacerbation (H)    2. Mucus plug in respiratory tract        The patient was interviewed and examined.  HPI and physical exam as below.  Differential diagnosis and MDM Key Documentation Elements as below.  Vitals,  "triage note, and nursing notes were reviewed.  /78   Pulse (!) 20   Temp 97.8  F (36.6  C) (Tympanic)   Resp 17   Ht 1.797 m (5' 10.75\")   Wt 94.3 kg (208 lb)   SpO2 94%   BMI 29.22 kg/m      Differential includes but is not limited to COPD exacerbation, pneumonia, ACS/MI    Patient was tachypneic and on BiPAP initially.  Patient with decreased breath sounds on physical exam.  EKG with no ST segment deviation suggest ACS/MI.  Screening troponin and BNP were nonelevated.  Elevated D-dimer 2.79.  Arterial pH slightly low at 7.33 with CO2 55 and O2 of 122.  Laboratory studies are otherwise reassuring.  CT chest PE study on the remarkable for acute PE but did show concerns for mucous plugging.    Patient was given nebulizers here in the ER as well as IV Solu-Medrol, patient's breathing did improve.  Patient was brought off BiPAP and then was able to go back on room air with patient not being hypoxic.  For his mucous plugging, patient was given Mucomyst nebulized and oral Mucinex.    Given significant improvement in symptoms as well as patient wishing to go home, patient will be discharged home.  He will continue his nebulizers at home.  He will be prescribed Mucomyst nebulizer solution as well as oral prednisone and oral Mucinex.  He will follow-up with his primary care doctor.  He will return to the ER if any worsening of symptoms.    Pertinent Labs & Imaging studies reviewed. (See chart for details)  Results for orders placed or performed during the hospital encounter of 10/16/24   CT Chest Pulmonary Embolism w Contrast    Impression    IMPRESSION:    No pulmonary embolism.    Mucous plugging or debris within several of the small airways in the  lower lungs.    KIRILL CLINE MD         SYSTEM ID:  [id number]   XR Chest 1 View    Impression    IMPRESSION:      No acute cardiopulmonary process.      KIRILL CLINE MD         SYSTEM ID:  [id number]   Symptomatic Influenza A/B, RSV, & SARS-CoV2 PCR " (COVID-19) Nose    Specimen: Nose; Swab   Result Value Ref Range    Influenza A PCR Negative Negative    Influenza B PCR Negative Negative    RSV PCR Negative Negative    SARS CoV2 PCR Negative Negative   D dimer quantitative   Result Value Ref Range    D-Dimer Quantitative 2.79 (H) 0.00 - 0.50 ug/mL FEU   Comprehensive metabolic panel   Result Value Ref Range    Sodium 142 135 - 145 mmol/L    Potassium 4.3 3.4 - 5.3 mmol/L    Carbon Dioxide (CO2) 31 (H) 22 - 29 mmol/L    Anion Gap 8 7 - 15 mmol/L    Urea Nitrogen 13.5 8.0 - 23.0 mg/dL    Creatinine 0.91 0.67 - 1.17 mg/dL    GFR Estimate >90 >60 mL/min/1.73m2    Calcium 9.3 8.8 - 10.4 mg/dL    Chloride 103 98 - 107 mmol/L    Glucose 99 70 - 99 mg/dL    Alkaline Phosphatase 102 40 - 150 U/L    AST 55 (H) 0 - 45 U/L    ALT 55 0 - 70 U/L    Protein Total 7.4 6.4 - 8.3 g/dL    Albumin 4.5 3.5 - 5.2 g/dL    Bilirubin Total 0.4 <=1.2 mg/dL   Lactic acid whole blood with 1x repeat in 2 hr when >2   Result Value Ref Range    Lactic Acid, Initial 0.6 (L) 0.7 - 2.0 mmol/L   Result Value Ref Range    Troponin T, High Sensitivity 13 <=22 ng/L   Result Value Ref Range    Magnesium 1.9 1.7 - 2.3 mg/dL   TSH Reflex GH   Result Value Ref Range    TSH 1.00 0.30 - 4.20 uIU/mL   Blood gas arterial   Result Value Ref Range    pH Arterial 7.33 (L) 7.35 - 7.45    pCO2 Arterial 53 (H) 35 - 45 mm Hg    pO2 Arterial 122 (H) 80 - 105 mm Hg    FIO2 35     Bicarbonate Arterial 28 21 - 28 mmol/L    Base Excess/Deficit Arterial 0.5 -3.0 - 3.0 mmol/L    Carlos Manuel's Test Yes     Oxyhemoglobin Arterial 97 92 - 100 %    O2 Sat, Arterial 99.3 (H) 96.0 - 97.0 %    Peep 6 cm H2O   Nt probnp inpatient (BNP)   Result Value Ref Range    N terminal Pro BNP Inpatient 155 0 - 900 pg/mL   Extra Red Top Tube   Result Value Ref Range    Hold Specimen x    CBC with platelets and differential   Result Value Ref Range    WBC Count 7.6 4.0 - 11.0 10e3/uL    RBC Count 4.54 4.40 - 5.90 10e6/uL    Hemoglobin 14.6 13.3 -  "17.7 g/dL    Hematocrit 45.2 40.0 - 53.0 %     78 - 100 fL    MCH 32.2 26.5 - 33.0 pg    MCHC 32.3 31.5 - 36.5 g/dL    RDW 13.1 10.0 - 15.0 %    Platelet Count 244 150 - 450 10e3/uL    % Neutrophils 56 %    % Lymphocytes 26 %    % Monocytes 9 %    % Eosinophils 7 %    % Basophils 1 %    % Immature Granulocytes 0 %    NRBCs per 100 WBC 0 <1 /100    Absolute Neutrophils 4.3 1.6 - 8.3 10e3/uL    Absolute Lymphocytes 2.0 0.8 - 5.3 10e3/uL    Absolute Monocytes 0.7 0.0 - 1.3 10e3/uL    Absolute Eosinophils 0.6 0.0 - 0.7 10e3/uL    Absolute Basophils 0.1 0.0 - 0.2 10e3/uL    Absolute Immature Granulocytes 0.0 <=0.4 10e3/uL    Absolute NRBCs 0.0 10e3/uL   Result Value Ref Range    Troponin T, High Sensitivity 14 <=22 ng/L   EKG 12-lead, tracing only   Result Value Ref Range    Systolic Blood Pressure  mmHg    Diastolic Blood Pressure  mmHg    Ventricular Rate 92 BPM    Atrial Rate 92 BPM    MS Interval 160 ms    QRS Duration 88 ms     ms    QTc 464 ms    P Axis 81 degrees    R AXIS -35 degrees    T Axis 55 degrees    Interpretation ECG       Sinus rhythm  Left axis deviation  Abnormal ECG  When compared with ECG of 16-Jan-2024 07:03,  Nonspecific T wave abnormality has replaced inverted T waves in Inferior leads  Confirmed by MD KIP, ALONZO ([id number]) on 10/18/2024 10:27:31 AM       No results found for: \"ABORH\"      Reassessments, Medications, Interventions, & Response to Treatments:  -as above    Medications given during today's ER visit:  Medications   ipratropium - albuterol 0.5 mg/2.5 mg/3 mL (DUONEB) neb solution 3 mL (3 mLs Nebulization $Given 10/16/24 1829)   ipratropium - albuterol 0.5 mg/2.5 mg/3 mL (DUONEB) neb solution 3 mL (3 mLs Nebulization $Given 10/16/24 1838)   methylPREDNISolone Na Suc (solu-MEDROL) injection 125 mg (125 mg Intravenous $Given 10/16/24 1907)   famotidine (PEPCID) injection 40 mg (40 mg Intravenous $Given 10/16/24 1907)   iopamidol (ISOVUE-370) solution 86 mL (86 mLs " Intravenous $Given 10/16/24 2023)   sodium chloride 0.9 % bag 500 mL for CT scan flush use (80 mLs Intravenous $Given 10/16/24 2023)   guaiFENesin (MUCINEX) 12 hr tablet 600 mg (600 mg Oral $Given 10/16/24 2138)   acetylcysteine (MUCOMYST) 20 % nebulizer solution 2 mL (2 mLs Nebulization $Given 10/16/24 2158)   ipratropium - albuterol 0.5 mg/2.5 mg/3 mL (DUONEB) neb solution 3 mL (3 mLs Nebulization $Given 10/16/24 2153)       Consultations:  None    Decision Rules, Medical Calculators, and Risk Stratification Tools:  None    MDM Key Documentation Elements for Patient's Evaluation:  Differential diagnosis to include high risk considerations: As above  Escalation to admission/observation considered: Admission/observation considered, but patient does not meet admission criteria  Discussions and management with other clinicians:    3a. Independent interpretation of testing performed by another health professional:  -No  3b. Discussion of management or test interpretation with another health professional: -No  Independent interpretation of tests:  Ordering and/or review of 3+ test(s)  Discussion of test interpretations with radiology:  No  History obtained from source other than patient or assessment requiring an independent historian:  No  Review of non-ED/external records:  review of 3+ records  Diagnostic tests considered but not ultimately performed/deferred:  -Legionella  Prescription medications considered but not prescribed:  -Antibiotics  Chronic conditions affecting care:  -COPD  Care affected by social determinants of health:  -None    The patient's management involved:   - Laboratory studies  - Imaging studies  - Prescription drug management  - Parenteral controlled substance      A shared decision making model was used. Time was taken to answer all questions.  Patient and/or associated parties understood and were agreeable to treatment plan.  Plan and all results were discussed. Warning signs and close  return precautions to return to the ED given. Copy of results given. Discharged in stable condition. Discharged with discharge instructions outlining plan for further care and follow up.      New prescriptions started at today's ER visit  Discharge Medication List as of 10/16/2024 10:53 PM        START taking these medications    Details   acetylcysteine (MUCOMYST) 20 % neb solution 10 ml OF SOLUTION VIA NEBULIZATION every evening before bedtime, Disp-70 mL, R-0, E-Prescribe      guaiFENesin (MUCINEX) 600 MG 12 hr tablet Take 1 tablet (600 mg) by mouth 2 times daily for 7 days., Disp-14 tablet, R-0, E-Prescribe      !! predniSONE (DELTASONE) 20 MG tablet Take 2 tablets (40 mg) by mouth daily., Disp-10 tablet, R-0, E-Prescribe       !! - Potential duplicate medications found. Please discuss with provider.          ==================================================================================================================================      Marcin GREGORY PA-C, personally performed the services described in this documentation, and it is both accurate and complete.       Joe Higgins PA-C  10/26/24 [id number]

## 2024-10-17 NOTE — DISCHARGE INSTRUCTIONS
-Take Mucomyst nebulizer treatments every evening for 7 days.  Take next dose tomorrow.  This will help with your mucous plugging in your lungs.  -Do guaifenesin 600 mg tablets every 12 hours for 7 days. This will help with your mucous plugging in your lungs.  -Take prednisone 40 mg every morning for 5 days.  This will help decrease inflammation in your lungs to help with breathing.  -Take all your other medications as normal.  Continue with your other nebulizers as normal.  -Follow-up with your primary care doctor.  Return to the ER if any worsening of symptoms.

## 2024-10-17 NOTE — PROGRESS NOTES
RT accompanied patient to CT on a 4L Oxymask.  BIPAP brought with in case of respiratory distress.  Patient remained on Oxymask throughout CT and was placed back on BIPAP 12/6, 30% when brought back to ED.  Patient tolerated trial off BIPAP well.  Respiratory therapy following.    Geri Manrique, RT

## 2024-10-17 NOTE — ED NOTES
Pt ambulated in hallway )2 went to 89% when arrived in room he saturated up to 92% denies increased SOB dizziness

## 2024-10-17 NOTE — PROGRESS NOTES
DuoNeb and Mucomyst given as ordered.  Pre/Post breath sounds diminished.  Sp02 95% on room air at rest, 90% with ambulation.  Respiratory Therapy following.    Geri Manrique, RT

## 2024-10-18 LAB
ATRIAL RATE - MUSE: 92 BPM
DIASTOLIC BLOOD PRESSURE - MUSE: NORMAL MMHG
INTERPRETATION ECG - MUSE: NORMAL
P AXIS - MUSE: 81 DEGREES
PR INTERVAL - MUSE: 160 MS
QRS DURATION - MUSE: 88 MS
QT - MUSE: 376 MS
QTC - MUSE: 464 MS
R AXIS - MUSE: -35 DEGREES
SYSTOLIC BLOOD PRESSURE - MUSE: NORMAL MMHG
T AXIS - MUSE: 55 DEGREES
VENTRICULAR RATE- MUSE: 92 BPM

## 2024-11-01 RX ORDER — PREDNISONE 20 MG/1
40 TABLET ORAL DAILY
Qty: 10 TABLET | Refills: 0 | OUTPATIENT
Start: 2024-11-01

## 2024-11-01 NOTE — TELEPHONE ENCOUNTER
If still feeling ill and in need of refill, he should be seen in person.     Christina Alan PA-C  11/1/2024  11:56 AM

## 2024-11-01 NOTE — TELEPHONE ENCOUNTER
[id number] of [place] sent Rx request for the following:      Requested Prescriptions   Pending Prescriptions Disp Refills    predniSONE (DELTASONE) 20 MG tablet 10 tablet 0     Sig: Take 2 tablets (40 mg) by mouth daily.       There is no refill protocol information for this order        Last Prescription Date:   10/16/24  Last Fill Qty/Refills:         10, R-0 (ED)    Last Office Visit:              9/25/24 (preop)  Future Office visit:           11/25/24    Unable to complete prescription refill per RN Medication Refill Policy. Routing to covering provider for refill consideration, as PCP/provider is out of clinic >48 hours or Pt is completely out of medication and provider is out of the clinic today. Geri Nixon RN .............. 11/1/2024  11:35 AM

## 2024-11-01 NOTE — TELEPHONE ENCOUNTER
Called and spoke to Patient after verifying last name and date of birth. Ok to disregard per Pt. He states [facility] must have sent the request for the wrong one. He wanted a prescription for the 10 mg, but was able to get it filled. Geri Nixon RN .............. 11/1/2024  1:06 PM

## 2024-11-25 ENCOUNTER — OFFICE VISIT (OUTPATIENT)
Dept: FAMILY MEDICINE | Facility: OTHER | Age: 64
End: 2024-11-25
Attending: FAMILY MEDICINE
Payer: MEDICARE

## 2024-11-25 VITALS
BODY MASS INDEX: 30.6 KG/M2 | WEIGHT: 218.6 LBS | HEIGHT: 71 IN | OXYGEN SATURATION: 94 % | TEMPERATURE: 97.4 F | HEART RATE: 73 BPM | DIASTOLIC BLOOD PRESSURE: 86 MMHG | SYSTOLIC BLOOD PRESSURE: 126 MMHG | RESPIRATION RATE: 16 BRPM

## 2024-11-25 DIAGNOSIS — J44.1 COPD EXACERBATION (H): ICD-10-CM

## 2024-11-25 DIAGNOSIS — Z92.241 HISTORY OF RECENT STEROID USE: Primary | ICD-10-CM

## 2024-11-25 DIAGNOSIS — J45.41 MODERATE PERSISTENT REACTIVE AIRWAY DISEASE WITH WHEEZING WITH ACUTE EXACERBATION: ICD-10-CM

## 2024-11-25 DIAGNOSIS — Z79.52 LONG TERM (CURRENT) USE OF SYSTEMIC STEROIDS: ICD-10-CM

## 2024-11-25 DIAGNOSIS — J45.901 REACTIVE AIRWAY DISEASE WITH ACUTE EXACERBATION, UNSPECIFIED ASTHMA SEVERITY, UNSPECIFIED WHETHER PERSISTENT: ICD-10-CM

## 2024-11-25 PROCEDURE — G0463 HOSPITAL OUTPT CLINIC VISIT: HCPCS

## 2024-11-25 RX ORDER — IPRATROPIUM BROMIDE AND ALBUTEROL SULFATE 2.5; .5 MG/3ML; MG/3ML
1 SOLUTION RESPIRATORY (INHALATION) EVERY 6 HOURS PRN
Qty: 90 ML | Refills: 4 | Status: SHIPPED | OUTPATIENT
Start: 2024-11-25

## 2024-11-25 RX ORDER — FLUTICASONE PROPIONATE AND SALMETEROL 250; 50 UG/1; UG/1
1 POWDER RESPIRATORY (INHALATION) EVERY 12 HOURS
Qty: 90 EACH | Refills: 4 | Status: SHIPPED | OUTPATIENT
Start: 2024-11-25

## 2024-11-25 RX ORDER — ALBUTEROL SULFATE 90 UG/1
2 INHALANT RESPIRATORY (INHALATION) EVERY 6 HOURS PRN
Qty: 18 G | Refills: 4 | Status: SHIPPED | OUTPATIENT
Start: 2024-11-25

## 2024-11-25 RX ORDER — PREDNISONE 10 MG/1
10-20 TABLET ORAL PRN
Qty: 30 TABLET | Refills: 5 | Status: SHIPPED | OUTPATIENT
Start: 2024-11-25

## 2024-11-25 ASSESSMENT — PAIN SCALES - GENERAL: PAINLEVEL_OUTOF10: NO PAIN (0)

## 2024-11-25 NOTE — NURSING NOTE
"Chief Complaint   Patient presents with    Follow Up     Med Refill       Initial /86 (BP Location: Right arm, Patient Position: Sitting, Cuff Size: Adult Large)   Pulse 73   Temp 97.4  F (36.3  C) (Temporal)   Resp 16   Ht 1.797 m (5' 10.75\")   Wt 99.2 kg (218 lb 9.6 oz)   SpO2 94%   BMI 30.70 kg/m   Estimated body mass index is 30.7 kg/m  as calculated from the following:    Height as of this encounter: 1.797 m (5' 10.75\").    Weight as of this encounter: 99.2 kg (218 lb 9.6 oz).  Medication Review: complete    The next two questions are to help us understand your food security.  If you are feeling you need any assistance in this area, we have resources available to support you today.          11/25/2024   SDOH- Food Insecurity   Within the past 12 months, did you worry that your food would run out before you got money to buy more? N   Within the past 12 months, did the food you bought just not last and you didn t have money to get more? N              Health Care Directive:  Patient does not have a Health Care Directive: Discussed advance care planning with patient; however, patient declined at this time.    Daria Chaves      "

## 2024-11-25 NOTE — PROGRESS NOTES
"  SUBJECTIVE:   Uriel Monsalve is a 63 year old male who presents to clinic today for the following health issues: Medication refill patient arrives here for medication renewal.    History of Present Illness       Reason for visit:  Med Followup He is missing 1 dose(s) of medications per week.  He is not taking prescribed medications regularly due to remembering to take.  He has been using inhalers on a pretty consistent basis.  Occasionally takes prednisone.  Chart reviewed indicating he has not had a DEXA scan and is in need of a DEXA scan.  Recently underwent cataract surgery which went well.      Patient Active Problem List    Diagnosis Date Noted    History of recent steroid use 11/25/2024     Priority: Medium    Right-sided chest wall pain 01/16/2024     Priority: Medium    Closed fracture of one rib of right side, initial encounter 01/16/2024     Priority: Medium    Pneumonia of right lower lobe due to infectious organism 01/16/2024     Priority: Medium    Abdominal aortic aneurysm (AAA) without rupture, unspecified part (H) 01/16/2024     Priority: Medium     5.1cm      FH: colon cancer 09/13/2021     Priority: Medium    Reactive airway disease with acute exacerbation, unspecified asthma severity, unspecified whether persistent 07/30/2019     Priority: Medium    History of tobacco use 07/30/2019     Priority: Medium    Psychosexual dysfunction with inhibited sexual excitement 02/01/2018     Priority: Medium    Diverticulosis of sigmoid colon 01/25/2016     Priority: Medium    H/O adenomatous polyp of colon 01/25/2016     Priority: Medium    Hypercholesterolemia 01/12/2016     Priority: Medium    Hives 01/11/2016     Priority: Medium     History reviewed. No pertinent past medical history.     Review of Systems     OBJECTIVE:     /86 (BP Location: Right arm, Patient Position: Sitting, Cuff Size: Adult Large)   Pulse 73   Temp 97.4  F (36.3  C) (Temporal)   Resp 16   Ht 1.797 m (5' 10.75\")   Wt 99.2 " kg (218 lb 9.6 oz)   SpO2 94%   BMI 30.70 kg/m    Body mass index is 30.7 kg/m .  Physical Exam  Constitutional:       Appearance: Normal appearance.   HENT:      Head: Normocephalic and atraumatic.   Neurological:      Mental Status: He is alert.   Psychiatric:         Mood and Affect: Mood normal.         Diagnostic Test Results:  none     ASSESSMENT/PLAN:         (Z92.308) History of recent steroid use  (primary encounter diagnosis)  Comment: Refill  Plan: DX Bone Density            (J45.901) Reactive airway disease with acute exacerbation, unspecified asthma severity, unspecified whether persistent  Comment:   Plan: predniSONE (DELTASONE) 10 MG tablet, albuterol         (PROAIR HFA/PROVENTIL HFA/VENTOLIN HFA) 108 (90        Base) MCG/ACT inhaler        Patient is up-to-date on PFTs    (J45.41) Moderate persistent reactive airway disease with wheezing with acute exacerbation  Comment:   Plan: ipratropium - albuterol 0.5 mg/2.5 mg/3 mL         (DUONEB) 0.5-2.5 (3) MG/3ML neb solution            (J44.1) COPD exacerbation (H)  Comment:   Plan: fluticasone-salmeterol (ADVAIR) 250-50 MCG/ACT         inhaler            (Z79.52) Long term (current) use of systemic steroids  Comment:   Plan: DX Bone Density          We discussed the use of oral prednisone.  He is seems to good have a good handle waiting 1 to started.  States that time she only need 1 or 2 doses.  Continue with inhalers.  The longitudinal plan of care for the diagnosis(es)/condition(s) as documented were addressed during this visit. Due to the added complexity in care, I will continue to support Leobardo in the subsequent management and with ongoing continuity of care.    Fernando Macedo MD  [facility]

## 2024-12-30 ENCOUNTER — HOSPITAL ENCOUNTER (OUTPATIENT)
Dept: BONE DENSITY | Facility: OTHER | Age: 64
Discharge: HOME | End: 2024-12-30
Attending: FAMILY MEDICINE | Admitting: FAMILY MEDICINE
Payer: MEDICARE

## 2024-12-30 DIAGNOSIS — Z92.241 HISTORY OF RECENT STEROID USE: ICD-10-CM

## 2024-12-30 DIAGNOSIS — Z79.52 LONG TERM (CURRENT) USE OF SYSTEMIC STEROIDS: ICD-10-CM

## 2024-12-30 PROCEDURE — 77080 DXA BONE DENSITY AXIAL: CPT

## 2025-01-12 ENCOUNTER — HEALTH MAINTENANCE LETTER (OUTPATIENT)
Age: 65
End: 2025-01-12

## (undated) DEVICE — SUCTION MANIFOLD NEPTUNE 2 SYS 4 PORT 0702-020-000

## (undated) DEVICE — TUBING SUCTION 10'X3/16" N510

## (undated) DEVICE — ESU ENDO FORCEP BX HOT FD-210U

## (undated) DEVICE — ENDO KIT COMPLIANCE DYKENDOCMPLY

## (undated) DEVICE — ENDO BRUSH CHANNEL MASTER CLEANING 2-4.2MM BW-412T

## (undated) DEVICE — SOL WATER 1500ML

## (undated) DEVICE — ESU GROUND PAD ADULT W/CORD E7507

## (undated) RX ORDER — METHYLPREDNISOLONE SODIUM SUCCINATE 125 MG/2ML
INJECTION, POWDER, LYOPHILIZED, FOR SOLUTION INTRAMUSCULAR; INTRAVENOUS
Status: DISPENSED
Start: 2024-01-16

## (undated) RX ORDER — METHYLPREDNISOLONE SODIUM SUCCINATE 125 MG/2ML
INJECTION INTRAMUSCULAR; INTRAVENOUS
Status: DISPENSED
Start: 2024-10-16

## (undated) RX ORDER — GUAIFENESIN 600 MG/1
TABLET, EXTENDED RELEASE ORAL
Status: DISPENSED
Start: 2024-10-16

## (undated) RX ORDER — DEXAMETHASONE SODIUM PHOSPHATE 10 MG/ML
INJECTION, SOLUTION INTRAMUSCULAR; INTRAVENOUS
Status: DISPENSED
Start: 2022-11-27

## (undated) RX ORDER — IPRATROPIUM BROMIDE AND ALBUTEROL SULFATE 2.5; .5 MG/3ML; MG/3ML
SOLUTION RESPIRATORY (INHALATION)
Status: DISPENSED
Start: 2019-07-12

## (undated) RX ORDER — IPRATROPIUM BROMIDE AND ALBUTEROL SULFATE 2.5; .5 MG/3ML; MG/3ML
SOLUTION RESPIRATORY (INHALATION)
Status: DISPENSED
Start: 2024-10-16

## (undated) RX ORDER — KETOROLAC TROMETHAMINE 15 MG/ML
INJECTION, SOLUTION INTRAMUSCULAR; INTRAVENOUS
Status: DISPENSED
Start: 2024-01-16

## (undated) RX ORDER — IPRATROPIUM BROMIDE AND ALBUTEROL SULFATE 2.5; .5 MG/3ML; MG/3ML
SOLUTION RESPIRATORY (INHALATION)
Status: DISPENSED
Start: 2022-11-27

## (undated) RX ORDER — HYDROMORPHONE HYDROCHLORIDE 1 MG/ML
INJECTION, SOLUTION INTRAMUSCULAR; INTRAVENOUS; SUBCUTANEOUS
Status: DISPENSED
Start: 2024-01-16

## (undated) RX ORDER — IPRATROPIUM BROMIDE AND ALBUTEROL SULFATE 2.5; .5 MG/3ML; MG/3ML
SOLUTION RESPIRATORY (INHALATION)
Status: DISPENSED
Start: 2024-01-16

## (undated) RX ORDER — CEFTRIAXONE 2 G/1
INJECTION, POWDER, FOR SOLUTION INTRAMUSCULAR; INTRAVENOUS
Status: DISPENSED
Start: 2024-01-16

## (undated) RX ORDER — PROPOFOL 10 MG/ML
INJECTION, EMULSION INTRAVENOUS
Status: DISPENSED
Start: 2021-09-13

## (undated) RX ORDER — AZITHROMYCIN 500 MG/5ML
INJECTION, POWDER, LYOPHILIZED, FOR SOLUTION INTRAVENOUS
Status: DISPENSED
Start: 2024-01-16

## (undated) RX ORDER — LORAZEPAM 2 MG/ML
INJECTION INTRAMUSCULAR
Status: DISPENSED
Start: 2024-01-16

## (undated) RX ORDER — LIDOCAINE HYDROCHLORIDE 20 MG/ML
INJECTION, SOLUTION EPIDURAL; INFILTRATION; INTRACAUDAL; PERINEURAL
Status: DISPENSED
Start: 2021-09-13

## (undated) RX ORDER — ACETYLCYSTEINE 200 MG/ML
SOLUTION ORAL; RESPIRATORY (INHALATION)
Status: DISPENSED
Start: 2024-10-16